# Patient Record
Sex: FEMALE | Race: WHITE | Employment: OTHER | URBAN - METROPOLITAN AREA
[De-identification: names, ages, dates, MRNs, and addresses within clinical notes are randomized per-mention and may not be internally consistent; named-entity substitution may affect disease eponyms.]

---

## 2017-05-19 ENCOUNTER — OFFICE VISIT (OUTPATIENT)
Dept: INTERNAL MEDICINE CLINIC | Age: 72
End: 2017-05-19

## 2017-05-19 VITALS
HEIGHT: 60 IN | OXYGEN SATURATION: 97 % | HEART RATE: 85 BPM | SYSTOLIC BLOOD PRESSURE: 100 MMHG | WEIGHT: 148.4 LBS | DIASTOLIC BLOOD PRESSURE: 60 MMHG | BODY MASS INDEX: 29.13 KG/M2

## 2017-05-19 DIAGNOSIS — M54.16 LEFT LUMBAR RADICULOPATHY: ICD-10-CM

## 2017-05-19 DIAGNOSIS — M81.0 OSTEOPOROSIS: ICD-10-CM

## 2017-05-19 DIAGNOSIS — M25.559 ARTHRALGIA OF HIP, UNSPECIFIED LATERALITY: ICD-10-CM

## 2017-05-19 DIAGNOSIS — F41.9 ANXIETY: ICD-10-CM
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
## 2017-05-19 DIAGNOSIS — E78.2 MIXED HYPERLIPIDEMIA: ICD-10-CM

## 2017-05-19 DIAGNOSIS — Z00.00 ROUTINE GENERAL MEDICAL EXAMINATION AT A HEALTH CARE FACILITY: Primary | ICD-10-CM

## 2017-05-19 DIAGNOSIS — I10 ESSENTIAL HYPERTENSION: ICD-10-CM

## 2017-05-19 DIAGNOSIS — M48.061 SPINAL STENOSIS OF LUMBAR REGION: ICD-10-CM

## 2017-05-19 DIAGNOSIS — Z12.31 VISIT FOR SCREENING MAMMOGRAM: ICD-10-CM

## 2017-05-19 PROCEDURE — G0439 PPPS, SUBSEQ VISIT: HCPCS | Performed by: INTERNAL MEDICINE

## 2017-05-19 RX ORDER — HYDROCODONE BITARTRATE AND ACETAMINOPHEN 5; 325 MG/1; MG/1
1 TABLET ORAL EVERY 8 HOURS PRN
Qty: 30 TABLET | Refills: 0 | Status: SHIPPED | OUTPATIENT
Start: 2017-05-19 | End: 2017-11-07 | Stop reason: SDUPTHER

## 2017-05-19 RX ORDER — ESCITALOPRAM OXALATE 10 MG/1
10 TABLET ORAL DAILY
Qty: 90 TABLET | Refills: 2 | Status: SHIPPED | OUTPATIENT
Start: 2017-05-19 | End: 2017-07-13 | Stop reason: SDUPTHER

## 2017-05-19 RX ORDER — HYDROCHLOROTHIAZIDE 12.5 MG/1
12.5 CAPSULE, GELATIN COATED ORAL DAILY PRN
Qty: 90 CAPSULE | Refills: 2 | Status: SHIPPED | OUTPATIENT
Start: 2017-05-19 | End: 2017-11-07 | Stop reason: SDUPTHER

## 2017-05-19 ASSESSMENT — ANXIETY QUESTIONNAIRES: GAD7 TOTAL SCORE: 0

## 2017-05-19 ASSESSMENT — LIFESTYLE VARIABLES: HOW OFTEN DO YOU HAVE A DRINK CONTAINING ALCOHOL: 0

## 2017-05-19 ASSESSMENT — PATIENT HEALTH QUESTIONNAIRE - PHQ9: SUM OF ALL RESPONSES TO PHQ QUESTIONS 1-9: 0

## 2017-05-30 DIAGNOSIS — I10 ESSENTIAL HYPERTENSION: ICD-10-CM

## 2017-05-30 DIAGNOSIS — E78.2 MIXED HYPERLIPIDEMIA: ICD-10-CM

## 2017-05-30 LAB
A/G RATIO: 1.5 (ref 1.1–2.2)
ALBUMIN SERPL-MCNC: 4 G/DL (ref 3.4–5)
ALP BLD-CCNC: 95 U/L (ref 40–129)
ALT SERPL-CCNC: 11 U/L (ref 10–40)
ANION GAP SERPL CALCULATED.3IONS-SCNC: 13 MMOL/L (ref 3–16)
AST SERPL-CCNC: 15 U/L (ref 15–37)
BASOPHILS ABSOLUTE: 0 K/UL (ref 0–0.2)
BASOPHILS RELATIVE PERCENT: 0.6 %
BILIRUB SERPL-MCNC: 0.4 MG/DL (ref 0–1)
BUN BLDV-MCNC: 28 MG/DL (ref 7–20)
CALCIUM SERPL-MCNC: 8.8 MG/DL (ref 8.3–10.6)
CHLORIDE BLD-SCNC: 106 MMOL/L (ref 99–110)
CHOLESTEROL, TOTAL: 192 MG/DL (ref 0–199)
CO2: 25 MMOL/L (ref 21–32)
CREAT SERPL-MCNC: 1.1 MG/DL (ref 0.6–1.2)
EOSINOPHILS ABSOLUTE: 0.2 K/UL (ref 0–0.6)
EOSINOPHILS RELATIVE PERCENT: 3.1 %
GFR AFRICAN AMERICAN: 59
GFR NON-AFRICAN AMERICAN: 49
GLOBULIN: 2.6 G/DL
GLUCOSE BLD-MCNC: 87 MG/DL (ref 70–99)
HCT VFR BLD CALC: 36.5 % (ref 36–48)
HDLC SERPL-MCNC: 60 MG/DL (ref 40–60)
HEMOGLOBIN: 12.2 G/DL (ref 12–16)
LDL CHOLESTEROL CALCULATED: 108 MG/DL
LYMPHOCYTES ABSOLUTE: 1 K/UL (ref 1–5.1)
LYMPHOCYTES RELATIVE PERCENT: 18.7 %
MCH RBC QN AUTO: 31.1 PG (ref 26–34)
MCHC RBC AUTO-ENTMCNC: 33.4 G/DL (ref 31–36)
MCV RBC AUTO: 93.2 FL (ref 80–100)
MONOCYTES ABSOLUTE: 0.6 K/UL (ref 0–1.3)
MONOCYTES RELATIVE PERCENT: 10.6 %
NEUTROPHILS ABSOLUTE: 3.5 K/UL (ref 1.7–7.7)
NEUTROPHILS RELATIVE PERCENT: 67 %
PDW BLD-RTO: 13.2 % (ref 12.4–15.4)
PLATELET # BLD: 186 K/UL (ref 135–450)
PMV BLD AUTO: 9.6 FL (ref 5–10.5)
POTASSIUM SERPL-SCNC: 4.5 MMOL/L (ref 3.5–5.1)
RBC # BLD: 3.92 M/UL (ref 4–5.2)
SODIUM BLD-SCNC: 144 MMOL/L (ref 136–145)
TOTAL PROTEIN: 6.6 G/DL (ref 6.4–8.2)
TRIGL SERPL-MCNC: 118 MG/DL (ref 0–150)
VLDLC SERPL CALC-MCNC: 24 MG/DL
WBC # BLD: 5.3 K/UL (ref 4–11)

## 2017-05-31 LAB — TSH SERPL DL<=0.05 MIU/L-ACNC: 1.77 UIU/ML (ref 0.27–4.2)

## 2017-06-02 DIAGNOSIS — M81.0 OSTEOPOROSIS: Primary | ICD-10-CM

## 2017-06-02 RX ORDER — ALENDRONATE SODIUM 70 MG/1
70 TABLET ORAL
Qty: 4 TABLET | Refills: 3 | Status: SHIPPED | OUTPATIENT
Start: 2017-06-02 | End: 2017-11-07 | Stop reason: ALTCHOICE

## 2017-07-13 DIAGNOSIS — F41.9 ANXIETY: ICD-10-CM

## 2017-07-13 RX ORDER — ESCITALOPRAM OXALATE 10 MG/1
10 TABLET ORAL DAILY
Qty: 90 TABLET | Refills: 2 | Status: SHIPPED | OUTPATIENT
Start: 2017-07-13 | End: 2017-11-07 | Stop reason: SDUPTHER

## 2017-11-07 ENCOUNTER — OFFICE VISIT (OUTPATIENT)
Dept: INTERNAL MEDICINE CLINIC | Age: 72
End: 2017-11-07

## 2017-11-07 VITALS
DIASTOLIC BLOOD PRESSURE: 66 MMHG | WEIGHT: 146 LBS | RESPIRATION RATE: 16 BRPM | HEART RATE: 88 BPM | BODY MASS INDEX: 28.51 KG/M2 | OXYGEN SATURATION: 98 % | SYSTOLIC BLOOD PRESSURE: 120 MMHG

## 2017-11-07 DIAGNOSIS — M54.16 LEFT LUMBAR RADICULOPATHY: ICD-10-CM

## 2017-11-07 DIAGNOSIS — M54.31 RIGHT SIDED SCIATICA: Primary | ICD-10-CM

## 2017-11-07 DIAGNOSIS — Z23 NEED FOR IMMUNIZATION AGAINST INFLUENZA: ICD-10-CM

## 2017-11-07 DIAGNOSIS — K21.9 GASTROESOPHAGEAL REFLUX DISEASE WITHOUT ESOPHAGITIS: ICD-10-CM

## 2017-11-07 DIAGNOSIS — I10 ESSENTIAL HYPERTENSION: ICD-10-CM

## 2017-11-07 DIAGNOSIS — F41.9 ANXIETY: ICD-10-CM

## 2017-11-07 PROCEDURE — G8417 CALC BMI ABV UP PARAM F/U: HCPCS | Performed by: INTERNAL MEDICINE

## 2017-11-07 PROCEDURE — 4040F PNEUMOC VAC/ADMIN/RCVD: CPT | Performed by: INTERNAL MEDICINE

## 2017-11-07 PROCEDURE — G8484 FLU IMMUNIZE NO ADMIN: HCPCS | Performed by: INTERNAL MEDICINE

## 2017-11-07 PROCEDURE — G0008 ADMIN INFLUENZA VIRUS VAC: HCPCS | Performed by: INTERNAL MEDICINE

## 2017-11-07 PROCEDURE — G8399 PT W/DXA RESULTS DOCUMENT: HCPCS | Performed by: INTERNAL MEDICINE

## 2017-11-07 PROCEDURE — G8427 DOCREV CUR MEDS BY ELIG CLIN: HCPCS | Performed by: INTERNAL MEDICINE

## 2017-11-07 PROCEDURE — 1090F PRES/ABSN URINE INCON ASSESS: CPT | Performed by: INTERNAL MEDICINE

## 2017-11-07 PROCEDURE — 99214 OFFICE O/P EST MOD 30 MIN: CPT | Performed by: INTERNAL MEDICINE

## 2017-11-07 PROCEDURE — 1036F TOBACCO NON-USER: CPT | Performed by: INTERNAL MEDICINE

## 2017-11-07 PROCEDURE — 1123F ACP DISCUSS/DSCN MKR DOCD: CPT | Performed by: INTERNAL MEDICINE

## 2017-11-07 PROCEDURE — 3017F COLORECTAL CA SCREEN DOC REV: CPT | Performed by: INTERNAL MEDICINE

## 2017-11-07 PROCEDURE — 90662 IIV NO PRSV INCREASED AG IM: CPT | Performed by: INTERNAL MEDICINE

## 2017-11-07 PROCEDURE — 3014F SCREEN MAMMO DOC REV: CPT | Performed by: INTERNAL MEDICINE

## 2017-11-07 RX ORDER — ESCITALOPRAM OXALATE 10 MG/1
10 TABLET ORAL DAILY
Qty: 90 TABLET | Refills: 2 | Status: SHIPPED | OUTPATIENT
Start: 2017-11-07 | End: 2018-04-18 | Stop reason: SDUPTHER

## 2017-11-07 RX ORDER — HYDROCODONE BITARTRATE AND ACETAMINOPHEN 5; 325 MG/1; MG/1
1 TABLET ORAL EVERY 8 HOURS PRN
Qty: 30 TABLET | Refills: 0 | Status: SHIPPED | OUTPATIENT
Start: 2017-11-07 | End: 2018-04-18 | Stop reason: ALTCHOICE

## 2017-11-07 RX ORDER — CELECOXIB 200 MG/1
200 CAPSULE ORAL DAILY
Qty: 90 CAPSULE | Refills: 1 | Status: SHIPPED | OUTPATIENT
Start: 2017-11-07 | End: 2018-04-18 | Stop reason: SDUPTHER

## 2017-11-07 RX ORDER — PREDNISONE 1 MG/1
TABLET ORAL
Qty: 36 TABLET | Refills: 0 | Status: SHIPPED | OUTPATIENT
Start: 2017-11-07 | End: 2018-04-18 | Stop reason: ALTCHOICE

## 2017-11-07 RX ORDER — HYDROCHLOROTHIAZIDE 12.5 MG/1
12.5 CAPSULE, GELATIN COATED ORAL DAILY PRN
Qty: 90 CAPSULE | Refills: 2 | Status: SHIPPED | OUTPATIENT
Start: 2017-11-07 | End: 2018-04-18 | Stop reason: SDUPTHER

## 2017-11-07 NOTE — PATIENT INSTRUCTIONS
Up Now\" link. Current as of: March 21, 2017  Content Version: 11.3  © 4229-9754 [employer]. Care instructions adapted under license by [facility] ([facility]). If you have questions about a medical condition or this instruction, always ask your healthcare professional. [address] any warranty or liability for your use of this information.

## 2017-11-07 NOTE — PROGRESS NOTES
and all orders for this visit:    Right sided sciatica- SHE WILL TRY HOME EXERCISES, PRED TAPER. IF NO BETTER IN 1-2 WEEKS WILL CONSIDER RETRY PT  -     predniSONE (DELTASONE) 5 MG tablet; Take 8 pills, then 7,6,5,4,3,2,1    Left lumbar radiculopathy- OTHERWISE STABLE AND CONTROLLED W LIMITED SCRIPT PRN NORCO. RF TODAY, ALSO RETRY CELEBREX PER PT REQUEST  -     HYDROcodone-acetaminophen (NORCO) 5-325 MG per tablet; Take 1 tablet by mouth every 8 hours as needed for Pain . Earliest Fill Date: 11/7/17  -     celecoxib (CELEBREX) 200 MG capsule; Take 1 capsule by mouth daily    Essential hypertension - Blood pressure stable and will continue current regimen. Plan FOR FASTING LAB AT NEXT APPT    -     hydrochlorothiazide (MICROZIDE) 12.5 MG capsule; Take 1 capsule by mouth daily as needed (for swelling in legs or high BP)    Anxiety - Mood stable on current regimen w/o any significant manifestations of severe depression or anxiety noted at this time. Cont current meds. -     escitalopram (LEXAPRO) 10 MG tablet; Take 1 tablet by mouth daily    Need for immunization against influenza  -     INFLUENZA, HIGH DOSE, 65 YRS +, IM, PF, PREFILL SYR, 0.5ML (FLUZONE HD)    Gastroesophageal reflux disease without esophagitis- MILD FLARE BUT ONLY ON 23MG DOSE OTC, ADVISED TO INCR TO 2 TABS/DAY FOR A WEEK THEN MAY GO BACK TO ONE/DAY, BID PRN  -     esomeprazole (NEXIUM) 20 MG delayed release capsule;  Take 2 capsules by mouth every morning (before breakfast)

## 2018-04-18 ENCOUNTER — OFFICE VISIT (OUTPATIENT)
Dept: INTERNAL MEDICINE CLINIC | Age: 73
End: 2018-04-18

## 2018-04-18 VITALS
DIASTOLIC BLOOD PRESSURE: 72 MMHG | OXYGEN SATURATION: 97 % | SYSTOLIC BLOOD PRESSURE: 112 MMHG | BODY MASS INDEX: 29.06 KG/M2 | HEART RATE: 83 BPM | WEIGHT: 148 LBS | RESPIRATION RATE: 16 BRPM | HEIGHT: 60 IN

## 2018-04-18 DIAGNOSIS — M54.16 LEFT LUMBAR RADICULOPATHY: ICD-10-CM

## 2018-04-18 DIAGNOSIS — F41.9 ANXIETY: ICD-10-CM

## 2018-04-18 DIAGNOSIS — E78.2 MIXED HYPERLIPIDEMIA: ICD-10-CM

## 2018-04-18 DIAGNOSIS — Z12.12 SCREENING FOR COLORECTAL CANCER: ICD-10-CM

## 2018-04-18 DIAGNOSIS — I10 ESSENTIAL HYPERTENSION: Primary | ICD-10-CM

## 2018-04-18 DIAGNOSIS — K21.9 GASTROESOPHAGEAL REFLUX DISEASE WITHOUT ESOPHAGITIS: ICD-10-CM

## 2018-04-18 DIAGNOSIS — Z12.31 VISIT FOR SCREENING MAMMOGRAM: ICD-10-CM

## 2018-04-18 DIAGNOSIS — I10 ESSENTIAL HYPERTENSION: ICD-10-CM

## 2018-04-18 DIAGNOSIS — Z23 NEED FOR SHINGLES VACCINE: ICD-10-CM

## 2018-04-18 DIAGNOSIS — Z12.11 SCREENING FOR COLORECTAL CANCER: ICD-10-CM

## 2018-04-18 LAB
A/G RATIO: 1.9 (ref 1.1–2.2)
ALBUMIN SERPL-MCNC: 4.4 G/DL (ref 3.4–5)
ALP BLD-CCNC: 83 U/L (ref 40–129)
ALT SERPL-CCNC: 16 U/L (ref 10–40)
ANION GAP SERPL CALCULATED.3IONS-SCNC: 14 MMOL/L (ref 3–16)
AST SERPL-CCNC: 19 U/L (ref 15–37)
BASOPHILS ABSOLUTE: 0.1 K/UL (ref 0–0.2)
BASOPHILS RELATIVE PERCENT: 1 %
BILIRUB SERPL-MCNC: 0.6 MG/DL (ref 0–1)
BUN BLDV-MCNC: 20 MG/DL (ref 7–20)
CALCIUM SERPL-MCNC: 9.4 MG/DL (ref 8.3–10.6)
CHLORIDE BLD-SCNC: 103 MMOL/L (ref 99–110)
CHOLESTEROL, TOTAL: 207 MG/DL (ref 0–199)
CO2: 25 MMOL/L (ref 21–32)
CREAT SERPL-MCNC: 1 MG/DL (ref 0.6–1.2)
EOSINOPHILS ABSOLUTE: 0.2 K/UL (ref 0–0.6)
EOSINOPHILS RELATIVE PERCENT: 4.1 %
GFR AFRICAN AMERICAN: >60
GFR NON-AFRICAN AMERICAN: 54
GLOBULIN: 2.3 G/DL
GLUCOSE BLD-MCNC: 92 MG/DL (ref 70–99)
HCT VFR BLD CALC: 36.9 % (ref 36–48)
HDLC SERPL-MCNC: 64 MG/DL (ref 40–60)
HEMOGLOBIN: 13.1 G/DL (ref 12–16)
LDL CHOLESTEROL CALCULATED: 119 MG/DL
LYMPHOCYTES ABSOLUTE: 0.9 K/UL (ref 1–5.1)
LYMPHOCYTES RELATIVE PERCENT: 17 %
MCH RBC QN AUTO: 32.1 PG (ref 26–34)
MCHC RBC AUTO-ENTMCNC: 35.5 G/DL (ref 31–36)
MCV RBC AUTO: 90.6 FL (ref 80–100)
MONOCYTES ABSOLUTE: 0.6 K/UL (ref 0–1.3)
MONOCYTES RELATIVE PERCENT: 11 %
NEUTROPHILS ABSOLUTE: 3.6 K/UL (ref 1.7–7.7)
NEUTROPHILS RELATIVE PERCENT: 66.9 %
PDW BLD-RTO: 13.4 % (ref 12.4–15.4)
PLATELET # BLD: 209 K/UL (ref 135–450)
PMV BLD AUTO: 9.5 FL (ref 5–10.5)
POTASSIUM SERPL-SCNC: 4.7 MMOL/L (ref 3.5–5.1)
RBC # BLD: 4.07 M/UL (ref 4–5.2)
SODIUM BLD-SCNC: 142 MMOL/L (ref 136–145)
TOTAL PROTEIN: 6.7 G/DL (ref 6.4–8.2)
TRIGL SERPL-MCNC: 118 MG/DL (ref 0–150)
TSH REFLEX: 1.91 UIU/ML (ref 0.27–4.2)
VLDLC SERPL CALC-MCNC: 24 MG/DL
WBC # BLD: 5.4 K/UL (ref 4–11)

## 2018-04-18 PROCEDURE — 1090F PRES/ABSN URINE INCON ASSESS: CPT | Performed by: INTERNAL MEDICINE

## 2018-04-18 PROCEDURE — 3014F SCREEN MAMMO DOC REV: CPT | Performed by: INTERNAL MEDICINE

## 2018-04-18 PROCEDURE — 4040F PNEUMOC VAC/ADMIN/RCVD: CPT | Performed by: INTERNAL MEDICINE

## 2018-04-18 PROCEDURE — G8510 SCR DEP NEG, NO PLAN REQD: HCPCS | Performed by: INTERNAL MEDICINE

## 2018-04-18 PROCEDURE — G8417 CALC BMI ABV UP PARAM F/U: HCPCS | Performed by: INTERNAL MEDICINE

## 2018-04-18 PROCEDURE — 1036F TOBACCO NON-USER: CPT | Performed by: INTERNAL MEDICINE

## 2018-04-18 PROCEDURE — 1123F ACP DISCUSS/DSCN MKR DOCD: CPT | Performed by: INTERNAL MEDICINE

## 2018-04-18 PROCEDURE — G8399 PT W/DXA RESULTS DOCUMENT: HCPCS | Performed by: INTERNAL MEDICINE

## 2018-04-18 PROCEDURE — 3288F FALL RISK ASSESSMENT DOCD: CPT | Performed by: INTERNAL MEDICINE

## 2018-04-18 PROCEDURE — 3017F COLORECTAL CA SCREEN DOC REV: CPT | Performed by: INTERNAL MEDICINE

## 2018-04-18 PROCEDURE — 99214 OFFICE O/P EST MOD 30 MIN: CPT | Performed by: INTERNAL MEDICINE

## 2018-04-18 PROCEDURE — G8427 DOCREV CUR MEDS BY ELIG CLIN: HCPCS | Performed by: INTERNAL MEDICINE

## 2018-04-18 RX ORDER — ESCITALOPRAM OXALATE 10 MG/1
10 TABLET ORAL DAILY
Qty: 90 TABLET | Refills: 1 | Status: SHIPPED | OUTPATIENT
Start: 2018-04-18 | End: 2018-09-11 | Stop reason: SDUPTHER

## 2018-04-18 RX ORDER — CELECOXIB 200 MG/1
200 CAPSULE ORAL DAILY
Qty: 90 CAPSULE | Refills: 1 | Status: SHIPPED | OUTPATIENT
Start: 2018-04-18 | End: 2018-11-30 | Stop reason: SDUPTHER

## 2018-04-18 RX ORDER — HYDROCHLOROTHIAZIDE 12.5 MG/1
12.5 CAPSULE, GELATIN COATED ORAL DAILY PRN
Qty: 90 CAPSULE | Refills: 1 | Status: SHIPPED | OUTPATIENT
Start: 2018-04-18 | End: 2018-11-30 | Stop reason: SDUPTHER

## 2018-04-18 ASSESSMENT — LIFESTYLE VARIABLES
HOW OFTEN DO YOU HAVE A DRINK CONTAINING ALCOHOL: 2
AUDIT-C TOTAL SCORE: 2
HAS A RELATIVE, FRIEND, DOCTOR, OR ANOTHER HEALTH PROFESSIONAL EXPRESSED CONCERN ABOUT YOUR DRINKING OR SUGGESTED YOU CUT DOWN: 0
HOW OFTEN DURING THE LAST YEAR HAVE YOU HAD A FEELING OF GUILT OR REMORSE AFTER DRINKING: 0
HOW OFTEN DURING THE LAST YEAR HAVE YOU FOUND THAT YOU WERE NOT ABLE TO STOP DRINKING ONCE YOU HAD STARTED: 0
HOW OFTEN DURING THE LAST YEAR HAVE YOU FAILED TO DO WHAT WAS NORMALLY EXPECTED FROM YOU BECAUSE OF DRINKING: 0
HAVE YOU OR SOMEONE ELSE BEEN INJURED AS A RESULT OF YOUR DRINKING: 0
HOW OFTEN DO YOU HAVE SIX OR MORE DRINKS ON ONE OCCASION: 0
AUDIT TOTAL SCORE: 2
HOW OFTEN DURING THE LAST YEAR HAVE YOU BEEN UNABLE TO REMEMBER WHAT HAPPENED THE NIGHT BEFORE BECAUSE YOU HAD BEEN DRINKING: 0
HOW MANY STANDARD DRINKS CONTAINING ALCOHOL DO YOU HAVE ON A TYPICAL DAY: 0
HOW OFTEN DURING THE LAST YEAR HAVE YOU NEEDED AN ALCOHOLIC DRINK FIRST THING IN THE MORNING TO GET YOURSELF GOING AFTER A NIGHT OF HEAVY DRINKING: 0

## 2018-04-18 ASSESSMENT — PATIENT HEALTH QUESTIONNAIRE - PHQ9: SUM OF ALL RESPONSES TO PHQ QUESTIONS 1-9: 0

## 2018-04-18 ASSESSMENT — ANXIETY QUESTIONNAIRES: GAD7 TOTAL SCORE: 1

## 2018-05-07 ENCOUNTER — OFFICE VISIT (OUTPATIENT)
Dept: INTERNAL MEDICINE CLINIC | Age: 73
End: 2018-05-07

## 2018-05-07 VITALS
RESPIRATION RATE: 16 BRPM | DIASTOLIC BLOOD PRESSURE: 76 MMHG | TEMPERATURE: 99.1 F | OXYGEN SATURATION: 98 % | HEART RATE: 100 BPM | SYSTOLIC BLOOD PRESSURE: 138 MMHG

## 2018-05-07 DIAGNOSIS — G47.33 OSA (OBSTRUCTIVE SLEEP APNEA): ICD-10-CM

## 2018-05-07 DIAGNOSIS — J01.00 ACUTE NON-RECURRENT MAXILLARY SINUSITIS: Primary | ICD-10-CM

## 2018-05-07 PROCEDURE — 3017F COLORECTAL CA SCREEN DOC REV: CPT | Performed by: INTERNAL MEDICINE

## 2018-05-07 PROCEDURE — 1090F PRES/ABSN URINE INCON ASSESS: CPT | Performed by: INTERNAL MEDICINE

## 2018-05-07 PROCEDURE — 1123F ACP DISCUSS/DSCN MKR DOCD: CPT | Performed by: INTERNAL MEDICINE

## 2018-05-07 PROCEDURE — G8427 DOCREV CUR MEDS BY ELIG CLIN: HCPCS | Performed by: INTERNAL MEDICINE

## 2018-05-07 PROCEDURE — 1036F TOBACCO NON-USER: CPT | Performed by: INTERNAL MEDICINE

## 2018-05-07 PROCEDURE — G8399 PT W/DXA RESULTS DOCUMENT: HCPCS | Performed by: INTERNAL MEDICINE

## 2018-05-07 PROCEDURE — 99213 OFFICE O/P EST LOW 20 MIN: CPT | Performed by: INTERNAL MEDICINE

## 2018-05-07 PROCEDURE — 3014F SCREEN MAMMO DOC REV: CPT | Performed by: INTERNAL MEDICINE

## 2018-05-07 PROCEDURE — 4040F PNEUMOC VAC/ADMIN/RCVD: CPT | Performed by: INTERNAL MEDICINE

## 2018-05-07 PROCEDURE — G8417 CALC BMI ABV UP PARAM F/U: HCPCS | Performed by: INTERNAL MEDICINE

## 2018-05-07 RX ORDER — AZITHROMYCIN 250 MG/1
TABLET, FILM COATED ORAL
Qty: 6 TABLET | Refills: 0 | Status: SHIPPED | OUTPATIENT
Start: 2018-05-07 | End: 2018-05-14 | Stop reason: ALTCHOICE

## 2018-05-07 RX ORDER — PREDNISONE 1 MG/1
TABLET ORAL
Qty: 21 TABLET | Refills: 0 | Status: SHIPPED | OUTPATIENT
Start: 2018-05-07 | End: 2018-05-14 | Stop reason: ALTCHOICE

## 2018-05-07 RX ORDER — CODEINE PHOSPHATE AND GUAIFENESIN 10; 100 MG/5ML; MG/5ML
5 SOLUTION ORAL 3 TIMES DAILY PRN
Qty: 150 ML | Refills: 0 | Status: SHIPPED | OUTPATIENT
Start: 2018-05-07 | End: 2018-05-14

## 2018-05-14 ENCOUNTER — OFFICE VISIT (OUTPATIENT)
Dept: INTERNAL MEDICINE CLINIC | Age: 73
End: 2018-05-14

## 2018-05-14 VITALS
HEART RATE: 88 BPM | DIASTOLIC BLOOD PRESSURE: 68 MMHG | SYSTOLIC BLOOD PRESSURE: 132 MMHG | OXYGEN SATURATION: 97 % | RESPIRATION RATE: 18 BRPM

## 2018-05-14 DIAGNOSIS — J18.9 PNEUMONIA DUE TO INFECTIOUS ORGANISM, UNSPECIFIED LATERALITY, UNSPECIFIED PART OF LUNG: Primary | ICD-10-CM

## 2018-05-14 DIAGNOSIS — J18.9 PNEUMONIA DUE TO INFECTIOUS ORGANISM, UNSPECIFIED LATERALITY, UNSPECIFIED PART OF LUNG: ICD-10-CM

## 2018-05-14 PROCEDURE — 99213 OFFICE O/P EST LOW 20 MIN: CPT | Performed by: INTERNAL MEDICINE

## 2018-05-14 PROCEDURE — 1090F PRES/ABSN URINE INCON ASSESS: CPT | Performed by: INTERNAL MEDICINE

## 2018-05-14 PROCEDURE — 3014F SCREEN MAMMO DOC REV: CPT | Performed by: INTERNAL MEDICINE

## 2018-05-14 PROCEDURE — 1123F ACP DISCUSS/DSCN MKR DOCD: CPT | Performed by: INTERNAL MEDICINE

## 2018-05-14 PROCEDURE — 1036F TOBACCO NON-USER: CPT | Performed by: INTERNAL MEDICINE

## 2018-05-14 PROCEDURE — G8427 DOCREV CUR MEDS BY ELIG CLIN: HCPCS | Performed by: INTERNAL MEDICINE

## 2018-05-14 PROCEDURE — 3017F COLORECTAL CA SCREEN DOC REV: CPT | Performed by: INTERNAL MEDICINE

## 2018-05-14 PROCEDURE — G8417 CALC BMI ABV UP PARAM F/U: HCPCS | Performed by: INTERNAL MEDICINE

## 2018-05-14 PROCEDURE — 4040F PNEUMOC VAC/ADMIN/RCVD: CPT | Performed by: INTERNAL MEDICINE

## 2018-05-14 PROCEDURE — G8399 PT W/DXA RESULTS DOCUMENT: HCPCS | Performed by: INTERNAL MEDICINE

## 2018-05-14 RX ORDER — LEVOFLOXACIN 500 MG/1
500 TABLET, FILM COATED ORAL DAILY
Qty: 14 TABLET | Refills: 0 | Status: SHIPPED | OUTPATIENT
Start: 2018-05-14 | End: 2018-05-28

## 2018-05-14 RX ORDER — PREDNISONE 1 MG/1
TABLET ORAL
Qty: 36 TABLET | Refills: 0 | Status: SHIPPED | OUTPATIENT
Start: 2018-05-14 | End: 2018-06-07

## 2018-06-11 ENCOUNTER — HOSPITAL ENCOUNTER (OUTPATIENT)
Dept: SURGERY | Age: 73
Discharge: HOME | End: 2018-06-11
Attending: INTERNAL MEDICINE | Admitting: INTERNAL MEDICINE

## 2018-06-11 VITALS
WEIGHT: 146 LBS | RESPIRATION RATE: 16 BRPM | HEIGHT: 60 IN | HEART RATE: 72 BPM | SYSTOLIC BLOOD PRESSURE: 117 MMHG | TEMPERATURE: 97.4 F | BODY MASS INDEX: 28.66 KG/M2 | OXYGEN SATURATION: 97 % | DIASTOLIC BLOOD PRESSURE: 75 MMHG

## 2018-06-11 RX ORDER — SODIUM CHLORIDE, SODIUM LACTATE, POTASSIUM CHLORIDE, CALCIUM CHLORIDE 600; 310; 30; 20 MG/100ML; MG/100ML; MG/100ML; MG/100ML
INJECTION, SOLUTION INTRAVENOUS CONTINUOUS
Status: DISCONTINUED | OUTPATIENT
Start: 2018-06-11 | End: 2018-06-12 | Stop reason: HOSPADM

## 2018-06-11 RX ADMIN — SODIUM CHLORIDE, SODIUM LACTATE, POTASSIUM CHLORIDE, CALCIUM CHLORIDE: 600; 310; 30; 20 INJECTION, SOLUTION INTRAVENOUS at 07:35

## 2018-06-11 ASSESSMENT — PAIN SCALES - GENERAL
PAINLEVEL_OUTOF10: 0
PAINLEVEL_OUTOF10: 0

## 2018-06-11 ASSESSMENT — PAIN - FUNCTIONAL ASSESSMENT: PAIN_FUNCTIONAL_ASSESSMENT: 0-10

## 2018-09-11 DIAGNOSIS — F41.9 ANXIETY: ICD-10-CM

## 2018-09-11 RX ORDER — ESCITALOPRAM OXALATE 10 MG/1
10 TABLET ORAL EVERY MORNING
Qty: 30 TABLET | Refills: 0 | Status: SHIPPED | OUTPATIENT
Start: 2018-09-11 | End: 2018-11-30 | Stop reason: SDUPTHER

## 2018-10-22 ENCOUNTER — NURSE ONLY (OUTPATIENT)
Dept: INTERNAL MEDICINE CLINIC | Age: 73
End: 2018-10-22
Payer: MEDICARE

## 2018-10-22 DIAGNOSIS — Z23 NEED FOR INFLUENZA VACCINATION: Primary | ICD-10-CM

## 2018-10-22 PROCEDURE — G0008 ADMIN INFLUENZA VIRUS VAC: HCPCS | Performed by: INTERNAL MEDICINE

## 2018-10-22 PROCEDURE — 90662 IIV NO PRSV INCREASED AG IM: CPT | Performed by: INTERNAL MEDICINE

## 2018-11-30 ENCOUNTER — OFFICE VISIT (OUTPATIENT)
Dept: INTERNAL MEDICINE CLINIC | Age: 73
End: 2018-11-30
Payer: MEDICARE

## 2018-11-30 VITALS
HEART RATE: 72 BPM | RESPIRATION RATE: 16 BRPM | WEIGHT: 145 LBS | DIASTOLIC BLOOD PRESSURE: 70 MMHG | SYSTOLIC BLOOD PRESSURE: 124 MMHG | OXYGEN SATURATION: 98 % | BODY MASS INDEX: 28.32 KG/M2

## 2018-11-30 DIAGNOSIS — I10 ESSENTIAL HYPERTENSION: ICD-10-CM

## 2018-11-30 DIAGNOSIS — F41.9 ANXIETY: ICD-10-CM

## 2018-11-30 DIAGNOSIS — K21.9 GASTROESOPHAGEAL REFLUX DISEASE WITHOUT ESOPHAGITIS: ICD-10-CM

## 2018-11-30 DIAGNOSIS — R61 NIGHT SWEATS: ICD-10-CM

## 2018-11-30 DIAGNOSIS — E78.2 MIXED HYPERLIPIDEMIA: ICD-10-CM

## 2018-11-30 DIAGNOSIS — R13.10 DYSPHAGIA, UNSPECIFIED TYPE: Primary | ICD-10-CM

## 2018-11-30 DIAGNOSIS — M54.16 LEFT LUMBAR RADICULOPATHY: ICD-10-CM

## 2018-11-30 LAB
A/G RATIO: 1.9 (ref 1.1–2.2)
ALBUMIN SERPL-MCNC: 4.1 G/DL (ref 3.4–5)
ALP BLD-CCNC: 92 U/L (ref 40–129)
ALT SERPL-CCNC: 36 U/L (ref 10–40)
ANION GAP SERPL CALCULATED.3IONS-SCNC: 12 MMOL/L (ref 3–16)
AST SERPL-CCNC: 41 U/L (ref 15–37)
BASOPHILS ABSOLUTE: 0 K/UL (ref 0–0.2)
BASOPHILS RELATIVE PERCENT: 1 %
BILIRUB SERPL-MCNC: 0.5 MG/DL (ref 0–1)
BUN BLDV-MCNC: 27 MG/DL (ref 7–20)
CALCIUM SERPL-MCNC: 8.9 MG/DL (ref 8.3–10.6)
CHLORIDE BLD-SCNC: 107 MMOL/L (ref 99–110)
CHOLESTEROL, TOTAL: 206 MG/DL (ref 0–199)
CO2: 24 MMOL/L (ref 21–32)
CREAT SERPL-MCNC: 1.1 MG/DL (ref 0.6–1.2)
EOSINOPHILS ABSOLUTE: 0.2 K/UL (ref 0–0.6)
EOSINOPHILS RELATIVE PERCENT: 3.6 %
GFR AFRICAN AMERICAN: 59
GFR NON-AFRICAN AMERICAN: 48
GLOBULIN: 2.2 G/DL
GLUCOSE BLD-MCNC: 97 MG/DL (ref 70–99)
HCT VFR BLD CALC: 36.4 % (ref 36–48)
HDLC SERPL-MCNC: 67 MG/DL (ref 40–60)
HEMOGLOBIN: 12.3 G/DL (ref 12–16)
LDL CHOLESTEROL CALCULATED: 124 MG/DL
LYMPHOCYTES ABSOLUTE: 0.8 K/UL (ref 1–5.1)
LYMPHOCYTES RELATIVE PERCENT: 16.5 %
MCH RBC QN AUTO: 31.5 PG (ref 26–34)
MCHC RBC AUTO-ENTMCNC: 33.8 G/DL (ref 31–36)
MCV RBC AUTO: 92.9 FL (ref 80–100)
MONOCYTES ABSOLUTE: 0.6 K/UL (ref 0–1.3)
MONOCYTES RELATIVE PERCENT: 13.3 %
NEUTROPHILS ABSOLUTE: 3.1 K/UL (ref 1.7–7.7)
NEUTROPHILS RELATIVE PERCENT: 65.6 %
PDW BLD-RTO: 13.8 % (ref 12.4–15.4)
PLATELET # BLD: 195 K/UL (ref 135–450)
PMV BLD AUTO: 9.3 FL (ref 5–10.5)
POTASSIUM SERPL-SCNC: 5 MMOL/L (ref 3.5–5.1)
RBC # BLD: 3.92 M/UL (ref 4–5.2)
SEDIMENTATION RATE, ERYTHROCYTE: 22 MM/HR (ref 0–30)
SODIUM BLD-SCNC: 143 MMOL/L (ref 136–145)
TOTAL PROTEIN: 6.3 G/DL (ref 6.4–8.2)
TRIGL SERPL-MCNC: 75 MG/DL (ref 0–150)
TSH REFLEX: 1.82 UIU/ML (ref 0.27–4.2)
VLDLC SERPL CALC-MCNC: 15 MG/DL
WBC # BLD: 4.8 K/UL (ref 4–11)

## 2018-11-30 PROCEDURE — 1101F PT FALLS ASSESS-DOCD LE1/YR: CPT | Performed by: INTERNAL MEDICINE

## 2018-11-30 PROCEDURE — G8417 CALC BMI ABV UP PARAM F/U: HCPCS | Performed by: INTERNAL MEDICINE

## 2018-11-30 PROCEDURE — 1036F TOBACCO NON-USER: CPT | Performed by: INTERNAL MEDICINE

## 2018-11-30 PROCEDURE — G8427 DOCREV CUR MEDS BY ELIG CLIN: HCPCS | Performed by: INTERNAL MEDICINE

## 2018-11-30 PROCEDURE — 3014F SCREEN MAMMO DOC REV: CPT | Performed by: INTERNAL MEDICINE

## 2018-11-30 PROCEDURE — G8399 PT W/DXA RESULTS DOCUMENT: HCPCS | Performed by: INTERNAL MEDICINE

## 2018-11-30 PROCEDURE — 99214 OFFICE O/P EST MOD 30 MIN: CPT | Performed by: INTERNAL MEDICINE

## 2018-11-30 PROCEDURE — 1090F PRES/ABSN URINE INCON ASSESS: CPT | Performed by: INTERNAL MEDICINE

## 2018-11-30 PROCEDURE — 1123F ACP DISCUSS/DSCN MKR DOCD: CPT | Performed by: INTERNAL MEDICINE

## 2018-11-30 PROCEDURE — 3017F COLORECTAL CA SCREEN DOC REV: CPT | Performed by: INTERNAL MEDICINE

## 2018-11-30 PROCEDURE — 4040F PNEUMOC VAC/ADMIN/RCVD: CPT | Performed by: INTERNAL MEDICINE

## 2018-11-30 PROCEDURE — G8482 FLU IMMUNIZE ORDER/ADMIN: HCPCS | Performed by: INTERNAL MEDICINE

## 2018-11-30 RX ORDER — ESCITALOPRAM OXALATE 10 MG/1
10 TABLET ORAL EVERY MORNING
Qty: 90 TABLET | Refills: 1 | Status: SHIPPED | OUTPATIENT
Start: 2018-11-30 | End: 2019-06-10 | Stop reason: SDUPTHER

## 2018-11-30 RX ORDER — CELECOXIB 200 MG/1
200 CAPSULE ORAL EVERY MORNING
Qty: 90 CAPSULE | Refills: 1 | Status: SHIPPED | OUTPATIENT
Start: 2018-11-30 | End: 2019-04-09 | Stop reason: SDUPTHER

## 2018-11-30 RX ORDER — HYDROCHLOROTHIAZIDE 12.5 MG/1
12.5 CAPSULE, GELATIN COATED ORAL EVERY MORNING
Qty: 90 CAPSULE | Refills: 1 | Status: SHIPPED | OUTPATIENT
Start: 2018-11-30 | End: 2019-12-27 | Stop reason: SDUPTHER

## 2018-12-04 ENCOUNTER — ANESTHESIA EVENT (OUTPATIENT)
Dept: OPERATING ROOM | Age: 73
End: 2018-12-04
Payer: MEDICARE

## 2018-12-05 ENCOUNTER — HOSPITAL ENCOUNTER (OUTPATIENT)
Age: 73
Setting detail: OUTPATIENT SURGERY
Discharge: HOME | End: 2018-12-05
Attending: INTERNAL MEDICINE | Admitting: INTERNAL MEDICINE
Payer: MEDICARE

## 2018-12-05 ENCOUNTER — ANESTHESIA (OUTPATIENT)
Dept: OPERATING ROOM | Age: 73
End: 2018-12-05
Payer: MEDICARE

## 2018-12-05 VITALS
BODY MASS INDEX: 28.66 KG/M2 | HEIGHT: 60 IN | SYSTOLIC BLOOD PRESSURE: 129 MMHG | DIASTOLIC BLOOD PRESSURE: 72 MMHG | OXYGEN SATURATION: 95 % | HEART RATE: 73 BPM | TEMPERATURE: 97.8 F | RESPIRATION RATE: 16 BRPM | WEIGHT: 146 LBS

## 2018-12-05 VITALS — OXYGEN SATURATION: 97 % | SYSTOLIC BLOOD PRESSURE: 122 MMHG | DIASTOLIC BLOOD PRESSURE: 64 MMHG

## 2018-12-05 PROCEDURE — 7100000010 HC PHASE II RECOVERY - FIRST 15 MIN: Performed by: INTERNAL MEDICINE

## 2018-12-05 PROCEDURE — 2580000003 HC RX 258: Performed by: INTERNAL MEDICINE

## 2018-12-05 PROCEDURE — 3700000000 HC ANESTHESIA ATTENDED CARE: Performed by: INTERNAL MEDICINE

## 2018-12-05 PROCEDURE — 3700000001 HC ADD 15 MINUTES (ANESTHESIA): Performed by: INTERNAL MEDICINE

## 2018-12-05 PROCEDURE — 2580000003 HC RX 258: Performed by: NURSE ANESTHETIST, CERTIFIED REGISTERED

## 2018-12-05 PROCEDURE — 2500000003 HC RX 250 WO HCPCS: Performed by: NURSE ANESTHETIST, CERTIFIED REGISTERED

## 2018-12-05 PROCEDURE — 6360000002 HC RX W HCPCS: Performed by: NURSE ANESTHETIST, CERTIFIED REGISTERED

## 2018-12-05 PROCEDURE — 2709999900 HC NON-CHARGEABLE SUPPLY: Performed by: INTERNAL MEDICINE

## 2018-12-05 PROCEDURE — 7100000011 HC PHASE II RECOVERY - ADDTL 15 MIN: Performed by: INTERNAL MEDICINE

## 2018-12-05 PROCEDURE — 3609012800 HC EGD DIAGNOSTIC ONLY: Performed by: INTERNAL MEDICINE

## 2018-12-05 RX ORDER — SODIUM CHLORIDE 9 MG/ML
INJECTION, SOLUTION INTRAVENOUS CONTINUOUS PRN
Status: DISCONTINUED | OUTPATIENT
Start: 2018-12-05 | End: 2018-12-05 | Stop reason: SDUPTHER

## 2018-12-05 RX ORDER — LIDOCAINE HYDROCHLORIDE 20 MG/ML
INJECTION, SOLUTION INFILTRATION; PERINEURAL PRN
Status: DISCONTINUED | OUTPATIENT
Start: 2018-12-05 | End: 2018-12-05 | Stop reason: SDUPTHER

## 2018-12-05 RX ORDER — ONDANSETRON 2 MG/ML
INJECTION INTRAMUSCULAR; INTRAVENOUS PRN
Status: DISCONTINUED | OUTPATIENT
Start: 2018-12-05 | End: 2018-12-05 | Stop reason: SDUPTHER

## 2018-12-05 RX ORDER — DEXAMETHASONE SODIUM PHOSPHATE 4 MG/ML
INJECTION, SOLUTION INTRA-ARTICULAR; INTRALESIONAL; INTRAMUSCULAR; INTRAVENOUS; SOFT TISSUE PRN
Status: DISCONTINUED | OUTPATIENT
Start: 2018-12-05 | End: 2018-12-05 | Stop reason: SDUPTHER

## 2018-12-05 RX ORDER — PROPOFOL 10 MG/ML
INJECTION, EMULSION INTRAVENOUS PRN
Status: DISCONTINUED | OUTPATIENT
Start: 2018-12-05 | End: 2018-12-05 | Stop reason: SDUPTHER

## 2018-12-05 RX ORDER — SODIUM CHLORIDE, SODIUM LACTATE, POTASSIUM CHLORIDE, CALCIUM CHLORIDE 600; 310; 30; 20 MG/100ML; MG/100ML; MG/100ML; MG/100ML
INJECTION, SOLUTION INTRAVENOUS CONTINUOUS
Status: DISCONTINUED | OUTPATIENT
Start: 2018-12-05 | End: 2018-12-05 | Stop reason: HOSPADM

## 2018-12-05 RX ADMIN — SODIUM CHLORIDE, POTASSIUM CHLORIDE, SODIUM LACTATE AND CALCIUM CHLORIDE: 600; 310; 30; 20 INJECTION, SOLUTION INTRAVENOUS at 08:16

## 2018-12-05 RX ADMIN — PROPOFOL 20 MG: 10 INJECTION, EMULSION INTRAVENOUS at 09:14

## 2018-12-05 RX ADMIN — LIDOCAINE HYDROCHLORIDE 100 MG: 20 INJECTION, SOLUTION INFILTRATION; PERINEURAL at 09:09

## 2018-12-05 RX ADMIN — ONDANSETRON HYDROCHLORIDE 4 MG: 2 SOLUTION INTRAMUSCULAR; INTRAVENOUS at 09:05

## 2018-12-05 RX ADMIN — SODIUM CHLORIDE: 9 INJECTION, SOLUTION INTRAVENOUS at 09:00

## 2018-12-05 RX ADMIN — PROPOFOL 50 MG: 10 INJECTION, EMULSION INTRAVENOUS at 09:09

## 2018-12-05 RX ADMIN — PROPOFOL 30 MG: 10 INJECTION, EMULSION INTRAVENOUS at 09:10

## 2018-12-05 RX ADMIN — DEXAMETHASONE SODIUM PHOSPHATE 8 MG: 4 INJECTION, SOLUTION INTRAMUSCULAR; INTRAVENOUS at 09:05

## 2018-12-05 RX ADMIN — PROPOFOL 20 MG: 10 INJECTION, EMULSION INTRAVENOUS at 09:11

## 2018-12-05 ASSESSMENT — PAIN SCALES - GENERAL
PAINLEVEL_OUTOF10: 0
PAINLEVEL_OUTOF10: 0

## 2018-12-05 ASSESSMENT — PAIN - FUNCTIONAL ASSESSMENT: PAIN_FUNCTIONAL_ASSESSMENT: 0-10

## 2018-12-05 NOTE — PROGRESS NOTES
1346 Received from OR, family at bedside. Placed on monitor. Vital signs stable. Call light in reach. 0920 Warm blankets given for comfort. Denies other needs. 4719 Discharge instructions reviewed with patient/family. 2323 Dressing with call light in reach. 1002 Discharge to car.   Rose Arrington

## 2018-12-05 NOTE — ANESTHESIA POSTPROCEDURE EVALUATION
Department of Anesthesiology  Postprocedure Note    Patient: Shanda Kirkpatrick  MRN: [id number]  YOB: 1945  Date of evaluation: 12/5/2018  Time:  9:23 AM     Procedure Summary     Date:  12/05/18 Room / Location:  [address] [facility] OR    Anesthesia Start:  2710 Anesthesia Stop:  1277    Procedure:  EGD DIAGNOSTIC ONLY (N/A ) Diagnosis:  (GERDS)    Surgeon:  Edil Morgan MD Responsible Provider:  MIGUEL Jensen CRNA    Anesthesia Type:  MAC ASA Status:  3          Anesthesia Type: MAC    Yashira Phase I:  10    Yashira Phase II:  10    Last vitals: Reviewed and per EMR flowsheets.        Anesthesia Post Evaluation    Patient location during evaluation: bedside  Patient participation: complete - patient participated  Level of consciousness: awake and alert  Pain score: 0  Airway patency: patent  Nausea & Vomiting: no nausea and no vomiting  Complications: no  Cardiovascular status: blood pressure returned to baseline  Respiratory status: acceptable, room air, spontaneous ventilation and nonlabored ventilation  Hydration status: euvolemic

## 2018-12-05 NOTE — BRIEF OP NOTE
Brief Postoperative Note  ______________________________________________________________    Patient: Kaitlynn Leon  YOB: 1945  MRN: [id number]  Date of Procedure: 12/5/2018    Pre-Op Diagnosis: GERDS    Post-Op Diagnosis: Same hiatus hernia       Procedure(s):  EGD DIAGNOSTIC ONLY    Anesthesia: Monitor Anesthesia Care    Surgeon(s):  Caroline Cabrera MD    Estimated Blood Loss (mL): less than 50     Complications: None                   Caroline Cabrera MD  Date: 12/5/2018  Time: 9:21 AM

## 2019-02-26 ENCOUNTER — HOSPITAL ENCOUNTER (OUTPATIENT)
Dept: PHYSICAL THERAPY | Age: 74
Setting detail: THERAPIES SERIES
Discharge: HOME | End: 2019-02-26
Payer: MEDICARE

## 2019-02-26 PROCEDURE — 97535 SELF CARE MNGMENT TRAINING: CPT

## 2019-02-26 PROCEDURE — 97140 MANUAL THERAPY 1/> REGIONS: CPT

## 2019-02-26 PROCEDURE — 97035 APP MDLTY 1+ULTRASOUND EA 15: CPT

## 2019-02-26 PROCEDURE — 97162 PT EVAL MOD COMPLEX 30 MIN: CPT

## 2019-02-26 ASSESSMENT — PAIN DESCRIPTION - PAIN TYPE: TYPE: CHRONIC PAIN

## 2019-02-26 ASSESSMENT — PAIN DESCRIPTION - ONSET: ONSET: AWAKENED FROM SLEEP

## 2019-02-26 ASSESSMENT — PAIN DESCRIPTION - ORIENTATION: ORIENTATION: LEFT;OUTER

## 2019-02-26 ASSESSMENT — PAIN DESCRIPTION - LOCATION: LOCATION: LEG

## 2019-02-26 ASSESSMENT — PAIN DESCRIPTION - FREQUENCY: FREQUENCY: INTERMITTENT

## 2019-03-04 ENCOUNTER — HOSPITAL ENCOUNTER (OUTPATIENT)
Dept: PHYSICAL THERAPY | Age: 74
Setting detail: THERAPIES SERIES
Discharge: HOME | End: 2019-03-04
Payer: MEDICARE

## 2019-03-04 ENCOUNTER — OFFICE VISIT (OUTPATIENT)
Dept: INTERNAL MEDICINE CLINIC | Age: 74
End: 2019-03-04
Payer: MEDICARE

## 2019-03-04 VITALS
OXYGEN SATURATION: 98 % | WEIGHT: 146 LBS | DIASTOLIC BLOOD PRESSURE: 62 MMHG | HEART RATE: 93 BPM | SYSTOLIC BLOOD PRESSURE: 119 MMHG | RESPIRATION RATE: 15 BRPM | BODY MASS INDEX: 28.51 KG/M2

## 2019-03-04 DIAGNOSIS — K21.9 GASTROESOPHAGEAL REFLUX DISEASE, ESOPHAGITIS PRESENCE NOT SPECIFIED: ICD-10-CM

## 2019-03-04 DIAGNOSIS — J01.00 ACUTE NON-RECURRENT MAXILLARY SINUSITIS: ICD-10-CM

## 2019-03-04 DIAGNOSIS — R42 VERTIGO: ICD-10-CM

## 2019-03-04 DIAGNOSIS — G44.311 INTRACTABLE ACUTE POST-TRAUMATIC HEADACHE: ICD-10-CM

## 2019-03-04 DIAGNOSIS — R30.0 DYSURIA: Primary | ICD-10-CM

## 2019-03-04 PROBLEM — G44.309 POST-TRAUMATIC HEADACHE: Status: ACTIVE | Noted: 2019-03-04

## 2019-03-04 LAB
BILIRUBIN, POC: ABNORMAL
BLOOD URINE, POC: ABNORMAL
CLARITY, POC: ABNORMAL
COLOR, POC: ABNORMAL
GLUCOSE URINE, POC: ABNORMAL
KETONES, POC: ABNORMAL
LEUKOCYTE EST, POC: ABNORMAL
NITRITE, POC: POSITIVE
PH, POC: 6
PROTEIN, POC: ABNORMAL
SPECIFIC GRAVITY, POC: 1.02
UROBILINOGEN, POC: ABNORMAL

## 2019-03-04 PROCEDURE — G8417 CALC BMI ABV UP PARAM F/U: HCPCS | Performed by: INTERNAL MEDICINE

## 2019-03-04 PROCEDURE — 1123F ACP DISCUSS/DSCN MKR DOCD: CPT | Performed by: INTERNAL MEDICINE

## 2019-03-04 PROCEDURE — 1101F PT FALLS ASSESS-DOCD LE1/YR: CPT | Performed by: INTERNAL MEDICINE

## 2019-03-04 PROCEDURE — 1090F PRES/ABSN URINE INCON ASSESS: CPT | Performed by: INTERNAL MEDICINE

## 2019-03-04 PROCEDURE — G8399 PT W/DXA RESULTS DOCUMENT: HCPCS | Performed by: INTERNAL MEDICINE

## 2019-03-04 PROCEDURE — 81002 URINALYSIS NONAUTO W/O SCOPE: CPT | Performed by: INTERNAL MEDICINE

## 2019-03-04 PROCEDURE — 97110 THERAPEUTIC EXERCISES: CPT

## 2019-03-04 PROCEDURE — 97140 MANUAL THERAPY 1/> REGIONS: CPT

## 2019-03-04 PROCEDURE — 99214 OFFICE O/P EST MOD 30 MIN: CPT | Performed by: INTERNAL MEDICINE

## 2019-03-04 PROCEDURE — 97035 APP MDLTY 1+ULTRASOUND EA 15: CPT

## 2019-03-04 PROCEDURE — 4040F PNEUMOC VAC/ADMIN/RCVD: CPT | Performed by: INTERNAL MEDICINE

## 2019-03-04 PROCEDURE — G8482 FLU IMMUNIZE ORDER/ADMIN: HCPCS | Performed by: INTERNAL MEDICINE

## 2019-03-04 PROCEDURE — G8427 DOCREV CUR MEDS BY ELIG CLIN: HCPCS | Performed by: INTERNAL MEDICINE

## 2019-03-04 PROCEDURE — 1036F TOBACCO NON-USER: CPT | Performed by: INTERNAL MEDICINE

## 2019-03-04 PROCEDURE — 3017F COLORECTAL CA SCREEN DOC REV: CPT | Performed by: INTERNAL MEDICINE

## 2019-03-04 RX ORDER — PREDNISONE 1 MG/1
TABLET ORAL
Qty: 21 TABLET | Refills: 0 | Status: SHIPPED | OUTPATIENT
Start: 2019-03-04 | End: 2019-05-28 | Stop reason: CLARIF

## 2019-03-04 RX ORDER — SULFAMETHOXAZOLE AND TRIMETHOPRIM 400; 80 MG/1; MG/1
1 TABLET ORAL DAILY
Qty: 10 TABLET | Refills: 0 | Status: SHIPPED | OUTPATIENT
Start: 2019-03-04 | End: 2019-03-14

## 2019-03-04 RX ORDER — MECLIZINE HCL 12.5 MG/1
12.5 TABLET ORAL 3 TIMES DAILY PRN
Qty: 30 TABLET | Refills: 0 | Status: SHIPPED | OUTPATIENT
Start: 2019-03-04 | End: 2019-03-14

## 2019-03-04 RX ORDER — OMEPRAZOLE 40 MG/1
40 CAPSULE, DELAYED RELEASE ORAL
Qty: 90 CAPSULE | Refills: 1 | Status: SHIPPED | OUTPATIENT
Start: 2019-03-04 | End: 2019-05-28 | Stop reason: CLARIF

## 2019-03-04 ASSESSMENT — PATIENT HEALTH QUESTIONNAIRE - PHQ9
1. LITTLE INTEREST OR PLEASURE IN DOING THINGS: 0
SUM OF ALL RESPONSES TO PHQ QUESTIONS 1-9: 0
2. FEELING DOWN, DEPRESSED OR HOPELESS: 0
SUM OF ALL RESPONSES TO PHQ QUESTIONS 1-9: 0
SUM OF ALL RESPONSES TO PHQ9 QUESTIONS 1 & 2: 0
DEPRESSION UNABLE TO ASSESS: FUNCTIONAL CAPACITY MOTIVATION LIMITS ACCURACY

## 2019-03-06 ENCOUNTER — HOSPITAL ENCOUNTER (OUTPATIENT)
Dept: PHYSICAL THERAPY | Age: 74
Setting detail: THERAPIES SERIES
Discharge: HOME | End: 2019-03-06
Payer: MEDICARE

## 2019-03-06 PROCEDURE — 97110 THERAPEUTIC EXERCISES: CPT

## 2019-03-06 PROCEDURE — 97140 MANUAL THERAPY 1/> REGIONS: CPT

## 2019-03-06 PROCEDURE — 97035 APP MDLTY 1+ULTRASOUND EA 15: CPT

## 2019-03-08 ENCOUNTER — HOSPITAL ENCOUNTER (OUTPATIENT)
Dept: PHYSICAL THERAPY | Age: 74
Discharge: HOME | End: 2019-03-08

## 2019-03-08 NOTE — FLOWSHEET NOTE
Physical Therapy  Cancellation/No-show Note  Patient Name:  Rc Belle  :  1945   Date:  3/8/2019  Cancelled visits to date: 0  No-shows to date: 0    For today's appointment patient:  [x]  Cancelled  []  Rescheduled appointment  []  No-show     Reason given by patient:  []  Patient ill  []  Conflicting appointment  []  No transportation    []  Conflict with work  []  No reason given  [x]  Other:   Thought apt was at diff time    Comments:      Electronically signed by:  Mauro Brock, 3/8/2019, 8:40 AM

## 2019-03-08 NOTE — PLAN OF CARE
Physical Therapy  Cancellation/No-show Note  Patient Name:  Eleazar Reilly  :  1945   Date:  3/8/2019  Cancelled visits to date: 1  No-shows to date: 0    For today's appointment patient:  [x]  Cancelled  []  Rescheduled appointment  []  No-show     Reason given by patient:  []  Patient ill  []  Conflicting appointment  []  No transportation    []  Conflict with work  []  No reason given  [x]  Other:   Patient called approx 8:40 stating she forgot about her appointment today.    Comments:      Electronically signed by:  Dayanara Ness, PT 3/8/2019, 8:45 AM

## 2019-03-11 ENCOUNTER — HOSPITAL ENCOUNTER (OUTPATIENT)
Dept: PHYSICAL THERAPY | Age: 74
Setting detail: THERAPIES SERIES
Discharge: HOME | End: 2019-03-11
Payer: MEDICARE

## 2019-03-11 PROCEDURE — 97140 MANUAL THERAPY 1/> REGIONS: CPT

## 2019-03-11 PROCEDURE — 97035 APP MDLTY 1+ULTRASOUND EA 15: CPT

## 2019-03-13 ENCOUNTER — HOSPITAL ENCOUNTER (OUTPATIENT)
Dept: PHYSICAL THERAPY | Age: 74
Setting detail: THERAPIES SERIES
Discharge: HOME | End: 2019-03-13
Payer: MEDICARE

## 2019-03-13 PROCEDURE — 97140 MANUAL THERAPY 1/> REGIONS: CPT

## 2019-03-13 PROCEDURE — 97035 APP MDLTY 1+ULTRASOUND EA 15: CPT

## 2019-03-15 ENCOUNTER — HOSPITAL ENCOUNTER (OUTPATIENT)
Dept: PHYSICAL THERAPY | Age: 74
Setting detail: THERAPIES SERIES
Discharge: HOME | End: 2019-03-15
Payer: MEDICARE

## 2019-03-15 PROCEDURE — 97110 THERAPEUTIC EXERCISES: CPT

## 2019-03-15 PROCEDURE — 97035 APP MDLTY 1+ULTRASOUND EA 15: CPT

## 2019-03-15 PROCEDURE — 97140 MANUAL THERAPY 1/> REGIONS: CPT

## 2019-03-15 PROCEDURE — 97033 APP MDLTY 1+IONTPHRSIS EA 15: CPT

## 2019-03-22 ENCOUNTER — HOSPITAL ENCOUNTER (OUTPATIENT)
Dept: PHYSICAL THERAPY | Age: 74
Setting detail: THERAPIES SERIES
Discharge: HOME | End: 2019-03-22
Payer: MEDICARE

## 2019-03-22 PROCEDURE — 97012 MECHANICAL TRACTION THERAPY: CPT

## 2019-03-22 PROCEDURE — 97035 APP MDLTY 1+ULTRASOUND EA 15: CPT

## 2019-03-22 PROCEDURE — 97535 SELF CARE MNGMENT TRAINING: CPT

## 2019-03-25 ENCOUNTER — HOSPITAL ENCOUNTER (OUTPATIENT)
Dept: PHYSICAL THERAPY | Age: 74
Discharge: HOME | End: 2019-03-25

## 2019-03-27 ENCOUNTER — HOSPITAL ENCOUNTER (OUTPATIENT)
Dept: PHYSICAL THERAPY | Age: 74
Setting detail: THERAPIES SERIES
Discharge: HOME | End: 2019-03-27
Payer: MEDICARE

## 2019-03-27 PROCEDURE — 97035 APP MDLTY 1+ULTRASOUND EA 15: CPT

## 2019-03-27 PROCEDURE — 97110 THERAPEUTIC EXERCISES: CPT

## 2019-03-27 PROCEDURE — 97012 MECHANICAL TRACTION THERAPY: CPT

## 2019-03-29 ENCOUNTER — HOSPITAL ENCOUNTER (OUTPATIENT)
Dept: PHYSICAL THERAPY | Age: 74
Setting detail: THERAPIES SERIES
Discharge: HOME | End: 2019-03-29
Payer: MEDICARE

## 2019-03-29 PROCEDURE — 97012 MECHANICAL TRACTION THERAPY: CPT

## 2019-03-29 PROCEDURE — 97035 APP MDLTY 1+ULTRASOUND EA 15: CPT

## 2019-03-29 PROCEDURE — 97110 THERAPEUTIC EXERCISES: CPT

## 2019-04-02 ENCOUNTER — HOSPITAL ENCOUNTER (OUTPATIENT)
Dept: PHYSICAL THERAPY | Age: 74
Setting detail: THERAPIES SERIES
Discharge: HOME | End: 2019-04-02
Payer: MEDICARE

## 2019-04-02 PROCEDURE — 97035 APP MDLTY 1+ULTRASOUND EA 15: CPT

## 2019-04-02 PROCEDURE — 97012 MECHANICAL TRACTION THERAPY: CPT

## 2019-04-02 PROCEDURE — 97110 THERAPEUTIC EXERCISES: CPT

## 2019-04-02 NOTE — FLOWSHEET NOTE
Outpatient Physical Therapy  [place]           [x] Phone: [phone]   Fax: [phone]  [facility]           [] Phone: [phone]   Fax: [phone]        Physical Therapy Daily Treatment Note  Date:  2019    Patient Name:  Constanza Sheth    :  1945  MRN: [id number]  Restrictions/Precautions: Other position/activity restrictions: No formal restrictions  Diagnosis:   Diagnosis: ITB syndrome, s/p total knee arthroplasty left knee (9 yrs ago.)  Date of Injury/Surgery:   Treatment Diagnosis: Treatment Diagnosis: antalgic gait and transfers, dec LLE strength, pain left hip and ITB    Insurance/Certification information: PT Insurance Information: Medicare/[employer]thing   Referring Physician:  Referring Practitioner: eufemia Ware assistant  Next Doctor Visit:  May 8, 2019 w/Dr. Gifty Tafoya (orthopeodic surgeon)  Plan of care signed (Y/N):  N  Outcome Measure: LEFS  Visit# / total visits:  10/12  Pain level: 4/10 (took Aleve prior to appointment)  POC DATE RANGE:  19 - 19 (end date extended to allow for completion of POC.)       Goals:          Long term goals  Time Frame for Long term goals : In 4 weeks, patient will  Long term goal 1: demonstate compliance and independence w/HEP.3-13-19 MET  Long term goal 2: score at least 79/80 on LEFS as indication of significant functional improvement. Long term goal 3: ascend/descend (1) flight of stairs, using (1) HR for light support, reciprocal manner w/o left leg pain. Long term goal 4: sleep through the night (at least 6 hours) w/o waking d/t left leg pain. Long term goal 5: perform transfers and gait in facility and community w/o pain/antalgia. Summary of Evaluation: Assessment: Pt is a 74 y.o. female w/ DX left ITB syndrome. Pt reports left hip/leg soreness x approx 2 mo. Pt does have history of sciatica and has had injections as recently as recently as 2 mo. ago. Per pt, left knee was examined and x-ray's and is stable. traction set-up)  30# of pull  Static  15 min LLE LAD using traction unit  LEs on bolster  Table in lowest position  Towel wrap around ankle  Figure 8 strap around lower leg/foot/ankle w/a loop positioned plantar surface of tennis shoe (for connecting traction unit to; similar to a bucks traction set-up)  34# of pull  Static  15 min LLE LAD using traction unit  LEs on bolster  Table in lowest position  Towel wrap around ankle  Figure 8 strap around lower leg/foot/ankle w/a loop positioned plantar surface of tennis shoe (for connecting traction unit to; similar to a bucks traction set-up)  34# of pull  Static  15 min LLE LAD using traction unit  LEs on bolster  Table in lowest position  Towel wrap around ankle  Figure 8 strap around lower leg/foot/ankle w/a loop positioned plantar surface of tennis shoe (for connecting traction unit to; similar to a bucks traction set-up)  34# of pull  Static  15 min                PROPRIOCEPTION                                                MODALITIES See below See below See below See below See below                               Other Therapeutic Activities/Education:    N/A    Home Exercise Program:  Left ITB self-massage. 3-4-19 Low bridges, Hip flexor stretch as able in standing, hip adduction isometrics      Manual Treatments:    N/A    Modalities:  Left greater trochanter bursa w/patient right side-lying and pillow b/t knees  US/phonophoresis  100% DC  1.5 w/cm2  1.0 MgHz  10 min      Communication with other providers:  N/A      Assessment:  (Response towards treatment session) (Pain Rating)  1-2/10 pain post treatment  Gait non-antalgic    Plan for Next Session:    Phonophoresis left greater trochanteric bursa  LAD as above  Hip strengthening/glutes    Time In / Time Out:   2425/1128      Timed Code/Total Treatment Minutes: 20' traction, TE 28', 15' phonophoresis /Total 63'      Next Progress Note due:  Every 10 visits      Plan of Care Interventions:  [x] Therapeutic Exercise  [x] Modalities:  [x] Therapeutic Activity     [x] Ultrasound  [] Estim  [x] Gait Training      [] Cervical Traction [x] Lumbar Traction  [x] Neuromuscular Re-education    [x] Cold/hotpack [x] Phonophoresis   [x] Instruction in HEP      [] Vasopneumatic   [x] Dry Needling    [x] Manual Therapy               [] Aquatic Therapy               Electronically signed by:  Artur Zavala PTA 4/2/2019, 10:27 AM

## 2019-04-04 ENCOUNTER — HOSPITAL ENCOUNTER (OUTPATIENT)
Dept: PHYSICAL THERAPY | Age: 74
Setting detail: THERAPIES SERIES
Discharge: HOME | End: 2019-04-04
Payer: MEDICARE

## 2019-04-04 PROCEDURE — 97110 THERAPEUTIC EXERCISES: CPT

## 2019-04-04 PROCEDURE — 97012 MECHANICAL TRACTION THERAPY: CPT

## 2019-04-04 PROCEDURE — 97035 APP MDLTY 1+ULTRASOUND EA 15: CPT

## 2019-04-04 NOTE — FLOWSHEET NOTE
Outpatient Physical Therapy  [place]           [x] Phone: [phone]   Fax: [phone]  [facility]           [] Phone: [phone]   Fax: [phone]        Physical Therapy Daily Treatment Note  Date:  2019    Patient Name:  Masoud Rose    :  1945  MRN: [id number]  Restrictions/Precautions: Other position/activity restrictions: No formal restrictions  Diagnosis:   Diagnosis: ITB syndrome, s/p total knee arthroplasty left knee (9 yrs ago.)  Date of Injury/Surgery:   Treatment Diagnosis: Treatment Diagnosis: antalgic gait and transfers, dec LLE strength, pain left hip and ITB    Insurance/Certification information: PT Insurance Information: Medicare/[place]   Referring Physician:  Referring Practitioner: eufemia Crane assistant  Next Doctor Visit:  May 8, 2019 w/Dr. Ada Charles (orthopeodic surgeon)  Plan of care signed (Y/N):  N  Outcome Measure: LEFS  Visit# / total visits:    Pain level: 6/10 (took Aleve prior to appointment)  POC DATE RANGE:  19 - 19 (end date extended to allow for completion of POC.)       Goals:          Long term goals  Time Frame for Long term goals : In 4 weeks, patient will  Long term goal 1: demonstate compliance and independence w/HEP.3-13-19 MET  Long term goal 2: score at least 79/80 on LEFS as indication of significant functional improvement. Long term goal 3: ascend/descend (1) flight of stairs, using (1) HR for light support, reciprocal manner w/o left leg pain. Long term goal 4: sleep through the night (at least 6 hours) w/o waking d/t left leg pain. Long term goal 5: perform transfers and gait in facility and community w/o pain/antalgia. Summary of Evaluation: Assessment: Pt is a 74 y.o. female w/ DX left ITB syndrome. Pt reports left hip/leg soreness x approx 2 mo. Pt does have history of sciatica and has had injections as recently as recently as 2 mo. ago. Per pt, left knee was examined and x-ray's and is stable. 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 Worse:  getting up from chairs, going up steps, sleeping juan. on left side (soreness in leg and knee/hip.)  Better:  walking, taking Tylenol or Aleve. Today, patient presents w/symptoms consistent w/ITBS w/resulting greater trochanteric bursitis. Subjective:  Pt states that yesterday her pain was about a 10/10 last night she had to get up and take pain meds. She was able to go golfing which she hasn't been able to do for quite some time. The traction was beneficial and she had no pain after. \"It hurts from from hip to my knee. \"  Reports occasional groin pain Left side. Any changes in Ambulatory Summary Sheet?   None        Objective:   Pain 4/10  Gait and transfers mildly antalgic  TTP left ITB and lateral knee    Exercises: (No more than 4 columns)   Exercise/Equipment 3/27/19  #8 3/29/19 #9 4/2/19 #10 4/4/19 #11            WARM UP                        TABLE       Low bridges       Hip adductor isometrics                               STANDING       Hip flexor stretch as able in standing       EQUIPMENT       Rotary Hip Extension 2 x 10  25#    Adduction  1 x 10  25# Extension 2 x 10  25#    Adduction  1 x 10  25# Extension 3 x 10  25#    Abduction  1 x 10  25# Extension 3 x 10  25#    Abduction 2 x 10  25#    Adduction 2 x 10 #25   Shuttle 2 cords  1 x 30   3 cords  1 x 10 3 cords 3 x 10 reps 3 cords 3 x 10 reps 3 cords 3 x 10 reps   Cybex knee flexion 30#  1 x 15  25#  1 x 15 30-# 3 x 10 reps 30-# 3 x 10 reps 30-# 4 x 10 reps   MANUAL       LAD LLE LAD using traction unit  LEs on bolster  Table in lowest position  Towel wrap around ankle  Figure 8 strap around lower leg/foot/ankle w/a loop positioned plantar surface of tennis shoe (for connecting traction unit to; similar to a bucks traction set-up)  34# of pull  Static  15 min LLE LAD using traction unit  LEs on bolster  Table in lowest position  Towel wrap around ankle  Figure 8 strap around lower leg/foot/ankle w/a loop positioned plantar surface of tennis shoe (for connecting traction unit to; similar to a bucks traction set-up)  34# of pull  Static  15 min LLE LAD using traction unit  LEs on bolster  Table in lowest position  Towel wrap around ankle  Figure 8 strap around lower leg/foot/ankle w/a loop positioned plantar surface of tennis shoe (for connecting traction unit to; similar to a bucks traction set-up)  34# of pull  Static  15 min LLE LAD using traction unit  LEs on bolster  Table in lowest position  Towel wrap around ankle  Figure 8 strap around lower leg/foot/ankle w/a loop positioned plantar surface of tennis shoe (for connecting traction unit to; similar to a bucks traction set-up)  36# of pull  Static  15 min               PROPRIOCEPTION                                          MODALITIES See below See below See below See below                            Other Therapeutic Activities/Education:    N/A    Home Exercise Program:  Left ITB self-massage. 3-4-19 Low bridges, Hip flexor stretch as able in standing, hip adduction isometrics      Manual Treatments:    N/A    Modalities:  Left greater trochanter bursa and lat knee ITB insertion w/patient right side-lying and pillow b/t knees  US/phonophoresis  100% DC  1.5 w/cm2  1.0 MgHz  10 min      Communication with other providers:  N/A      Assessment:  (Response towards treatment session) (Pain Rating)  1-2/10 pain post treatment  Gait non-antalgic    Plan for Next Session:    Phonophoresis left greater trochanteric bursa  LAD as above  Hip strengthening/glutes    Time In / Time Out:   1110/1212      Timed Code/Total Treatment Minutes: 20' traction, TE 28', 15' phonophoresis /Total 62'      Next Progress Note due:  Every 10 visits      Plan of Care Interventions:  [x] Therapeutic Exercise  [x] Modalities:  [x] Therapeutic Activity     [x] Ultrasound  [] Estim  [x] Gait Training      [] Cervical Traction [x] Lumbar Traction  [x] Neuromuscular Re-education    [x] Cold/hotpack [x] Phonophoresis   [x] Instruction in HEP      [] Vasopneumatic   [x] Dry Needling    [x] Manual Therapy               [] Aquatic Therapy               Electronically signed by:  Gladis Rivera, BERNARDO 4/4/2019, 11:14 AM

## 2019-04-08 ENCOUNTER — HOSPITAL ENCOUNTER (OUTPATIENT)
Dept: PHYSICAL THERAPY | Age: 74
Setting detail: THERAPIES SERIES
Discharge: HOME | End: 2019-04-08
Payer: MEDICARE

## 2019-04-08 PROCEDURE — 97110 THERAPEUTIC EXERCISES: CPT

## 2019-04-08 PROCEDURE — 97035 APP MDLTY 1+ULTRASOUND EA 15: CPT

## 2019-04-08 PROCEDURE — 97012 MECHANICAL TRACTION THERAPY: CPT

## 2019-04-08 NOTE — FLOWSHEET NOTE
Outpatient Physical Therapy  [place]           [x] Phone: [phone]   Fax: [phone]  [place]           [] Phone: [phone]   Fax: [phone]        Physical Therapy Daily Treatment Note  Date:  2019    Patient Name:  Charly Parekh    :  1945  MRN: [id number]  Restrictions/Precautions: Other position/activity restrictions: No formal restrictions  Diagnosis:   Diagnosis: ITB syndrome, s/p total knee arthroplasty left knee (9 yrs ago.)  Date of Injury/Surgery:   Treatment Diagnosis: Treatment Diagnosis: antalgic gait and transfers, dec LLE strength, pain left hip and ITB    Insurance/Certification information: PT Insurance Information: Medicare/[employer]   Referring Physician:  Referring Practitioner: eufemia Fernandez assistant  Next Doctor Visit:  May 8, 2019 w/Dr. Fidelina Obrien (orthopeodic surgeon)  Plan of care signed (Y/N):  N  Outcome Measure: LEFS  Visit# / total visits:    Pain level: 5-6/10 (took Aleve prior to appointment)  POC DATE RANGE:  19 - 19 (end date extended to allow for completion of POC.)       Goals:          Long term goals  Time Frame for Long term goals : In 4 weeks, patient will  Long term goal 1: demonstate compliance and independence w/HEP.3-13-19 MET  Long term goal 2: score at least 79/80 on LEFS as indication of significant functional improvement. Long term goal 3: ascend/descend (1) flight of stairs, using (1) HR for light support, reciprocal manner w/o left leg pain. Long term goal 4: sleep through the night (at least 6 hours) w/o waking d/t left leg pain. - UNMET 19  Long term goal 5: perform transfers and gait in facility and community w/o pain/antalgia. - UNMET 19    Summary of Evaluation: Assessment: Pt is a 74 y.o. female w/ DX left ITB syndrome. Pt reports left hip/leg soreness x approx 2 mo. Pt does have history of sciatica and has had injections as recently as recently as 2 mo. ago.   Per pt, left knee was examined and x-ray's and is stable. 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 Worse:  getting up from chairs, going up steps, sleeping juan. on left side (soreness in leg and knee/hip.)  Better:  walking, taking Tylenol or Aleve. Today, patient presents w/symptoms consistent w/ITBS w/resulting greater trochanteric bursitis. Subjective:  Patient feels there has been minimal benefit from physical therapy thus far. Reports waking last  Night with pain. Taking Aleve helps. Pt is planning on calling Dr. Mari Cabello to see if an injection would help. Any changes in Ambulatory Summary Sheet? Objective:   Pain 4-5/10 left lateral/posterolateral hip and lateral knee  Gait and transfers mildly antalgic prior to tx  Gait and transfers non-antalgic after tx.   TTP left ITB and lateral knee    Exercises: (No more than 4 columns)   Exercise/Equipment 3/27/19  #8 3/29/19 #9 4/2/19 #10 4/4/19 #11 4/08/19  #12             WARM UP                           TABLE        Low bridges        Hip adductor isometrics                                   STANDING        Hip flexor stretch as able in standing        EQUIPMENT        Rotary Hip Extension 2 x 10  25#    Adduction  1 x 10  25# Extension 2 x 10  25#    Adduction  1 x 10  25# Extension 3 x 10  25#    Abduction  1 x 10  25# Extension 3 x 10  25#    Abduction 2 x 10  25#    Adduction 2 x 10 #25 Extension 3 x 10  25#    Abduction 2 x 10  25#    Adduction 2 x 10 #25   Shuttle 2 cords  1 x 30   3 cords  1 x 10 3 cords 3 x 10 reps 3 cords 3 x 10 reps 3 cords 3 x 10 reps 3 cords 2 x 20 reps   Cybex knee flexion 30#  1 x 15  25#  1 x 15 30-# 3 x 10 reps 30-# 3 x 10 reps 30-# 4 x 10 reps 30-# 4 x 10 reps   MANUAL        LAD LLE LAD using traction unit  LEs on bolster  Table in lowest position  Towel wrap around ankle  Figure 8 strap around lower leg/foot/ankle w/a loop positioned plantar surface of tennis shoe (for connecting traction unit to; similar to a bucks traction set-up)  34# of pull  Static  15 min LLE LAD using traction unit  LEs on bolster  Table in lowest position  Towel wrap around ankle  Figure 8 strap around lower leg/foot/ankle w/a loop positioned plantar surface of tennis shoe (for connecting traction unit to; similar to a bucks traction set-up)  34# of pull  Static  15 min LLE LAD using traction unit  LEs on bolster  Table in lowest position  Towel wrap around ankle  Figure 8 strap around lower leg/foot/ankle w/a loop positioned plantar surface of tennis shoe (for connecting traction unit to; similar to a bucks traction set-up)  34# of pull  Static  15 min LLE LAD using traction unit  LEs on bolster  Table in lowest position  Towel wrap around ankle  Figure 8 strap around lower leg/foot/ankle w/a loop positioned plantar surface of tennis shoe (for connecting traction unit to; similar to a bucks traction set-up)  36# of pull  Static  15 min LLE LAD using traction unit  LEs on bolster  Table in lowest position  Towel wrap around ankle  Figure 8 strap around lower leg/foot/ankle w/a loop positioned plantar surface of tennis shoe (for connecting traction unit to; similar to a bucks traction set-up)  38# of pull  Static  15 min                PROPRIOCEPTION                                                MODALITIES See below See below See below See below                                Other Therapeutic Activities/Education:    N/A    Home Exercise Program:  Left ITB self-massage. 3-4-19 Low bridges, Hip flexor stretch as able in standing, hip adduction isometrics      Manual Treatments:    N/A    Modalities:  Left greater trochanter bursa and lat knee ITB insertion w/patient right side-lying and pillow b/t knees  US/phonophoresis  100% DC  1.5 w/cm2  1.0 MgHz  10 min left greater trochanter bursa; 6 min left lateral knee ITB insertion. Communication with other providers:  N/A      Assessment:  (Response towards treatment session) (Pain Rating)  Progress Note:  Goal #1 met  All others unmet. Plateau/conservative treatment only temporarily beneficial.  1-2/10 pain post treatment  Gait non-antalgic    Plan for Next Session:    Hold pending result of consultation w/Dr. Erin Street.       Time In / Time Out:   1050/1150      Timed Code/Total Treatment Minutes: 25' traction, TE 24', 18' phonophoresis /Total 60'      Next Progress Note due:  Every 10 visits      Plan of Care Interventions:  [x] Therapeutic Exercise  [x] Modalities:  [x] Therapeutic Activity     [x] Ultrasound  [] Estim  [x] Gait Training      [] Cervical Traction [x] Lumbar Traction  [x] Neuromuscular Re-education    [x] Cold/hotpack [x] Phonophoresis   [x] Instruction in HEP      [] Vasopneumatic   [x] Dry Needling    [x] Manual Therapy               [] Aquatic Therapy               Electronically signed by:  Alan Vargas PT 4/8/2019, 10:52 AM

## 2019-04-09 DIAGNOSIS — M54.16 LEFT LUMBAR RADICULOPATHY: ICD-10-CM

## 2019-04-09 RX ORDER — CELECOXIB 200 MG/1
200 CAPSULE ORAL EVERY MORNING
Qty: 90 CAPSULE | Refills: 0 | Status: SHIPPED | OUTPATIENT
Start: 2019-04-09 | End: 2019-10-04 | Stop reason: SDUPTHER

## 2019-05-28 ENCOUNTER — HOSPITAL ENCOUNTER (OUTPATIENT)
Dept: CT IMAGING | Age: 74
Discharge: HOME | End: 2019-05-28
Payer: MEDICARE

## 2019-05-28 ENCOUNTER — OFFICE VISIT (OUTPATIENT)
Dept: INTERNAL MEDICINE CLINIC | Age: 74
End: 2019-05-28
Payer: MEDICARE

## 2019-05-28 ENCOUNTER — HOSPITAL ENCOUNTER (OUTPATIENT)
Age: 74
Discharge: HOME | End: 2019-05-28
Payer: MEDICARE

## 2019-05-28 ENCOUNTER — HOSPITAL ENCOUNTER (OUTPATIENT)
Age: 74
Setting detail: SPECIMEN
Discharge: HOME | End: 2019-05-28
Payer: MEDICARE

## 2019-05-28 VITALS
OXYGEN SATURATION: 97 % | HEIGHT: 60 IN | SYSTOLIC BLOOD PRESSURE: 132 MMHG | BODY MASS INDEX: 28.39 KG/M2 | HEART RATE: 88 BPM | RESPIRATION RATE: 20 BRPM | WEIGHT: 144.6 LBS | DIASTOLIC BLOOD PRESSURE: 71 MMHG

## 2019-05-28 DIAGNOSIS — R10.84 GENERALIZED ABDOMINAL PAIN: Primary | ICD-10-CM

## 2019-05-28 DIAGNOSIS — R10.84 GENERALIZED ABDOMINAL PAIN: ICD-10-CM

## 2019-05-28 DIAGNOSIS — N30.00 ACUTE CYSTITIS WITHOUT HEMATURIA: ICD-10-CM

## 2019-05-28 LAB
ALBUMIN SERPL-MCNC: 4 GM/DL (ref 3.4–5)
ALP BLD-CCNC: 96 IU/L (ref 40–128)
ALT SERPL-CCNC: 33 U/L (ref 10–40)
ANION GAP SERPL CALCULATED.3IONS-SCNC: 13 MMOL/L (ref 4–16)
AST SERPL-CCNC: 32 IU/L (ref 15–37)
BASOPHILS ABSOLUTE: 0 K/CU MM
BASOPHILS RELATIVE PERCENT: 0.6 % (ref 0–1)
BILIRUB SERPL-MCNC: 0.5 MG/DL (ref 0–1)
BILIRUBIN, POC: NEGATIVE
BLOOD URINE, POC: NEGATIVE
BUN BLDV-MCNC: 25 MG/DL (ref 6–23)
CALCIUM SERPL-MCNC: 8.9 MG/DL (ref 8.3–10.6)
CHLORIDE BLD-SCNC: 103 MMOL/L (ref 99–110)
CLARITY, POC: CLEAR
CO2: 24 MMOL/L (ref 21–32)
COLOR, POC: YELLOW
CREAT SERPL-MCNC: 1.3 MG/DL (ref 0.6–1.1)
DIFFERENTIAL TYPE: ABNORMAL
EOSINOPHILS ABSOLUTE: 0.1 K/CU MM
EOSINOPHILS RELATIVE PERCENT: 1.9 % (ref 0–3)
GFR AFRICAN AMERICAN: 48 ML/MIN/1.73M2
GFR NON-AFRICAN AMERICAN: 40 ML/MIN/1.73M2
GLUCOSE FASTING: 79 MG/DL (ref 70–99)
GLUCOSE URINE, POC: NEGATIVE
HCT VFR BLD CALC: 39 % (ref 37–47)
HEMOGLOBIN: 12.5 GM/DL (ref 12.5–16)
IMMATURE NEUTROPHIL %: 0.4 % (ref 0–0.43)
KETONES, POC: NEGATIVE
LEUKOCYTE EST, POC: ABNORMAL
LYMPHOCYTES ABSOLUTE: 0.8 K/CU MM
LYMPHOCYTES RELATIVE PERCENT: 16.1 % (ref 24–44)
MCH RBC QN AUTO: 31.1 PG (ref 27–31)
MCHC RBC AUTO-ENTMCNC: 32.1 % (ref 32–36)
MCV RBC AUTO: 97 FL (ref 78–100)
MONOCYTES ABSOLUTE: 0.6 K/CU MM
MONOCYTES RELATIVE PERCENT: 11 % (ref 0–4)
NITRITE, POC: POSITIVE
NUCLEATED RBC %: 0 %
PDW BLD-RTO: 12.3 % (ref 11.7–14.9)
PH, POC: 5.5
PLATELET # BLD: 210 K/CU MM (ref 140–440)
PMV BLD AUTO: 11.1 FL (ref 7.5–11.1)
POTASSIUM SERPL-SCNC: 4.6 MMOL/L (ref 3.5–5.1)
PROTEIN, POC: NEGATIVE
RBC # BLD: 4.02 M/CU MM (ref 4.2–5.4)
SEGMENTED NEUTROPHILS ABSOLUTE COUNT: 3.6 K/CU MM
SEGMENTED NEUTROPHILS RELATIVE PERCENT: 70 % (ref 36–66)
SODIUM BLD-SCNC: 140 MMOL/L (ref 135–145)
SPECIFIC GRAVITY, POC: 1.02
TOTAL IMMATURE NEUTOROPHIL: 0.02 K/CU MM
TOTAL NUCLEATED RBC: 0 K/CU MM
TOTAL PROTEIN: 6 GM/DL (ref 6.4–8.2)
UROBILINOGEN, POC: ABNORMAL
WBC # BLD: 5.2 K/CU MM (ref 4–10.5)

## 2019-05-28 PROCEDURE — 1036F TOBACCO NON-USER: CPT | Performed by: NURSE PRACTITIONER

## 2019-05-28 PROCEDURE — G8427 DOCREV CUR MEDS BY ELIG CLIN: HCPCS | Performed by: NURSE PRACTITIONER

## 2019-05-28 PROCEDURE — G8417 CALC BMI ABV UP PARAM F/U: HCPCS | Performed by: NURSE PRACTITIONER

## 2019-05-28 PROCEDURE — 36415 COLL VENOUS BLD VENIPUNCTURE: CPT

## 2019-05-28 PROCEDURE — 85025 COMPLETE CBC W/AUTO DIFF WBC: CPT

## 2019-05-28 PROCEDURE — 1123F ACP DISCUSS/DSCN MKR DOCD: CPT | Performed by: NURSE PRACTITIONER

## 2019-05-28 PROCEDURE — 74176 CT ABD & PELVIS W/O CONTRAST: CPT

## 2019-05-28 PROCEDURE — 80053 COMPREHEN METABOLIC PANEL: CPT

## 2019-05-28 PROCEDURE — 87086 URINE CULTURE/COLONY COUNT: CPT

## 2019-05-28 PROCEDURE — G8399 PT W/DXA RESULTS DOCUMENT: HCPCS | Performed by: NURSE PRACTITIONER

## 2019-05-28 PROCEDURE — 87077 CULTURE AEROBIC IDENTIFY: CPT

## 2019-05-28 PROCEDURE — 81002 URINALYSIS NONAUTO W/O SCOPE: CPT | Performed by: NURSE PRACTITIONER

## 2019-05-28 PROCEDURE — 1090F PRES/ABSN URINE INCON ASSESS: CPT | Performed by: NURSE PRACTITIONER

## 2019-05-28 PROCEDURE — 99213 OFFICE O/P EST LOW 20 MIN: CPT | Performed by: NURSE PRACTITIONER

## 2019-05-28 PROCEDURE — 4040F PNEUMOC VAC/ADMIN/RCVD: CPT | Performed by: NURSE PRACTITIONER

## 2019-05-28 PROCEDURE — 87186 SC STD MICRODIL/AGAR DIL: CPT

## 2019-05-28 PROCEDURE — 3017F COLORECTAL CA SCREEN DOC REV: CPT | Performed by: NURSE PRACTITIONER

## 2019-05-28 RX ORDER — CIPROFLOXACIN 500 MG/1
500 TABLET, FILM COATED ORAL 2 TIMES DAILY
Qty: 14 TABLET | Refills: 0 | Status: SHIPPED | OUTPATIENT
Start: 2019-05-28 | End: 2019-06-04

## 2019-05-28 RX ORDER — METRONIDAZOLE 500 MG/1
500 TABLET ORAL 3 TIMES DAILY
Qty: 21 TABLET | Refills: 0 | Status: SHIPPED | OUTPATIENT
Start: 2019-05-28 | End: 2019-05-30 | Stop reason: ALTCHOICE

## 2019-05-28 RX ORDER — ESOMEPRAZOLE MAGNESIUM 20 MG/1
20 FOR SUSPENSION ORAL DAILY
COMMUNITY
End: 2021-02-26

## 2019-05-28 ASSESSMENT — ENCOUNTER SYMPTOMS
APNEA: 0
SINUS PRESSURE: 0
BLOOD IN STOOL: 0
CONSTIPATION: 0
SHORTNESS OF BREATH: 0
DIARRHEA: 0
ANAL BLEEDING: 0
EYES NEGATIVE: 1
COUGH: 0
COLOR CHANGE: 0
SINUS PAIN: 0
ALLERGIC/IMMUNOLOGIC NEGATIVE: 1
CHEST TIGHTNESS: 0
VOMITING: 0
NAUSEA: 0
ABDOMINAL PAIN: 1

## 2019-05-28 NOTE — PROGRESS NOTES
Masoud Rose  1945 05/28/19    Chief Complaint   Patient presents with    Abdominal Cramping     pt denies any diarrhea sx started 2 days ago        SUBJECTIVE:      Patient presents to the office this morning for generalized abdominal cramping, worse in the LLQ. States she did eat some peanuts over the weekend which she normally does not do. Denies any nausea, emesis, or diarrhea. States she woke up Sunday covered in sweat and felt feverish and clammy. Has not measured any objective fevers, however. Bowel movements have been normal without any blood or other discoloration. Denies any burning with urination or other urinary issues. Patient is with history of diverticulitis in the past and states her symptoms do feel mildly similar. Review of Systems   Constitutional: Positive for fatigue and fever. Negative for activity change and appetite change. HENT: Negative for congestion, nosebleeds, sinus pressure and sinus pain. Eyes: Negative. Respiratory: Negative for apnea, cough, chest tightness and shortness of breath. Cardiovascular: Negative for chest pain and palpitations. Gastrointestinal: Positive for abdominal pain. Negative for anal bleeding, blood in stool, constipation, diarrhea, nausea and vomiting. Endocrine: Negative. Genitourinary: Negative for difficulty urinating, flank pain and hematuria. Musculoskeletal: Negative for arthralgias, joint swelling and myalgias. Skin: Negative for color change and rash. Allergic/Immunologic: Negative. Neurological: Negative for dizziness, light-headedness and headaches. Psychiatric/Behavioral: Negative. Negative for behavioral problems. OBJECTIVE:    /71   Pulse 88   Resp 20   Ht 5' (1.524 m)   Wt 144 lb 9.6 oz (65.6 kg)   LMP  (LMP Unknown)   SpO2 97%   BMI 28.24 kg/m²     Physical Exam   Constitutional: She is oriented to person, place, and time. She appears well-developed and well-nourished. No distress. HENT:   Head: Normocephalic and atraumatic. Nose: Nose normal.   Mouth/Throat: No oropharyngeal exudate. Eyes: Pupils are equal, round, and reactive to light. Conjunctivae and EOM are normal.   Neck: Normal range of motion. Neck supple. No muscular tenderness present. No edema and no erythema present. Cardiovascular: Normal rate, regular rhythm and normal heart sounds. Exam reveals no friction rub. No murmur heard. Pulmonary/Chest: Effort normal and breath sounds normal. No respiratory distress. Abdominal: Soft. Bowel sounds are normal. There is tenderness (generalized, greatest in LLQ). Musculoskeletal: Normal range of motion. She exhibits no edema. Neurological: She is alert and oriented to person, place, and time. She displays normal reflexes. No cranial nerve deficit. Coordination normal.   Skin: Skin is warm and dry. Capillary refill takes less than 2 seconds. No rash noted. She is not diaphoretic. No erythema. Psychiatric: She has a normal mood and affect. Her behavior is normal. Judgment and thought content normal.       ASSESSMENT:    1. Generalized abdominal pain        PLAN:    Mariah Coates was seen today for abdominal cramping. Diagnoses and all orders for this visit:    Generalized abdominal pain- exact etiology unclear. UA in office nitrite positive today. However, given pain greatest in LLQ and symptoms similar to previous diverticulitis flare, will Rx as below. CT ordered today to confirm dx and rule out complicated diverticulitis/abscess. F/u with Dr Brit Germain in 1 week. If CT negative, will continue Cipro and hold Flagyl.   -     ciprofloxacin (CIPRO) 500 MG tablet; Take 1 tablet by mouth 2 times daily for 7 days  -     metroNIDAZOLE (FLAGYL) 500 MG tablet; Take 1 tablet by mouth 3 times daily for 7 days  -     CBC Auto Differential; Future  -     Comprehensive Metabolic Panel;  Future  -     POCT Urinalysis no Micro  -     CT ABDOMEN PELVIS WO CONTRAST Additional Contrast? None; Future    RX Monitoring 5/19/2017   Attestation The Prescription Monitoring Report for this patient was reviewed today. Chronic Pain Routine Monitoring Possible medication side effects, risk of tolerance and/or dependence, and alternative treatments discussed; No signs of potential drug abuse or diversion identified. Course of treatment, including any medications, possible imaging, referrals, and follow ups discussed with patient. All risks and benefits and possible side effects discussed with patient who agrees to plan of care and verbalizes understanding. All labs and imaging reviewed. Return if symptoms worsen or fail to improve.

## 2019-05-29 ENCOUNTER — TELEPHONE (OUTPATIENT)
Dept: INTERNAL MEDICINE CLINIC | Age: 74
End: 2019-05-29

## 2019-05-30 ENCOUNTER — TELEPHONE (OUTPATIENT)
Dept: INTERNAL MEDICINE CLINIC | Age: 74
End: 2019-05-30

## 2019-05-30 DIAGNOSIS — K57.92 DIVERTICULITIS: Primary | ICD-10-CM

## 2019-05-30 DIAGNOSIS — R11.0 NAUSEA: ICD-10-CM

## 2019-05-30 RX ORDER — ONDANSETRON 4 MG/1
4 TABLET, FILM COATED ORAL 3 TIMES DAILY PRN
Qty: 30 TABLET | Refills: 0 | Status: SHIPPED | OUTPATIENT
Start: 2019-05-30 | End: 2019-06-06

## 2019-05-30 RX ORDER — AMOXICILLIN AND CLAVULANATE POTASSIUM 875; 125 MG/1; MG/1
1 TABLET, FILM COATED ORAL 2 TIMES DAILY
Qty: 14 TABLET | Refills: 0 | Status: SHIPPED | OUTPATIENT
Start: 2019-05-30 | End: 2019-06-06

## 2019-05-30 NOTE — TELEPHONE ENCOUNTER
Nausea is likely d/t Flagyl. Would recommend switching her ATB over to Augmentin and I have sent this in, along with some Zofran in the mean time for nausea. Would recommend to take 1-2 more days of the Cipro is able to to assure UTI is treated.

## 2019-05-31 LAB
CULTURE: ABNORMAL
Lab: ABNORMAL
SPECIMEN: ABNORMAL
TOTAL COLONY COUNT: ABNORMAL

## 2019-06-06 ENCOUNTER — OFFICE VISIT (OUTPATIENT)
Dept: INTERNAL MEDICINE CLINIC | Age: 74
End: 2019-06-06
Payer: MEDICARE

## 2019-06-06 VITALS
OXYGEN SATURATION: 94 % | HEART RATE: 92 BPM | WEIGHT: 143 LBS | DIASTOLIC BLOOD PRESSURE: 68 MMHG | BODY MASS INDEX: 27.93 KG/M2 | SYSTOLIC BLOOD PRESSURE: 118 MMHG

## 2019-06-06 DIAGNOSIS — I10 ESSENTIAL HYPERTENSION: ICD-10-CM

## 2019-06-06 DIAGNOSIS — K57.92 ACUTE DIVERTICULITIS: Primary | ICD-10-CM

## 2019-06-06 DIAGNOSIS — N18.30 STAGE 3 CHRONIC KIDNEY DISEASE (HCC): ICD-10-CM

## 2019-06-06 DIAGNOSIS — N30.90 CYSTITIS: ICD-10-CM

## 2019-06-06 PROCEDURE — G8417 CALC BMI ABV UP PARAM F/U: HCPCS | Performed by: INTERNAL MEDICINE

## 2019-06-06 PROCEDURE — 4040F PNEUMOC VAC/ADMIN/RCVD: CPT | Performed by: INTERNAL MEDICINE

## 2019-06-06 PROCEDURE — 1036F TOBACCO NON-USER: CPT | Performed by: INTERNAL MEDICINE

## 2019-06-06 PROCEDURE — G8399 PT W/DXA RESULTS DOCUMENT: HCPCS | Performed by: INTERNAL MEDICINE

## 2019-06-06 PROCEDURE — 1090F PRES/ABSN URINE INCON ASSESS: CPT | Performed by: INTERNAL MEDICINE

## 2019-06-06 PROCEDURE — G8427 DOCREV CUR MEDS BY ELIG CLIN: HCPCS | Performed by: INTERNAL MEDICINE

## 2019-06-06 PROCEDURE — 1123F ACP DISCUSS/DSCN MKR DOCD: CPT | Performed by: INTERNAL MEDICINE

## 2019-06-06 PROCEDURE — 99214 OFFICE O/P EST MOD 30 MIN: CPT | Performed by: INTERNAL MEDICINE

## 2019-06-06 PROCEDURE — 3017F COLORECTAL CA SCREEN DOC REV: CPT | Performed by: INTERNAL MEDICINE

## 2019-06-06 NOTE — PROGRESS NOTES
Constanza Sheth  1945 06/06/19    SUBJECTIVE:    Seen by [facility] Nurse CNP last week and dx'd w UTI as well as diverticulitis. Initially started cipro and flagyl w nausea, switched to augmentin and now better- had total of abx for 4d. Had diarrhea now resolved. Appetite better now and no fever, chills, abd pain nearly resolved fr 10/10 now to 1-2/10. For cystitis denies any dysuria or pelvic pain. HTN- bp stable, has hx of CKD also and creat 1.3 last mo    Denies sx cp or sob. OBJECTIVE:    /68 (Site: Left Upper Arm, Position: Sitting, Cuff Size: Medium Adult)   Pulse 92   Wt 143 lb (64.9 kg)   LMP  (LMP Unknown)   SpO2 94%   BMI 27.93 kg/m²     Physical Exam   Constitutional: She appears well-developed and well-nourished. No distress. HENT:   Head: Normocephalic and atraumatic. Nose: Nose normal.   Mouth/Throat: Oropharynx is clear and moist. No oropharyngeal exudate. Eyes: Pupils are equal, round, and reactive to light. Conjunctivae and EOM are normal. Right eye exhibits no discharge. Left eye exhibits no discharge. No scleral icterus. Neck: Neck supple. No tracheal deviation present. Cardiovascular: Normal rate, regular rhythm, normal heart sounds and intact distal pulses. Exam reveals no gallop and no friction rub. No murmur heard. Pulmonary/Chest: Effort normal and breath sounds normal. No respiratory distress. She has no wheezes. She has no rales. Abdominal: Soft. Bowel sounds are normal. She exhibits no distension and no mass. There is tenderness (very mild/minimal LLQ). There is no rebound and no guarding. Musculoskeletal: She exhibits no edema. Lymphadenopathy:     She has no cervical adenopathy. Neurological: She is alert. She has normal reflexes. Skin: Skin is warm and dry. Psychiatric: She has a normal mood and affect. Vitals reviewed. ASSESSMENT:    1. Acute diverticulitis    2. Cystitis    3. Stage 3 chronic kidney disease ([facility] [address]    4. Essential hypertension        PLAN:    Glo iRdley was seen today for follow-up. Diagnoses and all orders for this visit:    Acute diverticulitis - improving w therapy and now abx completed, flagyl now on allergy list.  F/u lab. Adv diet as liliana.  -     BASIC METABOLIC PANEL; Future    Cystitis - also recheck ua to see if cleared  -     Urinalysis; Future    Stage 3 chronic kidney disease ([facility] [address]- to push fluids and f/u renal fxn  -     BASIC METABOLIC PANEL; Future    Essential hypertension- The current medical regimen is effective;  continue present plan and medications. Is on diuretic therapy. -     BASIC METABOLIC PANEL;  Future

## 2019-06-07 LAB
BILIRUBIN URINE: NEGATIVE
BLOOD, URINE: NEGATIVE
CLARITY: CLEAR
COLOR: YELLOW
GLUCOSE URINE: NEGATIVE MG/DL
KETONES, URINE: NEGATIVE MG/DL
LEUKOCYTE ESTERASE, URINE: NEGATIVE
MICROSCOPIC EXAMINATION: NORMAL
NITRITE, URINE: NEGATIVE
PH UA: 7 (ref 5–8)
PROTEIN UA: NEGATIVE MG/DL
SPECIFIC GRAVITY UA: 1.02 (ref 1–1.03)
URINE TYPE: NORMAL
UROBILINOGEN, URINE: 0.2 E.U./DL

## 2019-06-10 DIAGNOSIS — F41.9 ANXIETY: ICD-10-CM

## 2019-06-10 RX ORDER — ESCITALOPRAM OXALATE 10 MG/1
10 TABLET ORAL EVERY MORNING
Qty: 90 TABLET | Refills: 1 | Status: SHIPPED | OUTPATIENT
Start: 2019-06-10 | End: 2020-03-31 | Stop reason: SDUPTHER

## 2019-06-17 ENCOUNTER — HOSPITAL ENCOUNTER (OUTPATIENT)
Age: 74
Discharge: HOME | End: 2019-06-17
Payer: MEDICARE

## 2019-06-17 DIAGNOSIS — N18.30 STAGE 3 CHRONIC KIDNEY DISEASE (HCC): ICD-10-CM

## 2019-06-17 DIAGNOSIS — K57.92 ACUTE DIVERTICULITIS: ICD-10-CM

## 2019-06-17 DIAGNOSIS — I10 ESSENTIAL HYPERTENSION: ICD-10-CM

## 2019-06-17 LAB
ANION GAP SERPL CALCULATED.3IONS-SCNC: 11 MMOL/L (ref 4–16)
BUN BLDV-MCNC: 21 MG/DL (ref 6–23)
CALCIUM SERPL-MCNC: 9.3 MG/DL (ref 8.3–10.6)
CHLORIDE BLD-SCNC: 101 MMOL/L (ref 99–110)
CO2: 27 MMOL/L (ref 21–32)
CREAT SERPL-MCNC: 1.2 MG/DL (ref 0.6–1.1)
GFR AFRICAN AMERICAN: 53 ML/MIN/1.73M2
GFR NON-AFRICAN AMERICAN: 44 ML/MIN/1.73M2
GLUCOSE BLD-MCNC: 81 MG/DL (ref 70–99)
POTASSIUM SERPL-SCNC: 4.1 MMOL/L (ref 3.5–5.1)
SODIUM BLD-SCNC: 139 MMOL/L (ref 135–145)

## 2019-06-17 PROCEDURE — 36415 COLL VENOUS BLD VENIPUNCTURE: CPT

## 2019-06-17 PROCEDURE — 80048 BASIC METABOLIC PNL TOTAL CA: CPT

## 2019-10-04 ENCOUNTER — OFFICE VISIT (OUTPATIENT)
Dept: INTERNAL MEDICINE CLINIC | Age: 74
End: 2019-10-04
Payer: MEDICARE

## 2019-10-04 VITALS
SYSTOLIC BLOOD PRESSURE: 126 MMHG | HEART RATE: 83 BPM | DIASTOLIC BLOOD PRESSURE: 68 MMHG | OXYGEN SATURATION: 95 % | WEIGHT: 139 LBS | RESPIRATION RATE: 16 BRPM | BODY MASS INDEX: 27.15 KG/M2

## 2019-10-04 DIAGNOSIS — R05.9 COUGH: ICD-10-CM

## 2019-10-04 DIAGNOSIS — Z23 NEED FOR INFLUENZA VACCINATION: ICD-10-CM

## 2019-10-04 DIAGNOSIS — M54.16 LEFT LUMBAR RADICULOPATHY: ICD-10-CM

## 2019-10-04 DIAGNOSIS — J01.00 ACUTE NON-RECURRENT MAXILLARY SINUSITIS: Primary | ICD-10-CM

## 2019-10-04 PROCEDURE — 90662 IIV NO PRSV INCREASED AG IM: CPT | Performed by: INTERNAL MEDICINE

## 2019-10-04 PROCEDURE — G0008 ADMIN INFLUENZA VIRUS VAC: HCPCS | Performed by: INTERNAL MEDICINE

## 2019-10-04 PROCEDURE — G8482 FLU IMMUNIZE ORDER/ADMIN: HCPCS | Performed by: INTERNAL MEDICINE

## 2019-10-04 PROCEDURE — 1090F PRES/ABSN URINE INCON ASSESS: CPT | Performed by: INTERNAL MEDICINE

## 2019-10-04 PROCEDURE — G8427 DOCREV CUR MEDS BY ELIG CLIN: HCPCS | Performed by: INTERNAL MEDICINE

## 2019-10-04 PROCEDURE — G8399 PT W/DXA RESULTS DOCUMENT: HCPCS | Performed by: INTERNAL MEDICINE

## 2019-10-04 PROCEDURE — 99213 OFFICE O/P EST LOW 20 MIN: CPT | Performed by: INTERNAL MEDICINE

## 2019-10-04 PROCEDURE — 3017F COLORECTAL CA SCREEN DOC REV: CPT | Performed by: INTERNAL MEDICINE

## 2019-10-04 PROCEDURE — 4040F PNEUMOC VAC/ADMIN/RCVD: CPT | Performed by: INTERNAL MEDICINE

## 2019-10-04 PROCEDURE — 1036F TOBACCO NON-USER: CPT | Performed by: INTERNAL MEDICINE

## 2019-10-04 PROCEDURE — 1123F ACP DISCUSS/DSCN MKR DOCD: CPT | Performed by: INTERNAL MEDICINE

## 2019-10-04 PROCEDURE — G8417 CALC BMI ABV UP PARAM F/U: HCPCS | Performed by: INTERNAL MEDICINE

## 2019-10-04 RX ORDER — PREDNISONE 1 MG/1
TABLET ORAL
Qty: 21 TABLET | Refills: 0 | Status: SHIPPED | OUTPATIENT
Start: 2019-10-04 | End: 2019-11-01 | Stop reason: ALTCHOICE

## 2019-10-04 RX ORDER — CELECOXIB 200 MG/1
200 CAPSULE ORAL EVERY MORNING
Qty: 90 CAPSULE | Refills: 1 | Status: SHIPPED | OUTPATIENT
Start: 2019-10-04 | End: 2020-05-12 | Stop reason: SDUPTHER

## 2019-10-04 RX ORDER — LEVOFLOXACIN 250 MG/1
250 TABLET ORAL DAILY
Qty: 10 TABLET | Refills: 0 | Status: SHIPPED | OUTPATIENT
Start: 2019-10-04 | End: 2019-10-14

## 2019-10-18 ENCOUNTER — HOSPITAL ENCOUNTER (OUTPATIENT)
Age: 74
Discharge: HOME | End: 2019-10-18
Payer: MEDICARE

## 2019-10-18 ENCOUNTER — HOSPITAL ENCOUNTER (OUTPATIENT)
Dept: GENERAL RADIOLOGY | Age: 74
Discharge: HOME | End: 2019-10-18
Payer: MEDICARE

## 2019-10-18 DIAGNOSIS — R05.9 COUGH: ICD-10-CM

## 2019-10-18 PROCEDURE — 71046 X-RAY EXAM CHEST 2 VIEWS: CPT

## 2019-11-01 ENCOUNTER — OFFICE VISIT (OUTPATIENT)
Dept: INTERNAL MEDICINE CLINIC | Age: 74
End: 2019-11-01
Payer: MEDICARE

## 2019-11-01 VITALS
DIASTOLIC BLOOD PRESSURE: 80 MMHG | SYSTOLIC BLOOD PRESSURE: 140 MMHG | OXYGEN SATURATION: 98 % | RESPIRATION RATE: 14 BRPM | HEART RATE: 68 BPM

## 2019-11-01 DIAGNOSIS — M54.50 ACUTE RIGHT-SIDED LOW BACK PAIN WITHOUT SCIATICA: ICD-10-CM

## 2019-11-01 DIAGNOSIS — R10.9 FLANK PAIN: ICD-10-CM

## 2019-11-01 DIAGNOSIS — K57.92 ACUTE DIVERTICULITIS: Primary | ICD-10-CM

## 2019-11-01 LAB
BILIRUBIN, POC: NORMAL
BLOOD URINE, POC: NORMAL
CLARITY, POC: CLEAR
COLOR, POC: NORMAL
GLUCOSE URINE, POC: NORMAL
KETONES, POC: NORMAL
LEUKOCYTE EST, POC: NORMAL
NITRITE, POC: NORMAL
PH, POC: 6
PROTEIN, POC: NORMAL
SPECIFIC GRAVITY, POC: 1.02
UROBILINOGEN, POC: 0.2

## 2019-11-01 PROCEDURE — 81002 URINALYSIS NONAUTO W/O SCOPE: CPT | Performed by: INTERNAL MEDICINE

## 2019-11-01 PROCEDURE — G8427 DOCREV CUR MEDS BY ELIG CLIN: HCPCS | Performed by: INTERNAL MEDICINE

## 2019-11-01 PROCEDURE — 1123F ACP DISCUSS/DSCN MKR DOCD: CPT | Performed by: INTERNAL MEDICINE

## 2019-11-01 PROCEDURE — 1036F TOBACCO NON-USER: CPT | Performed by: INTERNAL MEDICINE

## 2019-11-01 PROCEDURE — 1090F PRES/ABSN URINE INCON ASSESS: CPT | Performed by: INTERNAL MEDICINE

## 2019-11-01 PROCEDURE — G8482 FLU IMMUNIZE ORDER/ADMIN: HCPCS | Performed by: INTERNAL MEDICINE

## 2019-11-01 PROCEDURE — 99213 OFFICE O/P EST LOW 20 MIN: CPT | Performed by: INTERNAL MEDICINE

## 2019-11-01 PROCEDURE — 4040F PNEUMOC VAC/ADMIN/RCVD: CPT | Performed by: INTERNAL MEDICINE

## 2019-11-01 PROCEDURE — G8417 CALC BMI ABV UP PARAM F/U: HCPCS | Performed by: INTERNAL MEDICINE

## 2019-11-01 PROCEDURE — G8399 PT W/DXA RESULTS DOCUMENT: HCPCS | Performed by: INTERNAL MEDICINE

## 2019-11-01 PROCEDURE — 3017F COLORECTAL CA SCREEN DOC REV: CPT | Performed by: INTERNAL MEDICINE

## 2019-11-01 RX ORDER — AMOXICILLIN AND CLAVULANATE POTASSIUM 875; 125 MG/1; MG/1
1 TABLET, FILM COATED ORAL 2 TIMES DAILY
Qty: 20 TABLET | Refills: 0 | Status: SHIPPED | OUTPATIENT
Start: 2019-11-01 | End: 2019-11-07 | Stop reason: SDUPTHER

## 2019-11-01 RX ORDER — HYDROCODONE BITARTRATE AND ACETAMINOPHEN 5; 325 MG/1; MG/1
1 TABLET ORAL EVERY 8 HOURS PRN
Qty: 10 TABLET | Refills: 0 | Status: SHIPPED | OUTPATIENT
Start: 2019-11-01 | End: 2019-11-08

## 2019-11-04 ENCOUNTER — TELEPHONE (OUTPATIENT)
Dept: INTERNAL MEDICINE CLINIC | Age: 74
End: 2019-11-04

## 2019-11-04 DIAGNOSIS — K57.92 ACUTE DIVERTICULITIS: Primary | ICD-10-CM

## 2019-11-05 ENCOUNTER — HOSPITAL ENCOUNTER (OUTPATIENT)
Dept: CT IMAGING | Age: 74
Discharge: HOME | End: 2019-11-05
Payer: MEDICARE

## 2019-11-05 DIAGNOSIS — K57.92 ACUTE DIVERTICULITIS: ICD-10-CM

## 2019-11-05 LAB
GFR AFRICAN AMERICAN: 60 ML/MIN/1.73M2
GFR NON-AFRICAN AMERICAN: 49 ML/MIN/1.73M2
POC CREATININE: 1.1 MG/DL (ref 0.6–1.1)

## 2019-11-05 PROCEDURE — 74177 CT ABD & PELVIS W/CONTRAST: CPT

## 2019-11-05 PROCEDURE — 6360000004 HC RX CONTRAST MEDICATION: Performed by: INTERNAL MEDICINE

## 2019-11-05 RX ADMIN — IOPAMIDOL 75 ML: 755 INJECTION, SOLUTION INTRAVENOUS at 09:36

## 2019-11-05 RX ADMIN — IOHEXOL 50 ML: 240 INJECTION, SOLUTION INTRATHECAL; INTRAVASCULAR; INTRAVENOUS; ORAL at 09:36

## 2019-11-07 ENCOUNTER — OFFICE VISIT (OUTPATIENT)
Dept: INTERNAL MEDICINE CLINIC | Age: 74
End: 2019-11-07
Payer: MEDICARE

## 2019-11-07 VITALS
SYSTOLIC BLOOD PRESSURE: 108 MMHG | OXYGEN SATURATION: 96 % | DIASTOLIC BLOOD PRESSURE: 64 MMHG | RESPIRATION RATE: 14 BRPM | HEART RATE: 87 BPM

## 2019-11-07 DIAGNOSIS — K57.92 ACUTE DIVERTICULITIS: Primary | ICD-10-CM

## 2019-11-07 PROCEDURE — G8417 CALC BMI ABV UP PARAM F/U: HCPCS | Performed by: INTERNAL MEDICINE

## 2019-11-07 PROCEDURE — 1090F PRES/ABSN URINE INCON ASSESS: CPT | Performed by: INTERNAL MEDICINE

## 2019-11-07 PROCEDURE — 3017F COLORECTAL CA SCREEN DOC REV: CPT | Performed by: INTERNAL MEDICINE

## 2019-11-07 PROCEDURE — 99213 OFFICE O/P EST LOW 20 MIN: CPT | Performed by: INTERNAL MEDICINE

## 2019-11-07 PROCEDURE — 1036F TOBACCO NON-USER: CPT | Performed by: INTERNAL MEDICINE

## 2019-11-07 PROCEDURE — G8482 FLU IMMUNIZE ORDER/ADMIN: HCPCS | Performed by: INTERNAL MEDICINE

## 2019-11-07 PROCEDURE — G8399 PT W/DXA RESULTS DOCUMENT: HCPCS | Performed by: INTERNAL MEDICINE

## 2019-11-07 PROCEDURE — G8427 DOCREV CUR MEDS BY ELIG CLIN: HCPCS | Performed by: INTERNAL MEDICINE

## 2019-11-07 PROCEDURE — 4040F PNEUMOC VAC/ADMIN/RCVD: CPT | Performed by: INTERNAL MEDICINE

## 2019-11-07 PROCEDURE — 1123F ACP DISCUSS/DSCN MKR DOCD: CPT | Performed by: INTERNAL MEDICINE

## 2019-11-07 RX ORDER — AMOXICILLIN AND CLAVULANATE POTASSIUM 875; 125 MG/1; MG/1
1 TABLET, FILM COATED ORAL 2 TIMES DAILY
Qty: 20 TABLET | Refills: 0 | Status: SHIPPED | OUTPATIENT
Start: 2019-11-07 | End: 2019-11-17

## 2019-12-27 RX ORDER — HYDROCHLOROTHIAZIDE 12.5 MG/1
12.5 CAPSULE, GELATIN COATED ORAL EVERY MORNING
Qty: 90 CAPSULE | Refills: 1 | Status: SHIPPED | OUTPATIENT
Start: 2019-12-27 | End: 2020-05-12 | Stop reason: SDUPTHER

## 2020-03-31 RX ORDER — ESCITALOPRAM OXALATE 10 MG/1
10 TABLET ORAL EVERY MORNING
Qty: 90 TABLET | Refills: 0 | Status: SHIPPED | OUTPATIENT
Start: 2020-03-31 | End: 2020-05-12 | Stop reason: SDUPTHER

## 2020-05-12 ENCOUNTER — HOSPITAL ENCOUNTER (OUTPATIENT)
Age: 75
Discharge: HOME | End: 2020-05-12
Payer: MEDICARE

## 2020-05-12 ENCOUNTER — HOSPITAL ENCOUNTER (OUTPATIENT)
Dept: GENERAL RADIOLOGY | Age: 75
Discharge: HOME | End: 2020-05-12
Payer: MEDICARE

## 2020-05-12 ENCOUNTER — OFFICE VISIT (OUTPATIENT)
Dept: INTERNAL MEDICINE CLINIC | Age: 75
End: 2020-05-12
Payer: MEDICARE

## 2020-05-12 VITALS
SYSTOLIC BLOOD PRESSURE: 118 MMHG | WEIGHT: 132 LBS | RESPIRATION RATE: 15 BRPM | DIASTOLIC BLOOD PRESSURE: 68 MMHG | OXYGEN SATURATION: 95 % | HEART RATE: 94 BPM | BODY MASS INDEX: 25.78 KG/M2

## 2020-05-12 PROBLEM — M65.331 TRIGGER MIDDLE FINGER OF RIGHT HAND: Status: ACTIVE | Noted: 2020-05-12

## 2020-05-12 PROBLEM — L29.9 PRURITIC CONDITION: Status: ACTIVE | Noted: 2020-05-12

## 2020-05-12 LAB
ALBUMIN SERPL-MCNC: 4.4 GM/DL (ref 3.4–5)
ALP BLD-CCNC: 104 IU/L (ref 40–128)
ALT SERPL-CCNC: 14 U/L (ref 10–40)
ANION GAP SERPL CALCULATED.3IONS-SCNC: 12 MMOL/L (ref 4–16)
AST SERPL-CCNC: 17 IU/L (ref 15–37)
BASOPHILS ABSOLUTE: 0.1 K/CU MM
BASOPHILS RELATIVE PERCENT: 0.9 % (ref 0–1)
BILIRUB SERPL-MCNC: 0.5 MG/DL (ref 0–1)
BUN BLDV-MCNC: 21 MG/DL (ref 6–23)
C-REACTIVE PROTEIN, HIGH SENSITIVITY: 1.9 MG/L
CALCIUM SERPL-MCNC: 9.6 MG/DL (ref 8.3–10.6)
CHLORIDE BLD-SCNC: 103 MMOL/L (ref 99–110)
CO2: 26 MMOL/L (ref 21–32)
CREAT SERPL-MCNC: 1.3 MG/DL (ref 0.6–1.1)
DIFFERENTIAL TYPE: ABNORMAL
EOSINOPHILS ABSOLUTE: 0.1 K/CU MM
EOSINOPHILS RELATIVE PERCENT: 2.1 % (ref 0–3)
ERYTHROCYTE SEDIMENTATION RATE: 29 MM/HR (ref 0–30)
GFR AFRICAN AMERICAN: 48 ML/MIN/1.73M2
GFR NON-AFRICAN AMERICAN: 40 ML/MIN/1.73M2
GLUCOSE BLD-MCNC: 90 MG/DL (ref 70–99)
HCT VFR BLD CALC: 38.7 % (ref 37–47)
HEMOGLOBIN: 12.6 GM/DL (ref 12.5–16)
IMMATURE NEUTROPHIL %: 0.3 % (ref 0–0.43)
LYMPHOCYTES ABSOLUTE: 0.9 K/CU MM
LYMPHOCYTES RELATIVE PERCENT: 15.3 % (ref 24–44)
MCH RBC QN AUTO: 30.6 PG (ref 27–31)
MCHC RBC AUTO-ENTMCNC: 32.6 % (ref 32–36)
MCV RBC AUTO: 93.9 FL (ref 78–100)
MONOCYTES ABSOLUTE: 0.6 K/CU MM
MONOCYTES RELATIVE PERCENT: 10.5 % (ref 0–4)
NUCLEATED RBC %: 0 %
PDW BLD-RTO: 12.8 % (ref 11.7–14.9)
PLATELET # BLD: 201 K/CU MM (ref 140–440)
PMV BLD AUTO: 11.1 FL (ref 7.5–11.1)
POTASSIUM SERPL-SCNC: 4.3 MMOL/L (ref 3.5–5.1)
RBC # BLD: 4.12 M/CU MM (ref 4.2–5.4)
SEGMENTED NEUTROPHILS ABSOLUTE COUNT: 4.1 K/CU MM
SEGMENTED NEUTROPHILS RELATIVE PERCENT: 70.9 % (ref 36–66)
SODIUM BLD-SCNC: 141 MMOL/L (ref 135–145)
TOTAL IMMATURE NEUTOROPHIL: 0.02 K/CU MM
TOTAL NUCLEATED RBC: 0 K/CU MM
TOTAL PROTEIN: 6.5 GM/DL (ref 6.4–8.2)
TSH HIGH SENSITIVITY: 1.17 UIU/ML (ref 0.27–4.2)
WBC # BLD: 5.8 K/CU MM (ref 4–10.5)

## 2020-05-12 PROCEDURE — 85652 RBC SED RATE AUTOMATED: CPT

## 2020-05-12 PROCEDURE — 1123F ACP DISCUSS/DSCN MKR DOCD: CPT | Performed by: INTERNAL MEDICINE

## 2020-05-12 PROCEDURE — G8427 DOCREV CUR MEDS BY ELIG CLIN: HCPCS | Performed by: INTERNAL MEDICINE

## 2020-05-12 PROCEDURE — 1090F PRES/ABSN URINE INCON ASSESS: CPT | Performed by: INTERNAL MEDICINE

## 2020-05-12 PROCEDURE — 84443 ASSAY THYROID STIM HORMONE: CPT

## 2020-05-12 PROCEDURE — G8399 PT W/DXA RESULTS DOCUMENT: HCPCS | Performed by: INTERNAL MEDICINE

## 2020-05-12 PROCEDURE — 1036F TOBACCO NON-USER: CPT | Performed by: INTERNAL MEDICINE

## 2020-05-12 PROCEDURE — 36415 COLL VENOUS BLD VENIPUNCTURE: CPT

## 2020-05-12 PROCEDURE — 86140 C-REACTIVE PROTEIN: CPT

## 2020-05-12 PROCEDURE — 85025 COMPLETE CBC W/AUTO DIFF WBC: CPT

## 2020-05-12 PROCEDURE — 80053 COMPREHEN METABOLIC PANEL: CPT

## 2020-05-12 PROCEDURE — 4040F PNEUMOC VAC/ADMIN/RCVD: CPT | Performed by: INTERNAL MEDICINE

## 2020-05-12 PROCEDURE — 71046 X-RAY EXAM CHEST 2 VIEWS: CPT

## 2020-05-12 PROCEDURE — 99214 OFFICE O/P EST MOD 30 MIN: CPT | Performed by: INTERNAL MEDICINE

## 2020-05-12 PROCEDURE — G8417 CALC BMI ABV UP PARAM F/U: HCPCS | Performed by: INTERNAL MEDICINE

## 2020-05-12 RX ORDER — MONTELUKAST SODIUM 10 MG/1
10 TABLET ORAL DAILY
Qty: 30 TABLET | Refills: 3 | Status: SHIPPED
Start: 2020-05-12 | End: 2020-08-25

## 2020-05-12 RX ORDER — CELECOXIB 200 MG/1
200 CAPSULE ORAL EVERY MORNING
Qty: 90 CAPSULE | Refills: 1 | Status: SHIPPED | OUTPATIENT
Start: 2020-05-12 | End: 2020-06-23 | Stop reason: SDUPTHER

## 2020-05-12 RX ORDER — ESCITALOPRAM OXALATE 10 MG/1
10 TABLET ORAL EVERY MORNING
Qty: 90 TABLET | Refills: 1 | Status: SHIPPED | OUTPATIENT
Start: 2020-05-12 | End: 2020-07-08 | Stop reason: SDUPTHER

## 2020-05-12 RX ORDER — PREDNISONE 1 MG/1
TABLET ORAL
Qty: 36 TABLET | Refills: 0 | Status: SHIPPED | OUTPATIENT
Start: 2020-05-12 | End: 2020-08-25 | Stop reason: ALTCHOICE

## 2020-05-12 RX ORDER — HYDROCHLOROTHIAZIDE 12.5 MG/1
12.5 CAPSULE, GELATIN COATED ORAL EVERY MORNING
Qty: 90 CAPSULE | Refills: 1 | Status: SHIPPED | OUTPATIENT
Start: 2020-05-12 | End: 2020-07-27 | Stop reason: SDUPTHER

## 2020-05-12 ASSESSMENT — PATIENT HEALTH QUESTIONNAIRE - PHQ9
SUM OF ALL RESPONSES TO PHQ QUESTIONS 1-9: 0
SUM OF ALL RESPONSES TO PHQ QUESTIONS 1-9: 0
1. LITTLE INTEREST OR PLEASURE IN DOING THINGS: 0
SUM OF ALL RESPONSES TO PHQ9 QUESTIONS 1 & 2: 0
2. FEELING DOWN, DEPRESSED OR HOPELESS: 0
DEPRESSION UNABLE TO ASSESS: FUNCTIONAL CAPACITY MOTIVATION LIMITS ACCURACY

## 2020-05-12 NOTE — PROGRESS NOTES
rebound. Musculoskeletal:         General: Tenderness (l lateral hip on palpation, stiff w some pain on int/ext rotation) present. Lymphadenopathy:      Cervical: No cervical adenopathy. Skin:     General: Skin is warm and dry. Neurological:      General: No focal deficit present. Mental Status: She is alert. Psychiatric:         Mood and Affect: Mood normal.         ASSESSMENT:    1. Essential hypertension    2. Night sweats    3. Pruritic condition    4. Primary osteoarthritis of both hands    5. Left lumbar radiculopathy    6. Anxiety    7. Trigger middle finger of right hand    8. Non-seasonal allergic rhinitis, unspecified trigger    9. Left hip pain    10. Mixed hyperlipidemia    11. Visit for screening mammogram    12. Age-related osteoporosis without current pathological fracture        PLAN:    Zeus Ruiz was seen today for joint pain, hip pain, other and other. Diagnoses and all orders for this visit:    Essential hypertension - Blood pressure stable and will continue current regimen. Will plan periodic monitoring of renal function, electrolytes, lipid profile. -     hydroCHLOROthiazide (MICROZIDE) 12.5 MG capsule; Take 1 capsule by mouth every morning  -     XR CHEST STANDARD (2 VW); Future    Night sweats- am concerned that in addition to some wt loss, hot flashes, could have B sx. Will screen ESR and CBC, CRP, also check CXR, thyroid fxn. If wt stabilizes, cont monitor but if sx progress may consider additional testing and perhaps referral to Heme/Onc.  -     SEDIMENTATION RATE; Future  -     C-REACTIVE PROTEIN; Future  -     Comprehensive Metabolic Panel; Future  -     CBC Auto Differential; Future  -     TSH without Reflex; Future  -     XR CHEST STANDARD (2 VW); Future  -     TSH without Reflex; Future    Pruritic condition- also could be related to allergy such as her diuretic, could be allergic, scr lab for underlying infection, liver or kidney dz.   Empiric trial for possible without current pathological fracture  -     DEXA Bone Density Axial Skeleton;  Future

## 2020-05-12 NOTE — PATIENT INSTRUCTIONS
together and your knees bent. 2. Raise your top knee, but keep your feet together. Do not let your hips roll back. Your legs should open up like a clamshell. 3. Hold for 6 seconds. 4. Slowly lower your knee back down. Rest for 10 seconds. 5. Repeat 8 to 12 times. Follow-up care is a key part of your treatment and safety. Be sure to make and go to all appointments, and call your doctor if you are having problems. It's also a good idea to know your test results and keep a list of the medicines you take. Where can you learn more? Go to [email or web address] and sign in to your [employer] account. Enter D129 in the [employer] box to learn more about \"Hip Bursitis: Exercises. \"     If you do not have an account, please click on the \"Sign Up Now\" link. Current as of: June 26, 2019Content Version: 12.4  © 5440-9610 Healthwise, Incorporated. Care instructions adapted under license by [facility] ([facility]). If you have questions about a medical condition or this instruction, always ask your healthcare professional. [place] [address] any warranty or liability for your use of this information.

## 2020-05-20 ENCOUNTER — HOSPITAL ENCOUNTER (OUTPATIENT)
Dept: MAMMOGRAPHY | Age: 75
Discharge: HOME | End: 2020-05-20
Payer: MEDICARE

## 2020-05-20 ENCOUNTER — HOSPITAL ENCOUNTER (OUTPATIENT)
Dept: WOMENS IMAGING | Age: 75
Discharge: HOME | End: 2020-05-20
Payer: MEDICARE

## 2020-05-20 PROCEDURE — 77063 BREAST TOMOSYNTHESIS BI: CPT

## 2020-05-20 PROCEDURE — 77080 DXA BONE DENSITY AXIAL: CPT

## 2020-05-20 NOTE — RESULT ENCOUNTER NOTE
Call pt, bone density indicates osteoporosis has worsened at L hip, w drop in T score from negative 2.5 to negative 3.0. Suggest we restart fosamax 70mg weekly pls.   Place med changes/corrections on EPIC medlist please

## 2020-05-21 RX ORDER — ALENDRONATE SODIUM 70 MG/1
70 TABLET ORAL
Qty: 4 TABLET | Refills: 3 | Status: SHIPPED
Start: 2020-05-21 | End: 2020-08-25

## 2020-06-03 ENCOUNTER — HOSPITAL ENCOUNTER (OUTPATIENT)
Dept: ULTRASOUND IMAGING | Age: 75
Discharge: HOME | End: 2020-06-03
Payer: MEDICARE

## 2020-06-03 ENCOUNTER — HOSPITAL ENCOUNTER (OUTPATIENT)
Dept: WOMENS IMAGING | Age: 75
Discharge: HOME | End: 2020-06-03
Payer: MEDICARE

## 2020-06-03 PROCEDURE — 76642 ULTRASOUND BREAST LIMITED: CPT

## 2020-06-03 PROCEDURE — 77065 DX MAMMO INCL CAD UNI: CPT

## 2020-06-23 RX ORDER — CELECOXIB 200 MG/1
200 CAPSULE ORAL EVERY MORNING
Qty: 90 CAPSULE | Refills: 1 | Status: SHIPPED | OUTPATIENT
Start: 2020-06-23 | End: 2020-08-25 | Stop reason: SDUPTHER

## 2020-07-08 RX ORDER — ESCITALOPRAM OXALATE 10 MG/1
10 TABLET ORAL EVERY MORNING
Qty: 90 TABLET | Refills: 1 | Status: SHIPPED | OUTPATIENT
Start: 2020-07-08 | End: 2020-08-25 | Stop reason: SDUPTHER

## 2020-07-27 RX ORDER — HYDROCHLOROTHIAZIDE 12.5 MG/1
12.5 CAPSULE, GELATIN COATED ORAL EVERY MORNING
Qty: 90 CAPSULE | Refills: 1 | Status: SHIPPED | OUTPATIENT
Start: 2020-07-27 | End: 2020-08-25 | Stop reason: SDUPTHER

## 2020-08-07 NOTE — FLOWSHEET NOTE
Outpatient Physical Therapy  [place]           [x] Phone: [phone]   Fax: [phone]  [place]           [] Phone: [phone]   Fax: [phone]        Physical Therapy Daily Discharge Note  Date:  2020    Patient Name:  Cassi Olivares    :  1945  MRN: [id number]    Diagnosis:   Diagnosis: ITB syndrome, s/p total knee arthroplasty left knee (9 yrs ago.)  Date of Injury/Surgery:   Treatment Diagnosis: Treatment Diagnosis: antalgic gait and transfers, dec LLE strength, pain left hip and ITB    Insurance/Certification information: PT Insurance Information: Medicare/[facility]   Referring Physician:  Referring Practitioner: eufemia Wood assistant  Next Doctor Visit:  May 8, 2019 w/Dr. Justice Adorno (orthopeodic surgeon)  Plan of care signed (Y/N):  N  Outcome Measure: LEFS  Visit# / total visits:    Pain level:      5-6/10 (took Aleve prior to appointment)  POC DATE RANGE:  19 - 19 (end date extended to allow for completion of POC.)        Objective:    Unable to complete an assessment of the patient and their progress towards their goals secondary to discontinuation of therapy. Cassi Olivares last appointment was on 19      Communication with other providers:    Faxed Discharge note secondary to discontinuation of therapy sevices      Assessment:    Cassi Olivares has discontinued therapy services and at this time they will be discharged from our facility. If their is any future needs please don't hesitate to call our offices and resubmit a new therapy order. We appreciate your referral and letting us serve your patients.        Interventions PRN:  [x] Therapeutic Exercise  [] Modalities:  [x] Therapeutic Activity     [] Ultrasound  [] Estim  [] Gait Training      [] Cervical Traction [] Lumbar Traction  [x] Neuromuscular Re-education    [] Cold/hotpack [] Iontophoresis   [x] Instruction in HEP      [] Vasopneumatic   [] Dry Needling    [x] Manual Therapy [] Aquatic Therapy              Electronically signed by:    Himanshu Washington PT,DPT    Director of Rehabilitation  8/7/2020, 11:20 AM

## 2020-08-25 ENCOUNTER — OFFICE VISIT (OUTPATIENT)
Dept: INTERNAL MEDICINE CLINIC | Age: 75
End: 2020-08-25
Payer: MEDICARE

## 2020-08-25 VITALS
OXYGEN SATURATION: 97 % | SYSTOLIC BLOOD PRESSURE: 124 MMHG | DIASTOLIC BLOOD PRESSURE: 72 MMHG | HEART RATE: 70 BPM | RESPIRATION RATE: 14 BRPM | BODY MASS INDEX: 26.31 KG/M2 | WEIGHT: 134 LBS | HEIGHT: 60 IN

## 2020-08-25 LAB
BILIRUBIN, POC: NORMAL
BLOOD URINE, POC: NORMAL
CLARITY, POC: CLEAR
COLOR, POC: YELLOW
GLUCOSE URINE, POC: NORMAL
KETONES, POC: NORMAL
LEUKOCYTE EST, POC: NORMAL
NITRITE, POC: NORMAL
PH, POC: 7
PROTEIN, POC: NORMAL
SPECIFIC GRAVITY, POC: 1.02
UROBILINOGEN, POC: 0.2

## 2020-08-25 PROCEDURE — 81002 URINALYSIS NONAUTO W/O SCOPE: CPT | Performed by: INTERNAL MEDICINE

## 2020-08-25 PROCEDURE — G8427 DOCREV CUR MEDS BY ELIG CLIN: HCPCS | Performed by: INTERNAL MEDICINE

## 2020-08-25 PROCEDURE — 1036F TOBACCO NON-USER: CPT | Performed by: INTERNAL MEDICINE

## 2020-08-25 PROCEDURE — 99214 OFFICE O/P EST MOD 30 MIN: CPT | Performed by: INTERNAL MEDICINE

## 2020-08-25 PROCEDURE — G8417 CALC BMI ABV UP PARAM F/U: HCPCS | Performed by: INTERNAL MEDICINE

## 2020-08-25 PROCEDURE — 1123F ACP DISCUSS/DSCN MKR DOCD: CPT | Performed by: INTERNAL MEDICINE

## 2020-08-25 PROCEDURE — 1090F PRES/ABSN URINE INCON ASSESS: CPT | Performed by: INTERNAL MEDICINE

## 2020-08-25 PROCEDURE — G8399 PT W/DXA RESULTS DOCUMENT: HCPCS | Performed by: INTERNAL MEDICINE

## 2020-08-25 PROCEDURE — 4040F PNEUMOC VAC/ADMIN/RCVD: CPT | Performed by: INTERNAL MEDICINE

## 2020-08-25 RX ORDER — CIPROFLOXACIN 500 MG/1
500 TABLET, FILM COATED ORAL 2 TIMES DAILY
Qty: 14 TABLET | Refills: 0 | Status: SHIPPED | OUTPATIENT
Start: 2020-08-25 | End: 2020-09-01

## 2020-08-25 RX ORDER — ESCITALOPRAM OXALATE 10 MG/1
10 TABLET ORAL EVERY MORNING
Qty: 90 TABLET | Refills: 1 | Status: SHIPPED | OUTPATIENT
Start: 2020-08-25 | End: 2021-01-13 | Stop reason: SDUPTHER

## 2020-08-25 RX ORDER — HYDROCHLOROTHIAZIDE 12.5 MG/1
12.5 CAPSULE, GELATIN COATED ORAL EVERY MORNING
Qty: 90 CAPSULE | Refills: 1 | Status: SHIPPED | OUTPATIENT
Start: 2020-08-25 | End: 2021-02-15 | Stop reason: SDUPTHER

## 2020-08-25 RX ORDER — CELECOXIB 200 MG/1
200 CAPSULE ORAL EVERY MORNING
Qty: 90 CAPSULE | Refills: 1 | Status: SHIPPED | OUTPATIENT
Start: 2020-08-25 | End: 2021-01-13 | Stop reason: SDUPTHER

## 2020-08-25 NOTE — PROGRESS NOTES
Satish Brothers  1945 08/25/20    SUBJECTIVE:  She has helped a friend who is 89 to come home to her , also 89. Much happier now. Hypertension: Stable. Denies CP, SOB, cough, visual changes, dizziness, palpitations or HA. Still some periodic sweats mainly on her head, more w activity and golfing. Prior NIGHT SWEATS are spont better. Mood stable, I looked up lexapro and this can cause diaphoresis. We will try lower dose 1/2 tab daily    OA hands, did have injection to L middle finger and sx improved. Dysuria noted to be dark and sl foul the past week. No fever or chills, hematuria. OBJECTIVE:    /72   Pulse 70   Resp 14   Ht 5' (1.524 m)   Wt 134 lb (60.8 kg)   LMP  (LMP Unknown)   SpO2 97%   BMI 26.17 kg/m²     Physical Exam  Vitals signs reviewed. Constitutional:       Appearance: She is well-developed. Neck:      Musculoskeletal: Neck supple. Cardiovascular:      Rate and Rhythm: Normal rate and regular rhythm. Heart sounds: Normal heart sounds. No murmur. No friction rub. No gallop. Pulmonary:      Effort: Pulmonary effort is normal. No respiratory distress. Breath sounds: Normal breath sounds. No wheezing or rales. Abdominal:      General: Bowel sounds are normal. There is no distension. Palpations: Abdomen is soft. Tenderness: There is no abdominal tenderness. Neurological:      Mental Status: She is alert. ASSESSMENT:    1. Essential hypertension    2. Left lumbar radiculopathy    3. Anxiety    4. Dysuria        PLAN:    Elizabeth Diamond was seen today for hypertension. Diagnoses and all orders for this visit:    Essential hypertension- Blood pressure stable and will continue current regimen. -     hydroCHLOROthiazide (MICROZIDE) 12.5 MG capsule;  Take 1 capsule by mouth every morning    Left lumbar radiculopathy - LBP stable on pain regimen, RFs given as noted and will monitor.    -     celecoxib (CELEBREX) 200 MG capsule; Take 1 capsule by mouth every morning    Anxiety- suspect lexapro may be causing some of her profuse sweats. Will decr fr 10-5mg. If sx persist despite dose adjustment, may consider alt SSRI later  -     escitalopram (LEXAPRO) 10 MG tablet; Take 1 tablet by mouth every morning    Dysuria- ua neg but w ongoing sx, is to push fluids and try empiric course of cipro, To call back one week if not improving.      -     POCT Urinalysis no Micro  -     ciprofloxacin (CIPRO) 500 MG tablet;  Take 1 tablet by mouth 2 times daily for 7 days

## 2020-08-25 NOTE — PATIENT INSTRUCTIONS
For weaning down lexapro, for two weeks alternate 1/2 w 1 tab daily, then 1/2 daily    For hand arthritis also consider otc voltaren gel.

## 2020-09-24 ENCOUNTER — NURSE ONLY (OUTPATIENT)
Dept: INTERNAL MEDICINE CLINIC | Age: 75
End: 2020-09-24
Payer: MEDICARE

## 2020-09-24 PROCEDURE — 90653 IIV ADJUVANT VACCINE IM: CPT | Performed by: NURSE PRACTITIONER

## 2020-09-24 PROCEDURE — G0008 ADMIN INFLUENZA VIRUS VAC: HCPCS | Performed by: NURSE PRACTITIONER

## 2021-01-13 DIAGNOSIS — M54.16 LEFT LUMBAR RADICULOPATHY: ICD-10-CM

## 2021-01-13 DIAGNOSIS — F41.9 ANXIETY: ICD-10-CM

## 2021-01-13 RX ORDER — CELECOXIB 200 MG/1
200 CAPSULE ORAL EVERY MORNING
Qty: 90 CAPSULE | Refills: 1 | Status: SHIPPED | OUTPATIENT
Start: 2021-01-13 | End: 2021-07-30 | Stop reason: SDUPTHER

## 2021-01-13 RX ORDER — ESCITALOPRAM OXALATE 10 MG/1
10 TABLET ORAL EVERY MORNING
Qty: 90 TABLET | Refills: 1 | Status: SHIPPED | OUTPATIENT
Start: 2021-01-13 | End: 2021-08-25 | Stop reason: SDUPTHER

## 2021-02-15 DIAGNOSIS — I10 ESSENTIAL HYPERTENSION: ICD-10-CM

## 2021-02-15 RX ORDER — HYDROCHLOROTHIAZIDE 12.5 MG/1
12.5 CAPSULE, GELATIN COATED ORAL EVERY MORNING
Qty: 90 CAPSULE | Refills: 1 | Status: SHIPPED | OUTPATIENT
Start: 2021-02-15 | End: 2021-08-25 | Stop reason: SDUPTHER

## 2021-02-26 ENCOUNTER — OFFICE VISIT (OUTPATIENT)
Dept: INTERNAL MEDICINE CLINIC | Age: 76
End: 2021-02-26
Payer: MEDICARE

## 2021-02-26 VITALS
OXYGEN SATURATION: 95 % | HEART RATE: 77 BPM | WEIGHT: 135 LBS | RESPIRATION RATE: 14 BRPM | SYSTOLIC BLOOD PRESSURE: 122 MMHG | BODY MASS INDEX: 26.5 KG/M2 | HEIGHT: 60 IN | DIASTOLIC BLOOD PRESSURE: 72 MMHG

## 2021-02-26 DIAGNOSIS — R07.89 ATYPICAL CHEST PAIN: ICD-10-CM

## 2021-02-26 DIAGNOSIS — E78.2 MIXED HYPERLIPIDEMIA: ICD-10-CM

## 2021-02-26 DIAGNOSIS — M51.36 DDD (DEGENERATIVE DISC DISEASE), LUMBAR: ICD-10-CM

## 2021-02-26 DIAGNOSIS — I10 ESSENTIAL HYPERTENSION: ICD-10-CM

## 2021-02-26 DIAGNOSIS — Z00.00 ROUTINE GENERAL MEDICAL EXAMINATION AT A HEALTH CARE FACILITY: Primary | ICD-10-CM

## 2021-02-26 PROCEDURE — 4040F PNEUMOC VAC/ADMIN/RCVD: CPT | Performed by: INTERNAL MEDICINE

## 2021-02-26 PROCEDURE — 1123F ACP DISCUSS/DSCN MKR DOCD: CPT | Performed by: INTERNAL MEDICINE

## 2021-02-26 PROCEDURE — G0439 PPPS, SUBSEQ VISIT: HCPCS | Performed by: INTERNAL MEDICINE

## 2021-02-26 PROCEDURE — G8482 FLU IMMUNIZE ORDER/ADMIN: HCPCS | Performed by: INTERNAL MEDICINE

## 2021-02-26 RX ORDER — TRAMADOL HYDROCHLORIDE 50 MG/1
25 TABLET ORAL 2 TIMES DAILY
Qty: 30 TABLET | Refills: 5 | Status: SHIPPED | OUTPATIENT
Start: 2021-02-26 | End: 2021-03-10

## 2021-02-26 ASSESSMENT — PATIENT HEALTH QUESTIONNAIRE - PHQ9
1. LITTLE INTEREST OR PLEASURE IN DOING THINGS: 0
SUM OF ALL RESPONSES TO PHQ QUESTIONS 1-9: 0
SUM OF ALL RESPONSES TO PHQ9 QUESTIONS 1 & 2: 0

## 2021-02-26 ASSESSMENT — LIFESTYLE VARIABLES
HOW OFTEN DO YOU HAVE A DRINK CONTAINING ALCOHOL: 0
AUDIT-C TOTAL SCORE: INCOMPLETE
HOW OFTEN DO YOU HAVE A DRINK CONTAINING ALCOHOL: NEVER
AUDIT TOTAL SCORE: INCOMPLETE

## 2021-02-26 NOTE — PROGRESS NOTES
Medicare Annual Wellness Visit  Name: Kady Cross Date: 2021   MRN: [id number] Sex: Female   Age: 66 y.o. Ethnicity: Non-/Non    : 1945 Race: White      Saskia Alvarado is here for No chief complaint on file. Screenings for behavioral, psychosocial and functional/safety risks, and cognitive dysfunction are all negative except as indicated below. These results, as well as other patient data from the [address] form, are documented in Flowsheets linked to this Encounter. The past week some aching noted L chest, arm. Not related to activity, sl sore on palpation. No SOB, sweats, nausea. Last stress test was . HTN- bp stable at home, some vague cp as noted. She did have covid vac x2,    Mood stable on lexapro w/o current anxiety, depression or panic attacks. Lipids:  Has history of high cholesterol, not on medication but trying to continue regular low fat diet and maintenance of weight, regular exercise. Chr LBP, mod on xray  and grad worse. Also has seen ortho in [place], and back specialist.  We discussed trying low dose tramadol. Controlled substances monitoring: possible medication side effects, risk of tolerance and/or dependence, and alternative treatments discussed, no signs of potential drug abuse or diversion identified and [place]ARRS report reviewed today- activity consistent with treatment plan. Allergies   Allergen Reactions    Flagyl [Metronidazole]      Nausea, stomach upset    Lisinopril      cough    Losartan Potassium      cough    Norvasc [Amlodipine Besylate]      Swelling face and legs    Tramadol        Prior to Visit Medications    Medication Sig Taking?  Authorizing Provider   hydroCHLOROthiazide (MICROZIDE) 12.5 MG capsule Take 1 capsule by mouth every morning  Judson John MD   escitalopram (LEXAPRO) 10 MG tablet Take 1 tablet by mouth every morning  Judson John MD celecoxib (CELEBREX) 200 MG capsule Take 1 capsule by mouth every morning  Berna Velazquez MD   esomeprazole Magnesium (NEXIUM) 20 MG PACK Take 20 mg by mouth daily  Historical Provider, MD       Past Medical History:   Diagnosis Date    Abnormal EKG 2016    Anxiety     Arthritis     Basal cell carcinoma     skin ca    Chest pain, atypical     Diverticulitis     episodes 2019 and 10/2019, **IF CONT TO RECUR CONSIDER SURGICAL CONSULT DR PRIETO**    Elevated sed rate     42 IN 2015- ? ETIOLOGY    Family history of colon cancer     DUE Q5 YRS    Family history of coronary artery disease     Father  from [place].    Female incontinence     GERD (gastroesophageal reflux disease)     H/O cardiovascular stress test 16    cardiolite-normal,EF70%    H/O exercise stress test 2016    Evidence of ischemia per EKG on reg. treadmill stress test.    Hiatal hernia     on UGI     Hiatal hernia     noted on EGD Dr Sung Hairston     Hip pain     Hyperlipidemia     Hypertension     Nasal pruritis     Osteoporosis     PONV (postoperative nausea and vomiting)     S/P colonoscopy 2018    Dr Sung Hairston, recheck AS NEEDED    Vertigo        Past Surgical History:   Procedure Laterality Date    APPENDECTOMY      BACK SURGERY      Dr. Jayna Goff- fusion    CATARACT REMOVAL Bilateral 2017    CHOLECYSTECTOMY, LAPAROSCOPIC  14    Dr Patrick Kahn  2018    HYSTERECTOMY      JOINT REPLACEMENT      bilateral knees    SHOULDER ARTHROSCOPY Left 6/3/14    Dr Marina Lima  2010    Face Dr. Santi Mittal      left Dr Jennifer Workman      right Dr. Mariya Nunez  2018    UPPER GASTROINTESTINAL ENDOSCOPY N/A 2018 Patient's complete Health Risk Assessment and screening values have been reviewed and are found in Flowsheets. The following problems were reviewed today and where indicated follow up appointments were made and/or referrals ordered. Positive Risk Factor Screenings with Interventions:          General Health and ACP:     Advance Directives     Power of  Living Will ACP-Advance Directive ACP-Power of     Not on File Not on File Not on File Not on File      General Health Risk Interventions:  · NO ISSUES NOTED        Personalized Preventive Plan   Current Health Maintenance Status  Immunization History   Administered Date(s) Administered    Influenza 10/30/2012, 10/16/2013    Influenza, High Dose (Fluzone 65 yrs and older) 10/15/2014, 12/11/2015, 10/17/2016, 11/07/2017, 10/22/2018, 10/04/2019    Influenza, Triv, inactivated, subunit, adjuvanted, IM (Fluad 65 yrs and older) 09/24/2020    Pneumococcal Conjugate 13-valent ([id number]) 11/28/2016    Pneumococcal Polysaccharide ([id number]) 09/14/2011        Health Maintenance   Topic Date Due    Hepatitis C screen  1945    COVID-19 Vaccine (1 of 2) 12/24/1959    DTaP/Tdap/Td vaccine (1 - Tdap) 12/24/1962    Shingles Vaccine (1 of 2) 12/24/1993    Annual Wellness Visit (AWV)  05/29/2019    Potassium monitoring  05/12/2021    Creatinine monitoring  05/12/2021    DEXA (modify frequency per FRAX score)  Completed    Flu vaccine  Completed    Pneumococcal 65+ years Vaccine  Completed    Hepatitis A vaccine  Aged Out    Hepatitis B vaccine  Aged Out    Hib vaccine  Aged Out    Meningococcal (ACWY) vaccine  Aged Out     Recommendations for [employer] Due: see orders and patient instructions/AVS.  . Recommended screening schedule for the next 5-10 years is provided to the patient in written form: see Patient Oleg Brandt was seen today for hypertension.     Diagnoses and all orders for this visit: Routine general medical examination at a health care facility  - remains independent, functional and active, no indications/needs for other interventions noted at this time- except as noted below and also findings noted on screening medicare questions.    -     HEPATITIS C ANTIBODY; Future    Atypical chest pain - MULTIPLE CARDIAC RISK FACTORS AND LIKELY NEED ISCHEMIC W/U.  REFER TO DR Iris Vasquez MD, Cardiology, [facility]    Essential hypertension - Blood pressure stable and will continue current regimen. Will plan periodic monitoring of renal function, electrolytes, lipid profile. -     Comprehensive Metabolic Panel; Future  -     CBC Auto Differential; Future  -     Lipid Panel; Future  -     TSH without Reflex; Future    Mixed hyperlipidemia  - Pt will continue to work on a low fat diet and also exercise, wt loss as appropriate. Will continue periodic monitoring of fasting lipid profile, glucose, liver function. -     Comprehensive Metabolic Panel; Future  -     CBC Auto Differential; Future  -     Lipid Panel; Future  -     TSH without Reflex; Future    DDD (degenerative disc disease), lumbar- WILL TRY LOW DOSING OF TRAMADOL PRN, 1/2 TAB BID. -     traMADol (ULTRAM) 50 MG tablet; Take 0.5 tablets by mouth 2 times daily for 30 days.

## 2021-02-26 NOTE — PATIENT INSTRUCTIONS
Personalized Preventive Plan for Meron Aguirre - 2/26/2021  Medicare offers a range of preventive health benefits. Some of the tests and screenings are paid in full while other may be subject to a deductible, co-insurance, and/or copay. Some of these benefits include a comprehensive review of your medical history including lifestyle, illnesses that may run in your family, and various assessments and screenings as appropriate. After reviewing your medical record and screening and assessments performed today your provider may have ordered immunizations, labs, imaging, and/or referrals for you. A list of these orders (if applicable) as well as your Preventive Care list are included within your After Visit Summary for your review. Other Preventive Recommendations:    · A preventive eye exam performed by an eye specialist is recommended every 1-2 years to screen for glaucoma; cataracts, macular degeneration, and other eye disorders. · A preventive dental visit is recommended every 6 months. · Try to get at least 150 minutes of exercise per week or 10,000 steps per day on a pedometer . · Order or download the FREE \"Exercise & Physical Activity: Your Everyday Guide\" from [employer]. Call 7-102.326.7694 or search [employer] online. · You need 2925-2234 mg of calcium and 6944-2921 IU of vitamin D per day. It is possible to meet your calcium requirement with diet alone, but a vitamin D supplement is usually necessary to meet this goal.  · When exposed to the sun, use a sunscreen that protects against both UVA and UVB radiation with an SPF of 30 or greater. Reapply every 2 to 3 hours or after sweating, drying off with a towel, or swimming. · Always wear a seat belt when traveling in a car. Always wear a helmet when riding a bicycle or motorcycle.

## 2021-03-01 DIAGNOSIS — Z00.00 ROUTINE GENERAL MEDICAL EXAMINATION AT A HEALTH CARE FACILITY: Primary | ICD-10-CM

## 2021-03-01 DIAGNOSIS — I10 ESSENTIAL HYPERTENSION: ICD-10-CM

## 2021-03-01 DIAGNOSIS — Z00.00 ROUTINE GENERAL MEDICAL EXAMINATION AT A HEALTH CARE FACILITY: ICD-10-CM

## 2021-03-01 DIAGNOSIS — E78.2 MIXED HYPERLIPIDEMIA: ICD-10-CM

## 2021-03-01 LAB
A/G RATIO: 1.7 (ref 1.1–2.2)
ALBUMIN SERPL-MCNC: 4 G/DL (ref 3.4–5)
ALP BLD-CCNC: 87 U/L (ref 40–129)
ALT SERPL-CCNC: 12 U/L (ref 10–40)
ANION GAP SERPL CALCULATED.3IONS-SCNC: 11 MMOL/L (ref 3–16)
AST SERPL-CCNC: 15 U/L (ref 15–37)
BASOPHILS ABSOLUTE: 0.1 K/UL (ref 0–0.2)
BASOPHILS RELATIVE PERCENT: 1.2 %
BILIRUB SERPL-MCNC: 0.6 MG/DL (ref 0–1)
BUN BLDV-MCNC: 29 MG/DL (ref 7–20)
CALCIUM SERPL-MCNC: 9.4 MG/DL (ref 8.3–10.6)
CHLORIDE BLD-SCNC: 104 MMOL/L (ref 99–110)
CHOLESTEROL, TOTAL: 200 MG/DL (ref 0–199)
CO2: 26 MMOL/L (ref 21–32)
CREAT SERPL-MCNC: 1.1 MG/DL (ref 0.6–1.2)
EOSINOPHILS ABSOLUTE: 0.2 K/UL (ref 0–0.6)
EOSINOPHILS RELATIVE PERCENT: 4 %
GFR AFRICAN AMERICAN: 58
GFR NON-AFRICAN AMERICAN: 48
GLOBULIN: 2.3 G/DL
GLUCOSE BLD-MCNC: 93 MG/DL (ref 70–99)
HCT VFR BLD CALC: 36.7 % (ref 36–48)
HDLC SERPL-MCNC: 56 MG/DL (ref 40–60)
HEMOGLOBIN: 12.6 G/DL (ref 12–16)
LDL CHOLESTEROL CALCULATED: 117 MG/DL
LYMPHOCYTES ABSOLUTE: 0.8 K/UL (ref 1–5.1)
LYMPHOCYTES RELATIVE PERCENT: 16.8 %
MCH RBC QN AUTO: 31.2 PG (ref 26–34)
MCHC RBC AUTO-ENTMCNC: 34.4 G/DL (ref 31–36)
MCV RBC AUTO: 90.6 FL (ref 80–100)
MONOCYTES ABSOLUTE: 0.5 K/UL (ref 0–1.3)
MONOCYTES RELATIVE PERCENT: 11.5 %
NEUTROPHILS ABSOLUTE: 3.1 K/UL (ref 1.7–7.7)
NEUTROPHILS RELATIVE PERCENT: 66.5 %
PDW BLD-RTO: 12.8 % (ref 12.4–15.4)
PLATELET # BLD: 191 K/UL (ref 135–450)
PMV BLD AUTO: 9.7 FL (ref 5–10.5)
POTASSIUM SERPL-SCNC: 4.2 MMOL/L (ref 3.5–5.1)
RBC # BLD: 4.05 M/UL (ref 4–5.2)
SODIUM BLD-SCNC: 141 MMOL/L (ref 136–145)
TOTAL PROTEIN: 6.3 G/DL (ref 6.4–8.2)
TRIGL SERPL-MCNC: 134 MG/DL (ref 0–150)
TSH SERPL DL<=0.05 MIU/L-ACNC: 2.18 UIU/ML (ref 0.27–4.2)
VLDLC SERPL CALC-MCNC: 27 MG/DL
WBC # BLD: 4.7 K/UL (ref 4–11)

## 2021-03-01 PROCEDURE — 36415 COLL VENOUS BLD VENIPUNCTURE: CPT | Performed by: INTERNAL MEDICINE

## 2021-03-02 LAB — HEPATITIS C ANTIBODY INTERPRETATION: NORMAL

## 2021-03-10 ENCOUNTER — INITIAL CONSULT (OUTPATIENT)
Dept: CARDIOLOGY CLINIC | Age: 76
End: 2021-03-10
Payer: MEDICARE

## 2021-03-10 VITALS
DIASTOLIC BLOOD PRESSURE: 72 MMHG | SYSTOLIC BLOOD PRESSURE: 118 MMHG | BODY MASS INDEX: 26.46 KG/M2 | HEART RATE: 85 BPM | HEIGHT: 60 IN | WEIGHT: 134.8 LBS

## 2021-03-10 DIAGNOSIS — R07.9 CHEST PAIN ON EXERTION: Primary | ICD-10-CM

## 2021-03-10 PROCEDURE — G8399 PT W/DXA RESULTS DOCUMENT: HCPCS | Performed by: INTERNAL MEDICINE

## 2021-03-10 PROCEDURE — 1036F TOBACCO NON-USER: CPT | Performed by: INTERNAL MEDICINE

## 2021-03-10 PROCEDURE — 1123F ACP DISCUSS/DSCN MKR DOCD: CPT | Performed by: INTERNAL MEDICINE

## 2021-03-10 PROCEDURE — G8417 CALC BMI ABV UP PARAM F/U: HCPCS | Performed by: INTERNAL MEDICINE

## 2021-03-10 PROCEDURE — 1090F PRES/ABSN URINE INCON ASSESS: CPT | Performed by: INTERNAL MEDICINE

## 2021-03-10 PROCEDURE — G8427 DOCREV CUR MEDS BY ELIG CLIN: HCPCS | Performed by: INTERNAL MEDICINE

## 2021-03-10 PROCEDURE — 4040F PNEUMOC VAC/ADMIN/RCVD: CPT | Performed by: INTERNAL MEDICINE

## 2021-03-10 PROCEDURE — 93000 ELECTROCARDIOGRAM COMPLETE: CPT | Performed by: INTERNAL MEDICINE

## 2021-03-10 PROCEDURE — G8482 FLU IMMUNIZE ORDER/ADMIN: HCPCS | Performed by: INTERNAL MEDICINE

## 2021-03-10 PROCEDURE — 99204 OFFICE O/P NEW MOD 45 MIN: CPT | Performed by: INTERNAL MEDICINE

## 2021-03-10 NOTE — LETTER
Tamia Humphries Cough  1945  [id number]    Have you had any Chest Pain that is not new? - Yes  If Yes DO EKG - How does it feel - Sharp Pain   How long does the pain last - minutes  Comes and goes   How long have you been having the pain -Weeks     DO EKG IF: Patient has a Heart Rate above 100 or below 40     CAD (Coronary Artery Disease) patient should have one on file every 6 months        Have you had any Shortness of Breath - No      Have you had any dizziness - No     Sitting wait 5 minutes do supine (laying down) wait 5 minutes then do standing - log each in \"vitals\" area in Epic   Be sure to ask what symptoms they are having if they get dizzy while completing ortho stats such as: room spinning, nausea, etc.    Have you had any palpitations that are not new?  - No             Do you have any edema - NONE       Do you have a surgery or procedure scheduled in the near future - No

## 2021-03-10 NOTE — PROGRESS NOTES
Hollis Daly  is a  New patient  ,66 y.o.   female here for evaluation of the following chief complaint(s):    HTN        SUBJECTIVE/OBJECTIVE:  HPI : h/o  HTN, hyperlipidimea now here  Has been having lt sided mild CP nonradiating , nonexertional for  afew weeks, with mild SOB    Review of Systems    CP    Vitals:    03/10/21 1545   BP: 118/72   Pulse: 85   Weight: 134 lb 12.8 oz (61.1 kg)   Height: 5' (1.524 m)     /72   Pulse 85   Ht 5' (1.524 m)   Wt 134 lb 12.8 oz (61.1 kg)   LMP  (LMP Unknown)   BMI 26.33 kg/m²   No flowsheet data found. Wt Readings from Last 3 Encounters:   03/10/21 134 lb 12.8 oz (61.1 kg)   02/26/21 135 lb (61.2 kg)   08/25/20 134 lb (60.8 kg)     Body mass index is 26.33 kg/m². Physical Exam     Neck: JVD      Lungs : clear    Cardio : Si and S2 audilble      Ext: edema      All pertinent data reviewed      Meds : reviewed         Tests ordered    Stress cardioite    ASSESSMENT/PLAN:    - CP; stress cardiolite and echo    -  Hypertension: Patients blood pressure is normal. Patient is advised about low sodium diet. Present medical regimen will not be changed. -  LIPID MANAGEMENT:  Importance of lipid levels discussed with patient   and patient was given dietary advice. NCEP- ATP III guidelines reviewed with patient. -   Changes  in medicines made: No                                    An electronic signature was used to authenticate this note.     --Margy Martell MD

## 2021-03-24 ENCOUNTER — TELEPHONE (OUTPATIENT)
Dept: CARDIOLOGY CLINIC | Age: 76
End: 2021-03-24

## 2021-03-24 DIAGNOSIS — R06.02 SOB (SHORTNESS OF BREATH): ICD-10-CM

## 2021-03-24 DIAGNOSIS — R07.9 CHEST PAIN ON EXERTION: Primary | ICD-10-CM

## 2021-03-24 DIAGNOSIS — R94.31 ABNORMAL EKG: ICD-10-CM

## 2021-03-24 NOTE — TELEPHONE ENCOUNTER
There is an opening on the NM stress test for tomorrow, 3/25/2021. Spoke to Nikkie BOURNEto see if okay to add this pt per their insurance company. Okay to do so. Called pt's home & spoke to her , Anthony Coe about that there is an opening for a NM stress test tomorrow @ 8am. He stated she will be there. Educated him on her stress test for tomorrow. He voiced understanding & would let her know.

## 2021-03-25 ENCOUNTER — PROCEDURE VISIT (OUTPATIENT)
Dept: CARDIOLOGY CLINIC | Age: 76
End: 2021-03-25
Payer: MEDICARE

## 2021-03-25 DIAGNOSIS — R94.31 ABNORMAL EKG: ICD-10-CM

## 2021-03-25 DIAGNOSIS — R06.02 SOB (SHORTNESS OF BREATH): ICD-10-CM

## 2021-03-25 DIAGNOSIS — R07.9 CHEST PAIN ON EXERTION: ICD-10-CM

## 2021-03-25 LAB
LV EF: 58 %
LV EF: 60 %
LVEF MODALITY: NORMAL
LVEF MODALITY: NORMAL

## 2021-03-25 PROCEDURE — A9500 TC99M SESTAMIBI: HCPCS | Performed by: INTERNAL MEDICINE

## 2021-03-25 PROCEDURE — 93018 CV STRESS TEST I&R ONLY: CPT | Performed by: INTERNAL MEDICINE

## 2021-03-25 PROCEDURE — 93017 CV STRESS TEST TRACING ONLY: CPT | Performed by: INTERNAL MEDICINE

## 2021-03-25 PROCEDURE — 93016 CV STRESS TEST SUPVJ ONLY: CPT | Performed by: INTERNAL MEDICINE

## 2021-03-25 PROCEDURE — 93306 TTE W/DOPPLER COMPLETE: CPT | Performed by: INTERNAL MEDICINE

## 2021-03-25 PROCEDURE — 78452 HT MUSCLE IMAGE SPECT MULT: CPT | Performed by: INTERNAL MEDICINE

## 2021-03-26 ENCOUNTER — TELEPHONE (OUTPATIENT)
Dept: CARDIOLOGY CLINIC | Age: 76
End: 2021-03-26

## 2021-03-26 NOTE — TELEPHONE ENCOUNTER
Advised patient of test results. Left ventricular function is normal, EF is estimated at 55-60%. Mild left ventricular hypertrophy. E/A reversal; indeterminate diastolic function. Mild mitral and tricuspid regurgitation is present. RVSP is 23 mmHg. No evidence of pericardial effusion. Normal tracer uptake in all segments of myocardium on stress ans rest    images. Normal Lexiscan nuclear scintigraphic study suggestive of normal    myocardial perfusion. Gated images demonstrate normal left ventricular    systolic function with EF of 60 %.

## 2021-06-22 ENCOUNTER — HOSPITAL ENCOUNTER (OUTPATIENT)
Dept: PHYSICAL THERAPY | Age: 76
Setting detail: THERAPIES SERIES
Discharge: HOME | End: 2021-06-22
Payer: MEDICARE

## 2021-06-22 NOTE — PLAN OF CARE
Outpatient Physical Therapy           [facility]           [] Phone: [phone]   Fax: [phone]  [facility]           [] Phone: [phone]   Fax: [phone]     To: Referring Practitioner: Dr. Charley Ellis    From: Lila Camarillo PT, PT     Patient: Maria Antonia Tran       : 1945  Diagnosis: Diagnosis: R hip pain   Treatment Diagnosis:     Date: 2021    Physical Therapy Certification/Re-Certification Form  Dear Dr. Charley Ellis,  The following patient has been evaluated for physical therapy services and for therapy to continue, insurance requires physician review of the treatment plan initially and every 90 days. Please review the attached evaluation and/or summary of the patient's plan of care, and verify that you agree therapy should continue by signing the attached document and sending it back to our office. Assessment:         Plan of Care/Treatment to date:  [x] Therapeutic Exercise  [x] Modalities:  [x] Therapeutic Activity     [] Ultrasound  [] Electrical Stimulation  [] Gait Training      [] Cervical Traction [] Lumbar Traction  [x] Neuromuscular Re-education    [x] Cold/hotpack [] Iontophoresis   [x] Instruction in HEP      [] Vasopneumatic    [] Dry Needling  [x] Manual Therapy               [] Aquatic Therapy       Other:          Frequency/Duration:  # Days per week: [] 1 day # Weeks: [] 1 week [] 5 weeks     [] 2 days   [] 2 weeks [] 6 weeks     [] 3 days   [] 3 weeks [] 7 weeks     [] 4 days   [] 4 weeks [] 8 weeks         [] 9 weeks [] 10 weeks         [] 11 weeks [] 12 weeks    Rehab Potential/Progress: [] Excellent [x] Good [] Fair  [] Poor     Goals:                 Electronically signed by:  Lila Camarillo PT, DPT, CSCS 2021, 6:42 AM        If you have any questions or concerns, please don't hesitate to call.   Thank you for your referral.      Physician Signature:________________________________Date:_________ TIME: _____  By signing above, therapists plan is approved by

## 2021-06-22 NOTE — FLOWSHEET NOTE
Outpatient Physical Therapy  [facility]           [x] Phone: [phone]   Fax: [phone]  [facility] [place]           [] Phone: [phone]   Fax: [phone]        Physical Therapy Daily Treatment Note  Date:  2021    Patient Name:  Reginaldo Muller    :  1945  MRN: [id number]  Restrictions/Precautions:    Diagnosis:   Diagnosis: R hip pain  Date of Injury/Surgery:   Treatment Diagnosis:      Insurance/Certification information: PT Insurance Information: Medicare   Referring Physician:  Referring Practitioner: Dr. Anuj Alberto  Next Doctor Visit:    Plan of care signed (Y/N):  N, sent 21  Outcome Measure:   Visit# / total visits:   1/  Pain level: /10   Goals:                Summary of Evaluation:          Subjective:  See eval         Any changes in Ambulatory Summary Sheet? None        Objective:  See eval   COVID screening questions were asked and patient attested that there had been no contact or symptoms        Exercises: (No more than 4 columns)   Exercise/Equipment 21 #1 Date Date           WARM UP                     TABLE                                       STANDING                                                     PROPRIOCEPTION                                    MODALITIES                      Other Therapeutic Activities/Education:  Patient received education on their current pathology and how their condition effects them with their functional activities. Patient understood discussion and questions were answered. Patient understands their activity limitations and understands rational for treatment progression. Home Exercise Program:  HO issued, reviewed and discussed with patient. Pt agreed to comply.         Manual Treatments:        Modalities:        Communication with other providers:  eval sent 21       Assessment:  (Response towards treatment session) (Pain Rating)           Plan for Next Session:        Time In / Time Out:           If [facility] Please Indicate Time In/Out/Total Time  CPT Code Time in Time out Total Time                                                            Total for session             Timed Code/Total Treatment Minutes:        Next Progress Note due:  10th visit      Plan of Care Interventions:  [x] Therapeutic Exercise  [x] Modalities:  [x] Therapeutic Activity     [] Ultrasound  [] Estim  [] Gait Training      [] Cervical Traction [] Lumbar Traction  [x] Neuromuscular Re-education    [x] Cold/hotpack [] Iontophoresis   [x] Instruction in HEP      [] Vasopneumatic   [] Dry Needling    [x] Manual Therapy               [] Aquatic Therapy              Electronically signed by:  Palomo Darling, PT, DPT, CSCS 6/22/2021, 6:41 AM

## 2021-07-09 ENCOUNTER — HOSPITAL ENCOUNTER (OUTPATIENT)
Dept: PHYSICAL THERAPY | Age: 76
Setting detail: THERAPIES SERIES
Discharge: HOME | End: 2021-07-09
Payer: MEDICARE

## 2021-07-09 PROCEDURE — 97162 PT EVAL MOD COMPLEX 30 MIN: CPT

## 2021-07-09 PROCEDURE — 97035 APP MDLTY 1+ULTRASOUND EA 15: CPT

## 2021-07-09 PROCEDURE — 97535 SELF CARE MNGMENT TRAINING: CPT

## 2021-07-09 ASSESSMENT — PAIN DESCRIPTION - LOCATION: LOCATION: HIP

## 2021-07-09 ASSESSMENT — PAIN SCALES - GENERAL: PAINLEVEL_OUTOF10: 4

## 2021-07-09 ASSESSMENT — PAIN DESCRIPTION - DESCRIPTORS: DESCRIPTORS: SORE

## 2021-07-09 ASSESSMENT — PAIN DESCRIPTION - FREQUENCY: FREQUENCY: INTERMITTENT

## 2021-07-09 ASSESSMENT — PAIN DESCRIPTION - PAIN TYPE: TYPE: CHRONIC PAIN

## 2021-07-09 NOTE — PLAN OF CARE
Outpatient Physical Therapy        [] Phone: [phone]   Fax: [phone]   Pediatric Therapy          [] Phone: [phone]   Fax: [phone]  [facility]          [] Phone: [phone]   Fax: [phone]      To: Referring Practitioner: Dr. Rico Blackwell    From: Rita Oshea PT, PT     Patient: Eloy Lares       : 1945  Diagnosis: B trochanteric bursitis, R hip pain   Treatment Diagnosis: bilat hip pain, tight ITB, weak glutes   Date: 2021    Physical Therapy Certification/Re-Certification Form  Dear Dr. Mikel Santiago following patient has been evaluated for physical therapy services and for therapy to continue, Please review the attached evaluation and/or summary of the patient's plan of care, and verify that you agree therapy should continue by signing the attached document and sending it back to our office. Assessment:      Planned Services:  [] Therapeutic Exercise    [] Aquatics:  [] Therapeutic Activity    [] Ultrasound  [] Elec Stimulation  [] Gait Training     [] Cervical Traction [] Lumbar Traction  [] Neuromuscular Re-education [] Cold/hotpack [] Iontophoresis   [] Instruction in HEP       [] Manual Therapy     [] vasopneumatic            [] Self care home management        []Dry needling trigger point point/pain management      Plan of Care Date Range:      ? Frequency/Duration:  # Days per week: [] 1 day # Weeks: [] 1 week [] 5 weeks     [] 2 days? [] 2 weeks [] 6 weeks     [] 3 days   [] 3 weeks [] 7 weeks     [] 4 days   [] 4 weeks [] 8 weeks    Rehab Potential: [] Excellent [] Good [] Fair  [] Poor     Goals:     Long term goals  Time Frame for Long term goals : In 4 weeks, patient will  Long term goal 1: demonstrate compliance and independence w/HEP. Long term goal 2: improve HOOS score by >= 50%    Electronically signed by:  Rita Oshea PT, PT, 2021, 7:31 PM          If you have any questions or concerns, please don't hesitate to call.   Thank you for your referral.    Physician Signature:_________________Date:____________Time: ________  By signing above, therapists plan is approved by physician

## 2021-07-09 NOTE — PROGRESS NOTES
Physical Therapy  Initial Assessment  Date: 2021  Patient Name: Ishmael Sauceda  MRN: [id number]  : 1945     Treatment Diagnosis: bilat hip pain, tight ITB, weak glutes    Restrictions  Position Activity Restriction  Other position/activity restrictions: No formal restrictions    Subjective   General  Chart Reviewed: Yes  Patient assessed for rehabilitation services?: Yes  Additional Pertinent Hx: PMH:  DDD spine, HTN, trochanteric bursitis, bilat RC surgery, B TKA  Family / Caregiver Present: No  Referring Practitioner: Dr. Veronica Adams  Referral Date : 21  Diagnosis: B trochanteric bursitis, R hip pain  Follows Commands: Within Functional Limits  PT Visit Information  Onset Date:  (chronic approx 6-8 mo)  PT Insurance Information: Medicare  Total # of Visits Approved:  (BOMN)  Subjective  Subjective: Patient reports noticing bilat hip and low back soreness approx 6-8 mo ago and did not get better. Was diagnosed w/bilat trochanteric bursisits and given steroid injections on 21 w/good result. Today, having no pain and is sleeping w/o difficulty or waking d/t back or hip pain. PLOF:  Ind. w/adls, mobility, ambulates w/o AD, homemaking, community activities, plays golf. WORSE:  up/down stairs, doing house work, getting up from chair, sleeping, golf  BETTER:  Aleve and then steroid injections resolved pain.   Pain Screening  Patient Currently in Pain: No  Pain Assessment  Pain Assessment: 0-10  Pain Level: 4 (2-3/10 R hip, 4/10 L hip w/palpation)  Patient's Stated Pain Goal: No pain  Pain Type: Chronic pain  Pain Location: Hip  Pain Descriptors: Sore  Pain Frequency: Intermittent  Vital Signs  Patient Currently in Pain: No    Vision/Hearing  Vision  Vision: Impaired (left eye contact)  Hearing  Hearing: Within functional limits    Orientation  Orientation  Overall Orientation Status: Within Normal Limits    Social/Functional History  Social/Functional History  Lives With: Significant other  Type of Home: House  Home Layout: Multi-level (6 up/down)  Home Access: Stairs to enter with rails  Entrance Stairs - Number of Steps: 1  Entrance Stairs - Rails: Both  Home Equipment: Cane;Rolling walker;4 wheeled walker (Was for her spouse)  ADL Assistance: [address]: Independent  Homemaking Responsibilities: Yes  Ambulation Assistance: Independent  Transfer Assistance: Independent  Active : Yes  Mode of Transportation: Car  Occupation: Retired  Type of occupation:   Leisure & Hobbies: golf    Objective     Observation/Palpation  Posture: Fair  Palpation: TTP bilat greater trochanter (posterolateral) L>R    AROM RLE (degrees)  RLE AROM: WFL  PROM LLE (degrees)  LLE General PROM: Pain and guarding w/FABIR  AROM LLE (degrees)  LLE AROM : WFL  Spine  Lumbar: FF 95 deg, EXT 20 deg    Strength RLE  Comment: hip 4/5, knee 5/5  Strength LLE  Comment: hip 4/5, knee 5/5     Additional Measures  Flexibility: tight ITB bilat  Special Tests: APARNA/FADIR (-) R; (+) L  Sensation  Overall Sensation Status: WFL     Transfers  Sit to Stand: Modified independent  Stand to sit: Modified independent       Ambulation 1  Surface: level tile  Device: No Device  Quality of Gait: Non-antalgic gait  Distance: 100 ft                            Assessment   Conditions Requiring Skilled Therapeutic Intervention  Body structures, Functions, Activity limitations: Decreased functional mobility ; Decreased ROM; Decreased strength; Increased pain  Assessment: Patient reports noticing bilat hip and low back soreness approx 6-8 mo ago and did not get better. Was diagnosed w/bilat trochanteric bursisits and given steroid injections on 6/03/21 w/good result. Today, having no pain and is sleeping w/o difficulty or waking d/t back or hip pain. PLOF:  Ind. w/adls, mobility, ambulates w/o AD, homemaking, community activities, plays golf.   WORSE:  up/down stairs, doing house work, getting up from chair, sleeping, golf BETTER:  Aleve and then steroid injections resolved pain. Today, patient presents w/symptoms consistent w/greater trochanteric bursitis, shortened/tight ITBs bilat, gluteal weakness and will benefit from physical therapy. Treatment Diagnosis: bilat hip pain, tight ITB, weak glutes  Prognosis: Good  Decision Making: Medium Complexity  History: PMH:  DDD spine, HTN, trochanteric bursitis, bilat RC surgery, B TKA  Exam: MMT, ROM, palpation, gait  Clinical Presentation: Medium  Barriers to Learning: None  REQUIRES PT FOLLOW UP: Yes  Treatment Initiated : yes  Activity Tolerance  Activity Tolerance: Patient Tolerated treatment well         Plan   Plan  Times per week: 3  Plan weeks: 4  Specific instructions for Next Treatment: phonophoresis bilat hips (trochanteric bursitis), roll ITB's, initial gluteal strengthening, adductor sets, ed, HEP  Current Treatment Recommendations: Strengthening, Neuromuscular Re-education, Home Exercise Program, Manual Therapy - Soft Tissue Mobilization, Patient/Caregiver Education & Training, Modalities, Pain Management    G-Code       OutComes Score                                                  AM-PAC Score             Goals  Long term goals  Time Frame for Long term goals : In 4 weeks, patient will  Long term goal 1: demonstrate compliance and independence w/HEP.   Long term goal 2: improve HOOS score by >= 50%  Patient Goals   Patient goals : No hip pain w/activity or sleeping       Therapy Time   Individual Concurrent Group Co-treatment   Time In 1025         Time Out 1120         Minutes 55         Timed Code Treatment Minutes: [address], PT

## 2021-07-09 NOTE — FLOWSHEET NOTE
Outpatient Physical Therapy  [place]           [x] Phone: [phone]   Fax: [phone]  [facility]           [] Phone: [phone]   Fax: [phone]        Physical Therapy Daily Treatment Note  Date:  2021    Patient Name:  Gavino Zaidi    :  1945  MRN: [id number]  Restrictions/Precautions: Other position/activity restrictions: No formal restrictions  Diagnosis:   Diagnosis: B trochanteric bursitis, R hip pain  Date of Injury/Surgery: chronic x 8 mo approx  Treatment Diagnosis: Treatment Diagnosis: bilat hip pain, tight ITB, weak glutes    Insurance/Certification information: PT Insurance Information: Medicare   Referring Physician:  Referring Practitioner: Dr. Rom Kahn  Next Doctor Visit:  Kylie Fiore of care signed (Y/N):  Pending  Outcome Measure:   Visit# / total visits:  1 /  Pain level: 3/10 R hip, 4/10 L hip   POC Date Range 21 - 21    Goals:          Long term goals  Time Frame for Long term goals : In 4 weeks, patient will  Long term goal 1: demonstrate compliance and independence w/HEP. Long term goal 2: improve HOOS score by >= 50%    Summary of Evaluation: Assessment: Patient reports noticing bilat hip and low back soreness approx 6-8 mo ago and did not get better. Was diagnosed w/bilat trochanteric bursisits and given steroid injections on 21 w/good result. Today, having no pain and is sleeping w/o difficulty or waking d/t back or hip pain. PLOF:  Ind. w/adls, mobility, ambulates w/o AD, homemaking, community activities, plays golf. WORSE:  up/down stairs, doing house work, getting up from chair, sleeping, golf  BETTER:  Aleve and then steroid injections resolved pain. Today, patient presents w/symptoms consistent w/greater trochanteric bursitis, shortened/tight ITBs bilat, gluteal weakness and will benefit from physical therapy. Subjective:  See eval         Any changes in Ambulatory Summary Sheet?   None        Objective:  See eval     COVID screening questions were asked and patient attested that there had been no contact or symptoms    Exercises: (No more than 4 columns)   Exercise/Equipment Date 7/13/21 #1 Date Date           WARM UP                     TABLE      Hip adduction seng w/ball 1 x 10                                STANDING                                                     PROPRIOCEPTION                                    MODALITIES US                     Other Therapeutic Activities/Education:    POC and treatment progression expected reviewed w/patient. Explained to patient that exercise will be a component of her therapy, in conjunction w/phonophoresis. Home Exercise Program:    Hip add seng    Manual Treatments:    NO    Modalities:    US phonophoresis w/patient's medication supplied. 10' ea bilat posterolateral greater trochanters  1.0 Hz  1.5 w/cm2    No adverse response/tolerated well. Communication with other providers:    POC faxed to ordering provider. Assessment:  (Response towards treatment session) (Pain Rating)  Pain end of session = no change    Assessment: Patient reports noticing bilat hip and low back soreness approx 6-8 mo ago and did not get better. Was diagnosed w/bilat trochanteric bursisits and given steroid injections on 6/03/21 w/good result. Today, having no pain and is sleeping w/o difficulty or waking d/t back or hip pain. PLOF:  Ind. w/adls, mobility, ambulates w/o AD, homemaking, community activities, plays golf. WORSE:  up/down stairs, doing house work, getting up from chair, sleeping, golf  BETTER:  Aleve and then steroid injections resolved pain. Today, patient presents w/symptoms consistent w/greater trochanteric bursitis, shortened/tight ITBs bilat, gluteal weakness and will benefit from physical therapy.       Plan for Next Session: Specific instructions for Next Treatment: phonophoresis bilat hips (trochanteric bursitis), roll ITB's, initial gluteal strengthening, adductor sets, ed, HEP      Time In / Time Out:     1025/1120    If [facility] Please Indicate Time In/Out  CPT Code Time in Time out                                                              Timed Code/Total Treatment Minutes:   40'/55'  US 25' (2), ADL 15' (1)      Next Progress Note due:  8/08/21      Plan of Care Interventions:  [x] Therapeutic Exercise  [] Modalities:  [x] Therapeutic Activity     [x] Ultrasound  [] Estim  [] Gait Training      [] Cervical Traction [] Lumbar Traction  [x] Neuromuscular Re-education    [] Cold/hotpack [] Iontophoresis   [x] Instruction in HEP      [] Vasopneumatic   [] Dry Needling    [] Manual Therapy               [] Aquatic Therapy              Electronically signed by:  Gaudencio Dubose PT, 7/9/2021, 7:32 PM

## 2021-07-13 NOTE — PLAN OF CARE
management        []Dry needling trigger point point/pain management      Plan of Care Date Range:  7/09/21 - 8/08/21    ? Frequency/Duration:  # Days per week: [] 1 day # Weeks: [] 1 week [] 5 weeks     [] 2 days? [] 2 weeks [] 6 weeks     [x] 3 days   [] 3 weeks [] 7 weeks     [] 4 days   [x] 4 weeks [] 8 weeks    Rehab Potential: [] Excellent [x] Good [] Fair  [] Poor     Goals:     Long term goals  Time Frame for Long term goals : In 4 weeks, patient will  Long term goal 1: demonstrate compliance and independence w/HEP. Long term goal 2: improve HOOS score by >= 50%    Electronically signed by:  Shala Morgan, PT 7/13/2021, 7:02 PM          If you have any questions or concerns, please don't hesitate to call.   Thank you for your referral.    Physician Signature:_________________Date:____________Time: ________  By signing above, therapists plan is approved by physician

## 2021-07-16 ENCOUNTER — HOSPITAL ENCOUNTER (OUTPATIENT)
Dept: PHYSICAL THERAPY | Age: 76
Setting detail: THERAPIES SERIES
Discharge: HOME | End: 2021-07-16
Payer: MEDICARE

## 2021-07-16 PROCEDURE — 97110 THERAPEUTIC EXERCISES: CPT

## 2021-07-16 PROCEDURE — 97035 APP MDLTY 1+ULTRASOUND EA 15: CPT

## 2021-07-16 NOTE — FLOWSHEET NOTE
Outpatient Physical Therapy  [place]           [x] Phone: [phone]   Fax: [phone]  [facility]arly[facility] Buer[facility]           [] Phone: [phone]   Fax: [phone]        Physical Therapy Daily Treatment Note  Date:  2021    Patient Name:  Disha Santos    :  1945  MRN: [id number]  Restrictions/Precautions: Other position/activity restrictions: No formal restrictions  Diagnosis:   Diagnosis: B trochanteric bursitis, R hip pain  Date of Injury/Surgery: chronic x 8 mo approx  Treatment Diagnosis: Treatment Diagnosis: bilat hip pain, tight ITB, weak glutes    Insurance/Certification information: PT Insurance Information: Medicare   Referring Physician:  Referring Practitioner: Dr. Bill Rain  Next Doctor Visit:  Brit Person of care signed (Y/N):  Pending  Outcome Measure:   Visit# / total visits:    Pain level: 0/10 Bilateral hips, L knee pain, distal ITB region 3/10   POC Date Range 21 - 21    Goals:          Long term goals  Time Frame for Long term goals : In 4 weeks, patient will  Long term goal 1: demonstrate compliance and independence w/HEP. Reports compliance   Long term goal 2: improve HOOS score by >= 50%    Summary of Evaluation: Assessment: Patient reports noticing bilat hip and low back soreness approx 6-8 mo ago and did not get better. Was diagnosed w/bilat trochanteric bursisits and given steroid injections on 21 w/good result. Today, having no pain and is sleeping w/o difficulty or waking d/t back or hip pain. PLOF:  Ind. w/adls, mobility, ambulates w/o AD, homemaking, community activities, plays golf. WORSE:  up/down stairs, doing house work, getting up from chair, sleeping, golf  BETTER:  Aleve and then steroid injections resolved pain. Today, patient presents w/symptoms consistent w/greater trochanteric bursitis, shortened/tight ITBs bilat, gluteal weakness and will benefit from physical therapy.         Subjective:  Theone Living arrives to therapy stating that the hips feel pretty good today, no pain. Has some lateral knee pain on the L. Has been doing her HEP and has no complaints with this so far. Any changes in Ambulatory Summary Sheet? None        Objective:   COVID screening questions were asked and patient attested that there had been no contact or symptoms        Exercises: (No more than 4 columns)   Exercise/Equipment Date 7/13/21 #1 7/16/2021 #2 Date           WARM UP                     TABLE      Hip adduction seng w/ball 1 x 10 10*3\"    Bridges   10*3\"                         STANDING      Hip abduction   10* ea                                              PROPRIOCEPTION                                    MODALITIES US US                     Other Therapeutic Activities/Education:    POC and treatment progression expected reviewed w/patient. Explained to patient that exercise will be a component of her therapy, in conjunction w/phonophoresis. Home Exercise Program:  Hip adductor squeeze, bridges, hip abduction in standing       Manual Treatments:  ITB rolling next session. Modalities:    US phonophoresis w/patient's medication supplied. 10' ea bilat greater trochanters  1.0 mHz  1.5 w/cm2  50%     No adverse response/tolerated well. Communication with other providers:        Assessment:  Saskia tolerated today's session well and reported a slight decrease in pain in the knee after activities in the clinic. She remains with mild tenderness to palpation at both GT's. She tolerated initial glut strengthening exercises without aggravation of symptoms.  End session pain: 2/10 L lateral knee       Plan for Next Session:  phonophoresis bilat hips (trochanteric bursitis), roll ITB's, initial gluteal strengthening, adductor sets, ed, HEP      Time In / Time Out:     7503/6892       Timed Code/Total Treatment Minutes:   44' 2 US 25' (including set up) 1 TE 14'      Next Progress Note due:  8/08/21      Plan of Care Interventions:  [x] Therapeutic

## 2021-07-21 ENCOUNTER — HOSPITAL ENCOUNTER (OUTPATIENT)
Dept: PHYSICAL THERAPY | Age: 76
Setting detail: THERAPIES SERIES
Discharge: HOME | End: 2021-07-21
Payer: MEDICARE

## 2021-07-21 PROCEDURE — 97035 APP MDLTY 1+ULTRASOUND EA 15: CPT

## 2021-07-21 NOTE — FLOWSHEET NOTE
Outpatient Physical Therapy  [place]           [x] Phone: [phone]   Fax: [phone]  [place]            [] Phone: [phone]   Fax: [phone]        Physical Therapy Daily Treatment Note  Date:  2021    Patient Name:  Lachelle Beaver    :  1945  MRN: [id number]  Restrictions/Precautions: Other position/activity restrictions: No formal restrictions  Diagnosis:   Diagnosis: B trochanteric bursitis, R hip pain  Date of Injury/Surgery: chronic x 8 mo approx  Treatment Diagnosis: Treatment Diagnosis: bilat hip pain, tight ITB, weak glutes    Insurance/Certification information: PT Insurance Information: Medicare   Referring Physician:  Referring Practitioner: Dr. Tessie Mike  Next Doctor Visit:  Wendy Mackay of [facility] signed (Y/N):  Pending - resent   Outcome Measure:   Visit# / total visits:  3/8  Pain level: 0/10 Bilateral hips, L knee pain, distal ITB region 3/10   POC Date Range 21 - 21    Goals:     Long term goals  Time Frame for Long term goals : In 4 weeks, patient will  Long term goal 1: demonstrate compliance and independence w/HEP. Reports compliance   Long term goal 2: improve HOOS score by >= 50%    Summary of Evaluation: Assessment: Patient reports noticing bilat hip and low back soreness approx 6-8 mo ago and did not get better. Was diagnosed w/bilat trochanteric bursisits and given steroid injections on 21 w/good result. Today, having no pain and is sleeping w/o difficulty or waking d/t back or hip pain. PLOF:  Ind. w/adls, mobility, ambulates w/o AD, homemaking, community activities, plays golf. WORSE:  up/down stairs, doing house work, getting up from chair, sleeping, golf  BETTER:  Aleve and then steroid injections resolved pain. Today, patient presents w/symptoms consistent w/greater trochanteric bursitis, shortened/tight ITBs bilat, gluteal weakness and will benefit from physical therapy.         Subjective:   Thaddeus Brake arrives to therapy stating Traction  [x] Neuromuscular Re-education    [] Cold/hotpack [] Iontophoresis   [x] Instruction in HEP      [] Vasopneumatic   [] Dry Needling    [] Manual Therapy               [] Aquatic Therapy              Electronically signed by:  Deshawn Sequeira     7/21/2021, 10:40 AM

## 2021-07-23 ENCOUNTER — HOSPITAL ENCOUNTER (OUTPATIENT)
Dept: PHYSICAL THERAPY | Age: 76
Setting detail: THERAPIES SERIES
Discharge: HOME | End: 2021-07-23
Payer: MEDICARE

## 2021-07-23 PROCEDURE — 97535 SELF CARE MNGMENT TRAINING: CPT

## 2021-07-23 PROCEDURE — 97035 APP MDLTY 1+ULTRASOUND EA 15: CPT

## 2021-07-23 PROCEDURE — 97140 MANUAL THERAPY 1/> REGIONS: CPT

## 2021-07-23 NOTE — FLOWSHEET NOTE
Outpatient Physical Therapy  [place]           [x] Phone: [phone]   Fax: [phone]  [facility] [place]           [] Phone: [phone]   Fax: [phone]        Physical Therapy Daily Treatment Note  Date:  2021    Patient Name:  Mauricio Rowe    :  1945  MRN: [id number]  Restrictions/Precautions: Other position/activity restrictions: No formal restrictions  Diagnosis:   Diagnosis: B trochanteric bursitis, R hip pain  Date of Injury/Surgery: chronic x 8 mo approx  Treatment Diagnosis: Treatment Diagnosis: bilat hip pain, tight ITB, weak glutes    Insurance/Certification information: PT Insurance Information: Medicare   Referring Physician:  Referring Practitioner: Dr. Elsy Aparicio  Next Doctor Visit:  Carlotta Noriega signed (Y/N):  Pending - resent   Outcome Measure:   Visit# / total visits:    Pain level: 2/10 L knee, 0/10 B Hips. POC Date Range 21 - 21    Goals:     Long term goals  Time Frame for Long term goals : In 4 weeks, patient will  Long term goal 1: demonstrate compliance and independence w/HEP. Reports compliance   Long term goal 2: improve HOOS score by >= 50%    Summary of Evaluation: Assessment: Patient reports noticing bilat hip and low back soreness approx 6-8 mo ago and did not get better. Was diagnosed w/bilat trochanteric bursisits and given steroid injections on 21 w/good result. Today, having no pain and is sleeping w/o difficulty or waking d/t back or hip pain. PLOF:  Ind. w/adls, mobility, ambulates w/o AD, homemaking, community activities, plays golf. WORSE:  up/down stairs, doing house work, getting up from chair, sleeping, golf  BETTER:  Aleve and then steroid injections resolved pain. Today, patient presents w/symptoms consistent w/greater trochanteric bursitis, shortened/tight ITBs bilat, gluteal weakness and will benefit from physical therapy.         Subjective:  Antonella Nguyễn arrives to therapy stating that the hips and knee are feeling better. Feels like therapy is improving her symptoms, gauges it by her knee, distal ITB region. Her hips mostly only hurt when she pushes on it or lays on it. Any changes in Ambulatory Summary Sheet? None        Objective:   COVID screening questions were asked and patient attested that there had been no contact or symptoms    Tight/tender entire length of ITB on the L. Exercises: (No more than 4 columns)   Exercise/Equipment 7/16/2021 #2 7/21/2021 #3 7/23/2021 #4           WARM UP                     TABLE      Hip adduction seng w/ball 10*3\" - -   Bridges  10*3\" - -                        STANDING      Hip abduction  10* ea  - -                                            [id number] [facility]                   Other Therapeutic Activities/Education:    POC and treatment progression expected reviewed w/patient. Explained to patient that exercise will be a component of her therapy, in conjunction w/phonophoresis. Home Exercise Program:  Hip adductor squeeze, bridges, hip abduction in standing - reviewed and gave new print out       Manual Treatments:  STM/MFR/TPR to L ITB with patient in R S/L with pillow between the knees       Modalities:    US phonophoresis w/patient's medication supplied. 8' ea bilat greater trochanters  1.0 mHz  1.5 w/cm2  50%     CP to L hip and lateral thigh for 10' post massage for reduction of pain and irritation. Skin was checked and there was no adverse reaction. No adverse response/tolerated well. Communication with other providers:        Assessment: Saskia tolerated today's session well. She appears to be doing well overall; however, does have multiple tight/tender spots throughout the length of her L ITB. She will continue to benefit from skilled PT services in order to reduce tissue tension/knots and reduce pain so she may return to her PLOF. End session pain: 0/10 just a little tender from massage.        Plan for Next Session:  phonophoresis bilat hips (trochanteric bursitis), roll ITB's, initial gluteal strengthening, adductor sets, ed, HEP      Time In / Time Out:    0915/1000      Timed Code/Total Treatment Minutes:   39' 1 US 16' 1 ADL 10' 1 man 23'      Next Progress Note due:  8/08/21      Plan of Care Interventions:  [x] Therapeutic Exercise  [] Modalities:  [x] Therapeutic Activity     [x] Ultrasound  [] Estim  [] Gait Training      [] Cervical Traction [] Lumbar Traction  [x] Neuromuscular Re-education    [] Cold/hotpack [] Iontophoresis   [x] Instruction in HEP      [] Vasopneumatic   [] Dry Needling    [] Manual Therapy               [] Aquatic Therapy              Electronically signed by:  Malissa Oneal     7/23/2021, 6:46 AM

## 2021-07-28 ENCOUNTER — HOSPITAL ENCOUNTER (OUTPATIENT)
Dept: PHYSICAL THERAPY | Age: 76
Setting detail: THERAPIES SERIES
Discharge: HOME | End: 2021-07-28
Payer: MEDICARE

## 2021-07-28 PROCEDURE — 97035 APP MDLTY 1+ULTRASOUND EA 15: CPT

## 2021-07-28 PROCEDURE — 97140 MANUAL THERAPY 1/> REGIONS: CPT

## 2021-07-28 NOTE — FLOWSHEET NOTE
Outpatient Physical Therapy  [place]           [x] Phone: [phone]   Fax: [phone]  Mike Harrison           [] Phone: [phone]   Fax: [phone]        Physical Therapy Daily Treatment Note  Date:  2021    Patient Name:  Wendelin Fothergill    :  1945  MRN: [id number]  Restrictions/Precautions: Other position/activity restrictions: No formal restrictions  Diagnosis:   Diagnosis: B trochanteric bursitis, R hip pain  Date of Injury/Surgery: chronic x 8 mo approx  Treatment Diagnosis: Treatment Diagnosis: bilat hip pain, tight ITB, weak glutes    Insurance/Certification information: PT Insurance Information: Medicare   Referring Physician:  Referring Practitioner: Dr. Raman Smith  Next Doctor Visit:  Lore Fitch of care signed (Y/N):  Pending - resent   Outcome Measure:   Visit# / total visits:    Pain level: 2-3/10 L knee, 0/10 B Hips. POC Date Range 21 - 21    Goals:     Long term goals  Time Frame for Long term goals : In 4 weeks, patient will  Long term goal 1: demonstrate compliance and independence w/HEP. Reports compliance   Long term goal 2: improve HOOS score by >= 50%    Summary of Evaluation: Assessment: Patient reports noticing bilat hip and low back soreness approx 6-8 mo ago and did not get better. Was diagnosed w/bilat trochanteric bursisits and given steroid injections on 21 w/good result. Today, having no pain and is sleeping w/o difficulty or waking d/t back or hip pain. PLOF:  Ind. w/adls, mobility, ambulates w/o AD, homemaking, community activities, plays golf. WORSE:  up/down stairs, doing house work, getting up from chair, sleeping, golf  BETTER:  Aleve and then steroid injections resolved pain. Today, patient presents w/symptoms consistent w/greater trochanteric bursitis, shortened/tight ITBs bilat, gluteal weakness and will benefit from physical therapy.         Subjective:  Emma Orourke arrives to therapy stating that the hips and knee maybe feel a little bit better but some days are just better than others. Felt like the massage last week might have helped a little but still having pain in the knee. Any changes in Ambulatory Summary Sheet? None        Objective:   COVID screening questions were asked and patient attested that there had been no contact or symptoms    Remains tight and painful with massage to the distal and middle ITB, does decrease some with soft tissue work. Exercises: (No more than 4 columns)   Exercise/Equipment 7/16/2021 #2 7/21/2021 #3 7/23/2021 #4 7/28/2021 #5            WARM UP                      TABLE       Hip adduction seng w/ball 10*3\" - -    Bridges  10*3\" - -    Sit-Stand with band around the knees        Iso hip abduction into therapists hands                  STANDING       Hip abduction  10* ea  - -                                                   [facility]                     Other Therapeutic Activities/Education:    POC and treatment progression expected reviewed w/patient. Explained to patient that exercise will be a component of her therapy, in conjunction w/phonophoresis. 7/28: Discussed positions to avoid to decrease tension and stress on ITB (ie: crossing legs and ankles, sleep with pillow between the knees)    Home Exercise Program:  Hip adductor squeeze, bridges, hip abduction in standing - reviewed and gave new print out       Manual Treatments:  STM/MFR/TPR to L ITB with patient in R S/L with pillow between the knees       Modalities:    US phonophoresis w/patient's medication supplied. 8' ea bilat greater trochanters  1.0 mHz  1.5 w/cm2  50%     CP to L hip and lateral thigh for 10' post massage for reduction of pain and irritation. Skin was checked and there was no adverse reaction. No adverse response/tolerated well. Communication with other providers:        Assessment:  Saskia tolerated today's session well.  She continues to report moderate pain along the length of the ITB and is quite tight and tender in this area. She will continue to benefit from a few more sessions of PT to assess full benefit of modalities and manual interventions being used and to progress HEP for continued symptom management after PT.  End session pain: 2-3/10    Plan for Next Session:  phonophoresis bilat hips (trochanteric bursitis), roll ITB's, initial gluteal strengthening, adductor sets, ed, HEP      Time In / Time Out:    1115/1203      Timed Code/Total Treatment Minutes:   38'/48' 2 US 20' 1 man 25'      Next Progress Note due:  8/08/21      Plan of Care Interventions:  [x] Therapeutic Exercise  [] Modalities:  [x] Therapeutic Activity     [x] Ultrasound  [] Estim  [] Gait Training      [] Cervical Traction [] Lumbar Traction  [x] Neuromuscular Re-education    [] Cold/hotpack [] Iontophoresis   [x] Instruction in HEP      [] Vasopneumatic   [] Dry Needling    [] Manual Therapy               [] Aquatic Therapy              Electronically signed by:  Chitra Harrison     7/28/2021, 10:39 AM

## 2021-07-30 ENCOUNTER — HOSPITAL ENCOUNTER (OUTPATIENT)
Dept: PHYSICAL THERAPY | Age: 76
Setting detail: THERAPIES SERIES
Discharge: HOME | End: 2021-07-30
Payer: MEDICARE

## 2021-07-30 DIAGNOSIS — M54.16 LEFT LUMBAR RADICULOPATHY: ICD-10-CM

## 2021-07-30 PROCEDURE — 97140 MANUAL THERAPY 1/> REGIONS: CPT

## 2021-07-30 PROCEDURE — 97110 THERAPEUTIC EXERCISES: CPT

## 2021-07-30 RX ORDER — CELECOXIB 200 MG/1
200 CAPSULE ORAL EVERY MORNING
Qty: 90 CAPSULE | Refills: 0 | Status: SHIPPED | OUTPATIENT
Start: 2021-07-30 | End: 2021-08-25

## 2021-07-30 NOTE — FLOWSHEET NOTE
Outpatient Physical Therapy  [place]           [x] Phone: [phone]   Fax: [phone]  Brenda huber           [] Phone: [phone]   Fax: [phone]        Physical Therapy Daily Treatment Note  Date:  2021    Patient Name:  Abigail Brown    :  1945  MRN: [id number]  Restrictions/Precautions: Other position/activity restrictions: No formal restrictions  Diagnosis:   Diagnosis: B trochanteric bursitis, R hip pain  Date of Injury/Surgery: chronic x 8 mo approx  Treatment Diagnosis: Treatment Diagnosis: bilat hip pain, tight ITB, weak glutes    Insurance/Certification information: PT Insurance Information: Medicare   Referring Physician:  Referring Practitioner: Dr. Elian Zambrano  Next Doctor Visit:  Shruthi Casarez of care signed (Y/N):  Pending - resent   Outcome Measure:   Visit# / total visits:  8  Pain level: 0/10  POC Date Range 21 - 21    Goals:     Long term goals  Time Frame for Long term goals : In 4 weeks, patient will  Long term goal 1: demonstrate compliance and independence w/HEP. Reports compliance   Long term goal 2: improve HOOS score by >= 50%    Summary of Evaluation: Assessment: Patient reports noticing bilat hip and low back soreness approx 6-8 mo ago and did not get better. Was diagnosed w/bilat trochanteric bursisits and given steroid injections on 21 w/good result. Today, having no pain and is sleeping w/o difficulty or waking d/t back or hip pain. PLOF:  Ind. w/adls, mobility, ambulates w/o AD, homemaking, community activities, plays golf. WORSE:  up/down stairs, doing house work, getting up from chair, sleeping, golf  BETTER:  Aleve and then steroid injections resolved pain. Today, patient presents w/symptoms consistent w/greater trochanteric bursitis, shortened/tight ITBs bilat, gluteal weakness and will benefit from physical therapy. Subjective:  Anna Saleh arrives to therapy stating that there is no pain at all today!  Not in either of her hips or in her knee. She forgot her medicine today. Any changes in Ambulatory Summary Sheet? None        Objective:   COVID screening questions were asked and patient attested that there had been no contact or symptoms    Cues for LE positioning during sit-stand exercise. Exercises: (No more than 4 columns)   Exercise/Equipment 7/28/2021 #5 7/30/2021 #6          WARM UP     Nu-step   L4 5'         TABLE     Hip adduction seng w/ball  10*5\"   Bridges   With RTB for abduction 2*10   Sit-Stand with band around the knees   RTB 2*10   Iso hip abduction into RTB  5*10\"           STANDING     Hip abduction   15* ea LE   Lateral band walk band at knees   2*10 steps ea dir with RTB                                  PROPRIOCEPTION                              MODALITIES US CP                 Other Therapeutic Activities/Education:    POC and treatment progression expected reviewed w/patient. Explained to patient that exercise will be a component of her therapy, in conjunction w/phonophoresis. 7/28: Discussed positions to avoid to decrease tension and stress on ITB (ie: crossing legs and ankles, sleep with pillow between the knees)    Home Exercise Program:  Hip adductor squeeze, bridges, hip abduction in standing - reviewed and gave new print out       Manual Treatments:  STM/MFR/TPR to L ITB with patient in R S/L with pillow between the knees, fibular head mobilizations on the R        Modalities:        CP to L hip and lateral thigh for 10' post massage for reduction of pain and irritation. Skin was checked and there was no adverse reaction. No adverse response/tolerated well. Communication with other providers:        Assessment:  Burak Newman has shown great improvement in the last few visits with decreased pain and tightness following soft tissue work. She forgot her medication this visit so we focused on strengthening exercises and soft tissue work to see how she would tolerate it.  End session pain:

## 2021-08-02 ENCOUNTER — HOSPITAL ENCOUNTER (OUTPATIENT)
Age: 76
Discharge: HOME | End: 2021-08-02
Payer: MEDICARE

## 2021-08-02 LAB
ALBUMIN SERPL-MCNC: 4.2 GM/DL (ref 3.4–5)
ALP BLD-CCNC: 95 IU/L (ref 40–128)
ALT SERPL-CCNC: 13 U/L (ref 10–40)
ANION GAP SERPL CALCULATED.3IONS-SCNC: 9 MMOL/L (ref 4–16)
AST SERPL-CCNC: 17 IU/L (ref 15–37)
BACTERIA: NEGATIVE /HPF
BILIRUB SERPL-MCNC: 0.6 MG/DL (ref 0–1)
BILIRUBIN URINE: NEGATIVE MG/DL
BLOOD, URINE: NEGATIVE
BUN BLDV-MCNC: 22 MG/DL (ref 6–23)
CALCIUM SERPL-MCNC: 9.1 MG/DL (ref 8.3–10.6)
CHLORIDE BLD-SCNC: 103 MMOL/L (ref 99–110)
CLARITY: CLEAR
CO2: 28 MMOL/L (ref 21–32)
COLOR: YELLOW
CREAT SERPL-MCNC: 1 MG/DL (ref 0.6–1.1)
GFR AFRICAN AMERICAN: >60 ML/MIN/1.73M2
GFR NON-AFRICAN AMERICAN: 54 ML/MIN/1.73M2
GLUCOSE BLD-MCNC: 90 MG/DL (ref 70–99)
GLUCOSE, URINE: NEGATIVE MG/DL
KETONES, URINE: NEGATIVE MG/DL
LEUKOCYTE ESTERASE, URINE: NEGATIVE
MUCUS: ABNORMAL HPF
NITRITE URINE, QUANTITATIVE: NEGATIVE
PH, URINE: 6 (ref 5–8)
POTASSIUM SERPL-SCNC: 4.1 MMOL/L (ref 3.5–5.1)
PROTEIN UA: NEGATIVE MG/DL
RBC URINE: ABNORMAL /HPF (ref 0–6)
SODIUM BLD-SCNC: 140 MMOL/L (ref 135–145)
SPECIFIC GRAVITY UA: 1.02 (ref 1–1.03)
SQUAMOUS EPITHELIAL: 1 /HPF
TOTAL PROTEIN: 6.5 GM/DL (ref 6.4–8.2)
TRICHOMONAS: ABNORMAL /HPF
UROBILINOGEN, URINE: NEGATIVE MG/DL (ref 0.2–1)
WBC UA: 1 /HPF (ref 0–5)

## 2021-08-02 PROCEDURE — 36415 COLL VENOUS BLD VENIPUNCTURE: CPT

## 2021-08-02 PROCEDURE — 81001 URINALYSIS AUTO W/SCOPE: CPT

## 2021-08-02 PROCEDURE — 86430 RHEUMATOID FACTOR TEST QUAL: CPT

## 2021-08-02 PROCEDURE — 80053 COMPREHEN METABOLIC PANEL: CPT

## 2021-08-02 PROCEDURE — 86038 ANTINUCLEAR ANTIBODIES: CPT

## 2021-08-03 LAB — RHEUMATOID FACTOR: <10 IU/ML (ref 0–14)

## 2021-08-04 LAB — ANTI-NUCLEAR ANTIBODY (ANA): NORMAL

## 2021-08-05 ENCOUNTER — HOSPITAL ENCOUNTER (OUTPATIENT)
Dept: PHYSICAL THERAPY | Age: 76
Setting detail: THERAPIES SERIES
Discharge: HOME | End: 2021-08-05
Payer: MEDICARE

## 2021-08-05 PROCEDURE — 97035 APP MDLTY 1+ULTRASOUND EA 15: CPT

## 2021-08-05 NOTE — FLOWSHEET NOTE
Outpatient Physical Therapy  [place]           [x] Phone: [phone]   Fax: [phone]  [place]           [] Phone: [phone]   Fax: [phone]        Physical Therapy Daily Treatment Note  Date:  2021    Patient Name:  Aimee Frost    :  1945  MRN: [id number]  Restrictions/Precautions: Other position/activity restrictions: No formal restrictions  Diagnosis:   Diagnosis: B trochanteric bursitis, R hip pain  Date of Injury/Surgery: chronic x 8 mo approx  Treatment Diagnosis: Treatment Diagnosis: bilat hip pain, tight ITB, weak glutes    Insurance/Certification information: PT Insurance Information: Medicare   Referring Physician:  Referring Practitioner: Dr. Eric Yu  Next Doctor Visit:  [facility] signed (Y/N):  Pending - resent   Outcome Measure:   Visit# / total visits:    Pain level: 0/10 R hip, 1-2 L hip and distal knee (latent)  POC Date Range 21 - 21    Goals:     Long term goals  Time Frame for Long term goals : In 4 weeks, patient will  Long term goal 1: demonstrate compliance and independence w/HEP. Reports compliance   Long term goal 2: improve HOOS score by >= 50%    Summary of Evaluation: Assessment: Patient reports noticing bilat hip and low back soreness approx 6-8 mo ago and did not get better. Was diagnosed w/bilat trochanteric bursisits and given steroid injections on 21 w/good result. Today, having no pain and is sleeping w/o difficulty or waking d/t back or hip pain. PLOF:  Ind. w/adls, mobility, ambulates w/o AD, homemaking, community activities, plays golf. WORSE:  up/down stairs, doing house work, getting up from chair, sleeping, golf  BETTER:  Aleve and then steroid injections resolved pain. Today, patient presents w/symptoms consistent w/greater trochanteric bursitis, shortened/tight ITBs bilat, gluteal weakness and will benefit from physical therapy. Subjective:    [place] feels she is improving.  No pain R hip.  Some discomfort L hip and knee. Has one more visit. Ind. W/HEP. Plays golf 3x/wk. Any changes in Ambulatory Summary Sheet? None        Objective:   COVID screening questions were asked and patient attested that there had been no contact or symptoms    TTP left greater trochanter/ITB posterolaterally and at insertion distal knee. Exercises: (No more than 4 columns)   Exercise/Equipment 7/28/2021 #5 7/30/2021 #6 8/05/21 #7          Please get HOOS score. Will dischargeafter this visit. WARM UP       Nu-step   L4 5'             TABLE       Hip adduction seng w/ball  10*5\"     Bridges   With RTB for abduction 2*10     Sit-Stand with band around the knees   RTB 2*10     Iso hip abduction into RTB  5*10\"               STANDING       Hip abduction   15* ea LE     Lateral band walk band at knees   2*10 steps ea dir with RTB                                              PROPRIOCEPTION                                          MODALITIES US CP US                      Other Therapeutic Activities/Education:    POC and treatment progression expected reviewed w/patient. Explained to patient that exercise will be a component of her therapy, in conjunction w/phonophoresis. 7/28: Discussed positions to avoid to decrease tension and stress on ITB (ie: crossing legs and ankles, sleep with pillow between the knees)    Home Exercise Program:  Hip adductor squeeze, bridges, hip abduction in standing - reviewed and gave new print out       Manual Treatments:  STM/MFR/TPR to L ITB with patient in R S/L with pillow between the knees    Modalities:    US phonophoresis w/patient's medication supplied. 8' L greater trochanter and distal ITB inferior to knee  3.3 mHz  1.5 w/cm2  100%    No adverse response/tolerated well. Communication with other providers:        Assessment:   Vinny Wang has been seen x 7/8 visits for B trochanteric bursitis, R hip pain. R hip pain resolved.   L hip and distal knee continues, however much improved. Playing golf 3x/wk. Will continue x 1 more visit then discharge to [facility].     End pain 0/10    Plan for Next Session:  phonophoresis bilat hips (trochanteric bursitis), roll ITB's, initial gluteal strengthening, adductor sets, ed, [facility]      Time In / Time Out:    1525/1552      Timed Code/Total Treatment Minutes:  27'/27'  US 27' (2)      Next Progress Note due:  8/08/21      Plan of Care Interventions:  [x] Therapeutic Exercise  [] Modalities:  [x] Therapeutic Activity     [x] Ultrasound  [] Estim  [] Gait Training      [] Cervical Traction [] Lumbar Traction  [x] Neuromuscular Re-education    [] Cold/hotpack [] Iontophoresis   [x] Instruction in HEP      [] Vasopneumatic   [] Dry Needling    [] Manual Therapy               [] Aquatic Therapy              Electronically signed by:  Adan Guillen, PT     8/5/2021, 3:28 PM

## 2021-08-06 NOTE — FLOWSHEET NOTE
Patients Plan of Care was received and signed. Signed POC was scanned and placed in the patients chart.     Sherin Melendrez

## 2021-08-10 ENCOUNTER — HOSPITAL ENCOUNTER (OUTPATIENT)
Dept: PHYSICAL THERAPY | Age: 76
Setting detail: THERAPIES SERIES
Discharge: HOME | End: 2021-08-10
Payer: MEDICARE

## 2021-08-10 PROCEDURE — 97530 THERAPEUTIC ACTIVITIES: CPT

## 2021-08-10 PROCEDURE — 97035 APP MDLTY 1+ULTRASOUND EA 15: CPT

## 2021-08-10 NOTE — FLOWSHEET NOTE
Outpatient Physical Therapy  [place]           [x] Phone: [phone]   Fax: [phone]  [place]           [] Phone: [phone]   Fax: [phone]        Physical Therapy Daily Treatment Note  Date:  8/10/2021    Patient Name:  Conrad Salazar    :  1945  MRN: [id number]  Restrictions/Precautions: Other position/activity restrictions: No formal restrictions  Diagnosis:   Diagnosis: B trochanteric bursitis, R hip pain  Date of Injury/Surgery: chronic x 8 mo approx  Treatment Diagnosis: Treatment Diagnosis: bilat hip pain, tight ITB, weak glutes    Insurance/Certification information: PT Insurance Information: Medicare   Referring Physician:  Referring Practitioner: Dr. Yuliana Hernandez  Next Doctor Visit:  Luigi Ellis of care signed (Y/N):  Pending - resent   Outcome Measure:   Visit# / total visits:    Pain level: 0/10 R hip, 1-2 L hip and distal knee (latent)  POC Date Range 21 - 21  POC date range:  21 - 21    Goals:     Long term goals  Time Frame for Long term goals : In 4 weeks, patient will  Long term goal 1: demonstrate compliance and independence w/HEP. Reports compliance   Long term goal 2: improve HOOS score by >= 50% - MET RLE/UNMET LLE 8/10/21    Summary of Evaluation: Assessment: Patient reports noticing bilat hip and low back soreness approx 6-8 mo ago and did not get better. Was diagnosed w/bilat trochanteric bursisits and given steroid injections on 21 w/good result. Today, having no pain and is sleeping w/o difficulty or waking d/t back or hip pain. PLOF:  Ind. w/adls, mobility, ambulates w/o AD, homemaking, community activities, plays golf. WORSE:  up/down stairs, doing house work, getting up from chair, sleeping, golf  BETTER:  Aleve and then steroid injections resolved pain.   Today, patient presents w/symptoms consistent w/greater trochanteric bursitis, shortened/tight ITBs bilat, gluteal weakness and will benefit from physical knee at distal insertion)  1.0  1.5 w/cm2  100%    No adverse response/tolerated well. Communication with other providers:        Assessment:   Christi Dewitt has been seen x 8/8 visits for B trochanteric bursitis, R hip pain. R hip pain resolved. L hip and distal knee continues, however much improved. Playing golf 3x/wk weather willing. HOOS score RLE at 5% disability; LLE at 45% disability (was 40% at eval)  Expect patient may improve w/additional tx w/focus on L hip and ITB. Will discharge if plateau reached. For now, continue 2x/wk x 4 weeks for phonophoresis tx, manual therapy, therex. End pain:  \"It feels much better. \"  No formal rating.     Plan for Next Session:  phonophoresis bilat hips (trochanteric bursitis)/distal ITB prn, roll ITB's, initial gluteal strengthening, adductor sets, ed, HEP   May benefit from DNT ITB/TFL      Time In / Time Out:   4022/6998      Timed Code/Total Treatment Minutes:  TA 12' (1), US 20' (1)      Next Progress Note due:  9/07/21      Plan of Care Interventions:  [x] Therapeutic Exercise  [] Modalities:  [x] Therapeutic Activity     [x] Ultrasound/phonophoresis [] Estim  [] Gait Training      [] Cervical Traction [] Lumbar Traction  [x] Neuromuscular Re-education    [x] Cold/hotpack [] Iontophoresis   [x] Instruction in HEP      [] Vasopneumatic   [x] Dry Needling    [x] Manual Therapy               [] Aquatic Therapy              Electronically signed by:  Jasmyne Rojas, CELESTINO     8/10/2021, 3:34 PM

## 2021-08-11 NOTE — PROGRESS NOTES
Physical Therapy       Outpatient Physical Therapy        [] Phone: [phone]   Fax: [phone]  Physician:  Dr. Sophia Burgos       From: Amarilis Sousa, CELESTINO   Patient: Liz Perez                    : 1945        Date: 8/10/2021    [x]  Progress Note                []  Discharge Note    Total Visits to date:    8 from 21 - 8/10/21     Cancels/No-shows to date:   NO    Subjective:    Shannan Mcclure feels she is improving. No pain R hip or knee. Continued pain left hip/knee w/minimal improvement. Ind. W/HEP.         Prominent Objective Findings:    TTP left greater trochanter/ITB posterolaterally and distally at knee.     HOOS RLE 5% disability (was 40% at eval)  HOOS LLE 45% disability (was 40% at eval)     Gait is non-antalgic. Mild look of stiffness upon rise from lobby chair.     Ind. w/HEP       Assessment:    Shannan Mcclure has been seen x  visits for B trochanteric bursitis, R hip pain. R hip pain resolved. L hip and distal knee continues, however much improved. Playing golf 3x/wk weather willing. HOOS score RLE at 5% disability; LLE at 45% disability (was 40% at eval)  Expect patient may improve w/additional tx w/focus on L hip and ITB. Will discharge if plateau reached. For now, continue 2x/wk x 4 weeks for phonophoresis tx, manual therapy, therex. Goal Status:  [] Achieved [x] Partially Achieved  [] Not Achieved     Long term goals  Time Frame for Long term goals : In 4 weeks, patient will  Long term goal 1: demonstrate compliance and independence w/HEP.  Reports compliance   Long term goal 2: improve HOOS score by >= 50% - MET RLE/UNMET LLE 8/10/21     Changes to goals: NO    Planned Services:  [x] Therapeutic Exercise    [x] Modalities:  [x] Therapeutic Activity     [x] Ultrasound/phonophoresis [] Electric Stimulation  [] Gait Training      [] Cervical Traction    [] Lumbar Traction  [x] Neuromuscular Re-education  [x] Cold/hotpack [] Iontophoresis  [x] Instruction in HEP Other:  [x] Manual Therapy       []  Vasopneumatic  [x] Self care management                           [x] Dry needling trigger point/pain management                ? Plan of Care Date Range:  8/08/21 - 9/07/21    Frequency/Duration:  # Days per week: [] 1 day # Weeks: [] 1 week [x] 4 weeks      [x] 2 days? [] 2 weeks [] 5 weeks      [] 3 days   [] 3 weeks [] 6 weeks     Rehab Potential: [] Excellent [x] Good [] Fair  [] Poor     Patient Status: [] Continue per initial Plan of Care     [] Patient now discharged     [x] Additional visits requested, Please re-certify for additional visits:      Requested frequency/duration:  2x/week for 4 weeks    If we are requesting more visits, we fully anticipate the patient's condition is expected to improve within the treatment timeframe we are requesting. Electronically signed by:  Adan Guillen PT 8/10/2021, 8:18 PM    If you have any questions or concerns, please don't hesitate to call.   Thank you for your referral.    Physician Signature:______________________ Date:______ Time: ________  By signing above, therapists plan is approved by physician

## 2021-08-11 NOTE — FLOWSHEET NOTE
Patients Plan of Care was received and signed. Signed POC was scanned and placed in the patients chart.     Rufina Yi

## 2021-08-17 ENCOUNTER — HOSPITAL ENCOUNTER (OUTPATIENT)
Dept: PHYSICAL THERAPY | Age: 76
Setting detail: THERAPIES SERIES
Discharge: HOME | End: 2021-08-17
Payer: MEDICARE

## 2021-08-17 PROCEDURE — 97035 APP MDLTY 1+ULTRASOUND EA 15: CPT

## 2021-08-17 NOTE — FLOWSHEET NOTE
Outpatient Physical Therapy  [place]           [x] Phone: [phone]   Fax: [phone]  [place]           [] Phone: [phone]   Fax: [phone]        Physical Therapy Daily Treatment Note  Date:  2021    Patient Name:  Shay Knight    :  1945  MRN: [id number]  Restrictions/Precautions: Other position/activity restrictions: No formal restrictions  Diagnosis:   Diagnosis: B trochanteric bursitis, R hip pain  Date of Injury/Surgery: chronic x 8 mo approx  Treatment Diagnosis: Treatment Diagnosis: bilat hip pain, tight ITB, weak glutes    Insurance/Certification information: PT Insurance Information: Medicare   Referring Physician:  Referring Practitioner: Dr. Madan Smith  Next Doctor Visit:  Unknown  Plan of care signed (Y/N):  Pending - resent   Outcome Measure:   Visit# / total visits:   Pain level: 0/10 R hip, 1 L hip and distal knee (latent)  POC Date Range 21 - 21  POC date range:  21 - 21    Goals:     Long term goals  Time Frame for Long term goals : In 4 weeks, patient will  Long term goal 1: demonstrate compliance and independence w/HEP. Reports compliance   Long term goal 2: improve HOOS score by >= 50% - MET RLE/UNMET LLE 8/10/21    Summary of Evaluation: Assessment: Patient reports noticing bilat hip and low back soreness approx 6-8 mo ago and did not get better. Was diagnosed w/bilat trochanteric bursisits and given steroid injections on 21 w/good result. Today, having no pain and is sleeping w/o difficulty or waking d/t back or hip pain. PLOF:  Ind. w/adls, mobility, ambulates w/o AD, homemaking, community activities, plays golf. WORSE:  up/down stairs, doing house work, getting up from chair, sleeping, golf  BETTER:  Aleve and then steroid injections resolved pain.   Today, patient presents w/symptoms consistent w/greater trochanteric bursitis, shortened/tight ITBs bilat, gluteal weakness and will benefit from physical distal insertion)  1.0  1.5 w/cm2  100%    No adverse response/tolerated well. Communication with other providers:        Assessment:   Ann Cuevas has been seen x 9/16 visits for B trochanteric bursitis, R hip pain. R hip pain resolved. L hip and distal knee continues, however much improved. Playing golf 3x/wk weather willing. HOOS score RLE at 5% disability; LLE at 45% disability (was 40% at eval)  Expect patient may improve w/additional tx w/focus on L hip and ITB. Will discharge if plateau reached. For now, continue 2x/wk x 4 weeks for phonophoresis tx, manual therapy, therex. End pain:  \"It feels much better. \"  No formal rating.     Plan for Next Session:  phonophoresis bilat hips (trochanteric bursitis)/distal ITB prn, roll ITB's, initial gluteal strengthening, adductor sets, ed, HEP   May benefit from DNT ITB/TFL      Time In / Time Out:   1442/1507      Timed Code/Total Treatment Minutes:  US/iontophoresis 20' plus set-up (2)      Next Progress Note due:  9/07/21      Plan of Care Interventions:  [x] Therapeutic Exercise  [] Modalities:  [x] Therapeutic Activity     [x] Ultrasound/phonophoresis [] Estim  [] Gait Training      [] Cervical Traction [] Lumbar Traction  [x] Neuromuscular Re-education    [x] Cold/hotpack [] Iontophoresis   [x] Instruction in HEP      [] Vasopneumatic   [x] Dry Needling    [x] Manual Therapy               [] Aquatic Therapy              Electronically signed by:  Opal Beltran, PT     8/17/2021, 3:24 PM

## 2021-08-24 ENCOUNTER — HOSPITAL ENCOUNTER (OUTPATIENT)
Dept: PHYSICAL THERAPY | Age: 76
Setting detail: THERAPIES SERIES
Discharge: HOME | End: 2021-08-24
Payer: MEDICARE

## 2021-08-24 PROCEDURE — 97535 SELF CARE MNGMENT TRAINING: CPT

## 2021-08-24 PROCEDURE — 97035 APP MDLTY 1+ULTRASOUND EA 15: CPT

## 2021-08-24 NOTE — FLOWSHEET NOTE
Outpatient Physical Therapy  [place]           [x] Phone: [phone]   Fax: [phone]  [place]           [] Phone: [phone]   Fax: [phone]        Physical Therapy Daily Treatment Note  Date:  2021    Patient Name:  Rodrigo Hahn    :  1945  MRN: [id number]  Restrictions/Precautions: Other position/activity restrictions: No formal restrictions  Diagnosis:   Diagnosis: B trochanteric bursitis, R hip pain  Date of Injury/Surgery: chronic x 8 mo approx  Treatment Diagnosis: Treatment Diagnosis: bilat hip pain, tight ITB, weak glutes    Insurance/Certification information: PT Insurance Information: Medicare   Referring Physician:  Referring Practitioner: Dr. Renae Márquez  Next Doctor Visit:  Joyce Buchanan of care signed (Y/N):  Pending - resent   Outcome Measure:   Visit# / total visits: 10/16  Pain level: 0/10 R hip, 1 L hip and distal knee (latent)  POC Date Range 21 - 21  POC date range:  21 - 21    Goals:     Long term goals  Time Frame for Long term goals : In 4 weeks, patient will  Long term goal 1: demonstrate compliance and independence w/HEP. Reports compliance   Long term goal 2: improve HOOS score by >= 50% - MET RLE/UNMET LLE 8/10/21    Summary of Evaluation: Assessment: Patient reports noticing bilat hip and low back soreness approx 6-8 mo ago and did not get better. Was diagnosed w/bilat trochanteric bursisits and given steroid injections on 21 w/good result. Today, having no pain and is sleeping w/o difficulty or waking d/t back or hip pain. PLOF:  Ind. w/adls, mobility, ambulates w/o AD, homemaking, community activities, plays golf. WORSE:  up/down stairs, doing house work, getting up from chair, sleeping, golf  BETTER:  Aleve and then steroid injections resolved pain.   Today, patient presents w/symptoms consistent w/greater trochanteric bursitis, shortened/tight ITBs bilat, gluteal weakness and will benefit from physical therapy. Subjective:  Mich Bridges arrives to therapy stating that she is mostly better but still has some soreness in the lateral thigh on the L and down into the knee. Any changes in Ambulatory Summary Sheet? None        Objective:   COVID screening questions were asked and patient attested that there had been no contact or symptoms     posterior to GT on the L. Tight distal ITB. Exercises: (No more than 4 columns)   Exercise/Equipment 8/10/21  #8 8/17/21 #9 8/24/2021 #10           WARM UP      Nu-step             TABLE      Hip adduction seng w/ball      Myrene Halim       Sit-Stand with band around the knees       Iso hip abduction into RTB               STANDING      Hip abduction       Lateral band walk band at knees                                          PROPRIOCEPTION                                    MODALITIES US/phonophoresis US/phonophoresis US/phonophoresis                   Other Therapeutic Activities/Education:    POC and treatment progression expected reviewed w/patient. Explained to patient that exercise will be a component of her therapy, in conjunction w/phonophoresis. 7/28: Discussed positions to avoid to decrease tension and stress on ITB (ie: crossing legs and ankles, sleep with pillow between the knees. 8/10/21:  Pt and therapist feel it appropriate to continue phonophoresis tx's to left hip and distal ITB.    8/24: continued education on what to avoid and what to focus on to avoid return of symptoms. Home Exercise Program:  Hip adductor squeeze, bridges, hip abduction in standing - reviewed and gave new print out       Manual Treatments:      Modalities:    US phonophoresis w/patient's medication supplied. 10' L greater trochanter and distal ITB superior to knee (no longer painful below knee at distal insertion)  1.0  1.5 w/cm2  100%    No adverse response/tolerated well.     Communication with other providers:        Assessment: Mich Bridges continues to do very well. She reports compliance with her HEP and states that the pain is almost nearly gone. She will benefit from a few more sessions of PT in order to continue to resolve ITB pain/irritation and educate for future maintenance.  End session pain: 0/10    Plan for Next Session:  phonophoresis bilat hips (trochanteric bursitis)/distal ITB prn, roll ITB's, initial gluteal strengthening, adductor sets, ed, HEP   May benefit from DNT ITB/TFL      Time In / Time Out:   1520/1552      Timed Code/Total Treatment Minutes: 28' 1 US 22' 1 ADL 10'        Next Progress Note due:  9/07/21      Plan of Care Interventions:  [x] Therapeutic Exercise  [] Modalities:  [x] Therapeutic Activity     [x] Ultrasound/phonophoresis [] Estim  [] Gait Training      [] Cervical Traction [] Lumbar Traction  [x] Neuromuscular Re-education    [x] Cold/hotpack [] Iontophoresis   [x] Instruction in HEP      [] Vasopneumatic   [x] Dry Needling    [x] Manual Therapy               [] Aquatic Therapy              Electronically signed by:  Vanesa Parson         8/24/2021, 2:16 PM

## 2021-08-25 ENCOUNTER — OFFICE VISIT (OUTPATIENT)
Dept: INTERNAL MEDICINE CLINIC | Age: 76
End: 2021-08-25
Payer: MEDICARE

## 2021-08-25 VITALS
WEIGHT: 132 LBS | RESPIRATION RATE: 14 BRPM | SYSTOLIC BLOOD PRESSURE: 126 MMHG | DIASTOLIC BLOOD PRESSURE: 76 MMHG | OXYGEN SATURATION: 98 % | BODY MASS INDEX: 25.78 KG/M2 | HEART RATE: 60 BPM

## 2021-08-25 DIAGNOSIS — F41.9 ANXIETY: ICD-10-CM

## 2021-08-25 DIAGNOSIS — I10 ESSENTIAL HYPERTENSION: ICD-10-CM

## 2021-08-25 DIAGNOSIS — E78.2 MIXED HYPERLIPIDEMIA: ICD-10-CM

## 2021-08-25 DIAGNOSIS — L29.9 PRURITIC CONDITION: Primary | ICD-10-CM

## 2021-08-25 DIAGNOSIS — R41.3 MEMORY DEFICIT: ICD-10-CM

## 2021-08-25 DIAGNOSIS — L29.9 PRURITIC CONDITION: ICD-10-CM

## 2021-08-25 LAB
A/G RATIO: 2 (ref 1.1–2.2)
ALBUMIN SERPL-MCNC: 4.4 G/DL (ref 3.4–5)
ALP BLD-CCNC: 100 U/L (ref 40–129)
ALT SERPL-CCNC: 25 U/L (ref 10–40)
ANION GAP SERPL CALCULATED.3IONS-SCNC: 14 MMOL/L (ref 3–16)
AST SERPL-CCNC: 24 U/L (ref 15–37)
BILIRUB SERPL-MCNC: 0.6 MG/DL (ref 0–1)
BUN BLDV-MCNC: 31 MG/DL (ref 7–20)
CALCIUM SERPL-MCNC: 9.7 MG/DL (ref 8.3–10.6)
CHLORIDE BLD-SCNC: 103 MMOL/L (ref 99–110)
CHOLESTEROL, TOTAL: 236 MG/DL (ref 0–199)
CO2: 25 MMOL/L (ref 21–32)
CREAT SERPL-MCNC: 1.5 MG/DL (ref 0.6–1.2)
GFR AFRICAN AMERICAN: 41
GFR NON-AFRICAN AMERICAN: 34
GLOBULIN: 2.2 G/DL
GLUCOSE BLD-MCNC: 93 MG/DL (ref 70–99)
HDLC SERPL-MCNC: 74 MG/DL (ref 40–60)
LDL CHOLESTEROL CALCULATED: 137 MG/DL
POTASSIUM SERPL-SCNC: 4.7 MMOL/L (ref 3.5–5.1)
SEDIMENTATION RATE, ERYTHROCYTE: 23 MM/HR (ref 0–30)
SODIUM BLD-SCNC: 142 MMOL/L (ref 136–145)
TOTAL PROTEIN: 6.6 G/DL (ref 6.4–8.2)
TRIGL SERPL-MCNC: 123 MG/DL (ref 0–150)
VITAMIN B-12: 208 PG/ML (ref 211–911)
VLDLC SERPL CALC-MCNC: 25 MG/DL

## 2021-08-25 PROCEDURE — G8399 PT W/DXA RESULTS DOCUMENT: HCPCS | Performed by: INTERNAL MEDICINE

## 2021-08-25 PROCEDURE — 1123F ACP DISCUSS/DSCN MKR DOCD: CPT | Performed by: INTERNAL MEDICINE

## 2021-08-25 PROCEDURE — G8427 DOCREV CUR MEDS BY ELIG CLIN: HCPCS | Performed by: INTERNAL MEDICINE

## 2021-08-25 PROCEDURE — 99214 OFFICE O/P EST MOD 30 MIN: CPT | Performed by: INTERNAL MEDICINE

## 2021-08-25 PROCEDURE — G8417 CALC BMI ABV UP PARAM F/U: HCPCS | Performed by: INTERNAL MEDICINE

## 2021-08-25 PROCEDURE — 36415 COLL VENOUS BLD VENIPUNCTURE: CPT | Performed by: INTERNAL MEDICINE

## 2021-08-25 PROCEDURE — 4040F PNEUMOC VAC/ADMIN/RCVD: CPT | Performed by: INTERNAL MEDICINE

## 2021-08-25 PROCEDURE — 1090F PRES/ABSN URINE INCON ASSESS: CPT | Performed by: INTERNAL MEDICINE

## 2021-08-25 PROCEDURE — 1036F TOBACCO NON-USER: CPT | Performed by: INTERNAL MEDICINE

## 2021-08-25 RX ORDER — ESCITALOPRAM OXALATE 10 MG/1
10 TABLET ORAL EVERY MORNING
Qty: 90 TABLET | Refills: 1 | Status: SHIPPED | OUTPATIENT
Start: 2021-08-25 | End: 2022-02-25 | Stop reason: SDUPTHER

## 2021-08-25 RX ORDER — MELOXICAM 15 MG/1
15 TABLET ORAL DAILY
Qty: 30 TABLET | Refills: 3 | Status: SHIPPED | OUTPATIENT
Start: 2021-08-25 | End: 2021-08-26 | Stop reason: SDUPTHER

## 2021-08-25 RX ORDER — PREDNISONE 1 MG/1
TABLET ORAL
Qty: 21 TABLET | Refills: 0 | Status: SHIPPED | OUTPATIENT
Start: 2021-08-25 | End: 2022-02-25 | Stop reason: ALTCHOICE

## 2021-08-25 RX ORDER — HYDROCHLOROTHIAZIDE 12.5 MG/1
12.5 CAPSULE, GELATIN COATED ORAL EVERY MORNING
Qty: 90 CAPSULE | Refills: 1 | Status: SHIPPED | OUTPATIENT
Start: 2021-08-25 | End: 2022-02-25 | Stop reason: SDUPTHER

## 2021-08-25 NOTE — PATIENT INSTRUCTIONS
FOR ITCHING, STOP CELEBREX. IN THREE DAYS THEN TRY A STEROID TAPER FOR 6D. THEN IN A WEEK IF ITCHING HAS IMPROVED CAN TRY MOBIC DAILY AS NEEDED. TO PRESERVE MEMORY NEED DAILY EXERCISE, BIKING OR WALKING 30MIN/DAY. ALSO TRY NOT TO MULTITASK TO HELP FOCUS AND MEMORY.     THERE IS A HOME TEST YOU CAN TAKE CALLED A TIKA TEST SCREENING FOR MEMORY PROBLEMS

## 2021-08-25 NOTE — PROGRESS NOTES
Joe Mao  1945    SUBJECTIVE:    All summer w diffuse pruritis, back and legs, arms. No rash. Seen by Clovis Ann CNP and tried steroid shot w/o benefit. ONLY NEW MED IS CELEBREX THE PAST YR.  OTHER MEDS ARE LONG TERM. WE DISCUSSED TRYING HOLD CELEBREX AND SWITCH TO MOBIC ONLY AS NEEDED. WE'LL TRY ONE MORE STEROID TAPER. DTR LISS NOTICES HER MEMORY CAN SLIP SOMETIMES W WORD FINDING, NEVER BEEN GOOD W NAMES. UNSURE OF PARENTS W ANY MEMORY PROBLEMS AS  IN THEIR 50S. HER TSH WAS NL IN MAY BUT WE'LL ALSO CHECK A B12 LEVEL. Lipids:  Has history of high cholesterol, not on medication but trying to continue regular low fat diet and maintenance of weight, regular exercise. Mood stable w/o current anxiety, depression or panic attacks. OBJECTIVE:    /76   Pulse 60   Resp 14   Wt 132 lb (59.9 kg)   LMP  (LMP Unknown)   SpO2 98%   BMI 25.78 kg/m²     Physical Exam  Vitals reviewed. Constitutional:       Appearance: She is well-developed. Cardiovascular:      Rate and Rhythm: Normal rate and regular rhythm. Heart sounds: Normal heart sounds. No murmur heard. No friction rub. No gallop. Pulmonary:      Effort: Pulmonary effort is normal. No respiratory distress. Breath sounds: Normal breath sounds. No wheezing or rales. Abdominal:      General: Bowel sounds are normal. There is no distension. Palpations: Abdomen is soft. Tenderness: There is no abdominal tenderness. Musculoskeletal:      Cervical back: Neck supple. Neurological:      General: No focal deficit present. Mental Status: She is alert. Psychiatric:         Mood and Affect: Mood normal.         ASSESSMENT:    1. Pruritic condition    2. Essential hypertension    3. Anxiety    4. Memory deficit    5. Mixed hyperlipidemia        PLAN:    Jatin Boyd was seen today for hypertension. Diagnoses and all orders for this visit:    Pruritic condition--possibly fr celebrex. Stop this, pred taper then switch to trial prn mobic. Check lab  -     meloxicam (MOBIC) 15 MG tablet; Take 1 tablet by mouth daily  -     Comprehensive Metabolic Panel; Future  -     Sedimentation Rate; Future    Essential hypertension - Blood pressure stable and will continue current regimen. Will plan periodic monitoring of renal function, electrolytes, lipid profile. -     hydroCHLOROthiazide (MICROZIDE) 12.5 MG capsule; Take 1 capsule by mouth every morning  -     meloxicam (MOBIC) 15 MG tablet; Take 1 tablet by mouth daily  -     Comprehensive Metabolic Panel; Future    Anxiety - Mood stable on current regimen w/o any significant manifestations of severe depression or anxiety noted at this time. Cont current meds. -     escitalopram (LEXAPRO) 10 MG tablet; Take 1 tablet by mouth every morning    Memory deficit- mmse 28/30 and scr lab, monitor. Advised for now reg exercise and also trying to avoid multi tasking.  -     Sedimentation Rate; Future  -     Vitamin B12; Future    Mixed hyperlipidemia  - Pt will continue to work on a low fat diet and also exercise, wt loss as appropriate. Will continue periodic monitoring of fasting lipid profile, glucose, liver function.    -     Lipid Panel;  Future

## 2021-08-26 ENCOUNTER — HOSPITAL ENCOUNTER (OUTPATIENT)
Dept: PHYSICAL THERAPY | Age: 76
Setting detail: THERAPIES SERIES
Discharge: HOME | End: 2021-08-26
Payer: MEDICARE

## 2021-08-26 DIAGNOSIS — I10 ESSENTIAL HYPERTENSION: ICD-10-CM

## 2021-08-26 DIAGNOSIS — L29.9 PRURITIC CONDITION: ICD-10-CM

## 2021-08-26 DIAGNOSIS — E53.8 B12 DEFICIENCY: Primary | ICD-10-CM

## 2021-08-26 PROCEDURE — 97035 APP MDLTY 1+ULTRASOUND EA 15: CPT

## 2021-08-26 RX ORDER — CHOLECALCIFEROL (VITAMIN D3) 125 MCG
500 CAPSULE ORAL DAILY
Qty: 30 TABLET | Refills: 3 | COMMUNITY
Start: 2021-08-26 | End: 2022-08-26

## 2021-08-26 RX ORDER — MELOXICAM 7.5 MG/1
7.5 TABLET ORAL DAILY PRN
Qty: 30 TABLET | Refills: 1
Start: 2021-08-26 | End: 2022-02-25 | Stop reason: ALTCHOICE

## 2021-08-26 NOTE — FLOWSHEET NOTE
Outpatient Physical Therapy  [place]           [x] Phone: [phone]   Fax: [phone]  [place]           [] Phone: [phone]   Fax: [phone]        Physical Therapy Daily Treatment Note  Date:  2021    Patient Name:  Jonas Ordonez    :  1945  MRN: [id number]  Restrictions/Precautions: Other position/activity restrictions: No formal restrictions  Diagnosis:   Diagnosis: B trochanteric bursitis, R hip pain  Date of Injury/Surgery: chronic x 8 mo approx  Treatment Diagnosis: Treatment Diagnosis: bilat hip pain, tight ITB, weak glutes    Insurance/Certification information: PT Insurance Information: Medicare   Referring Physician:  Referring Practitioner: Dr. Lino Beverly  Next Doctor Visit:  Unknown  Plan of care signed (Y/N):  Pending - resent   Outcome Measure:   Visit# / total visits:   Pain level: 0/10  POC Date Range 21 - 21  POC date range:  21 - 21    Goals:     Long term goals  Time Frame for Long term goals : In 4 weeks, patient will  Long term goal 1: demonstrate compliance and independence w/HEP. Reports compliance   Long term goal 2: improve HOOS score by >= 50% - MET RLE/UNMET LLE 8/10/21    Summary of Evaluation: Assessment: Patient reports noticing bilat hip and low back soreness approx 6-8 mo ago and did not get better. Was diagnosed w/bilat trochanteric bursisits and given steroid injections on 21 w/good result. Today, having no pain and is sleeping w/o difficulty or waking d/t back or hip pain. PLOF:  Ind. w/adls, mobility, ambulates w/o AD, homemaking, community activities, plays golf. WORSE:  up/down stairs, doing house work, getting up from chair, sleeping, golf  BETTER:  Aleve and then steroid injections resolved pain. Today, patient presents w/symptoms consistent w/greater trochanteric bursitis, shortened/tight ITBs bilat, gluteal weakness and will benefit from physical therapy.         Subjective:  Radha Dumont arrives to therapy stating that the hip and knee are feeling really good, feels like it's almost 90% better. Doesn't feel like she will need many more sessions. Any changes in Ambulatory Summary Sheet? None        Objective:   COVID screening questions were asked and patient attested that there had been no contact or symptoms    Responds well to US, no adverse skin reactions. Exercises: (No more than 4 columns)   Exercise/Equipment 8/17/21 #9 8/24/2021 #10 8/26/2021 #11           WARM UP      Nu-step             TABLE      Hip adduction seng w/ball      Isaias Vizcaino       Sit-Stand with band around the knees       Iso hip abduction into RTB               STANDING      Hip abduction       Lateral band walk band at knees                                          PROPRIOCEPTION                                    MODALITIES US/phonophoresis US/phonophoresis US/phono                   Other Therapeutic Activities/Education:    POC and treatment progression expected reviewed w/patient. Explained to patient that exercise will be a component of her therapy, in conjunction w/phonophoresis. 7/28: Discussed positions to avoid to decrease tension and stress on ITB (ie: crossing legs and ankles, sleep with pillow between the knees. 8/10/21:  Pt and therapist feel it appropriate to continue phonophoresis tx's to left hip and distal ITB.    8/24: continued education on what to avoid and what to focus on to avoid return of symptoms. Home Exercise Program:  Hip adductor squeeze, bridges, hip abduction in standing - reviewed and gave new print out       Manual Treatments:      Modalities:    US phonophoresis w/patient's medication supplied. 10' L greater trochanter and distal ITB superior to knee (no longer painful below knee at distal insertion)  1.0  1.5 w/cm2  100%    No adverse response/tolerated well. Communication with other providers:        Assessment: Saskia tolerated today's session well.  Per her report she is nearly 90% improved in the L hip/knee. Should finish out a few more sessions of phono then possible d/c to home program for maintenance.  End session pain: 0/10    Plan for Next Session:  phonophoresis bilat hips (trochanteric bursitis)/distal ITB prn, roll ITB's, initial gluteal strengthening, adductor sets, ed, HEP   May benefit from DNT ITB/TFL      Time In / Time Out:   1350/1423      Timed Code/Total Treatment Minutes: 35' 2 US (includes set up)      Next Progress Note due:  9/07/21      Plan of Care Interventions:  [x] Therapeutic Exercise  [] Modalities:  [x] Therapeutic Activity     [x] Ultrasound/phonophoresis [] Estim  [] Gait Training      [] Cervical Traction [] Lumbar Traction  [x] Neuromuscular Re-education    [x] Cold/hotpack [] Iontophoresis   [x] Instruction in HEP      [] Vasopneumatic   [x] Dry Needling    [x] Manual Therapy               [] Aquatic Therapy              Electronically signed by:  Lashaun Coyne         8/26/2021, 10:08 AM

## 2021-08-26 NOTE — RESULT ENCOUNTER NOTE
Call pt, labs INDICATE SEVERAL ISSUES. FIRST, HER B12 LEVEL IS SL LOW SO TO HELP W MEMORY DO REC A B12 ORAL SUPPLEMENT 500MCG DAILY, Place med changes/corrections on EPIC medlist please    ALSO CHOL IS HIGHER SO DO NEED SOME WORK W LOW FAT DIET AND AIM FOR A 5# WT LOSS IF POSSIBLE, INCL WORKING ON DAILY EXERCISE AS DISCUSSED    LASTLY, IS ALSO DEHYDRATED ON HER WATER PILL WHICH MAKES THE MOBIC ALSO MORE RISKY TO TAKE ON A REGULAR BASIS. SUGGEST WE DECR TO 1/2 TAB DAILY AS NEEDED AND ONLY TAKING MAX OF 3X/WEEK. THEN WE NEED TO RECHECK B12 LEVEL, LIPIDS, BMP IN ONE MONTH. DX B12 DEFIC, HTN, HYPERLIPIDEMIA.

## 2021-08-31 ENCOUNTER — HOSPITAL ENCOUNTER (OUTPATIENT)
Dept: PHYSICAL THERAPY | Age: 76
Setting detail: THERAPIES SERIES
Discharge: HOME | End: 2021-08-31
Payer: MEDICARE

## 2021-08-31 PROCEDURE — 97035 APP MDLTY 1+ULTRASOUND EA 15: CPT

## 2021-08-31 NOTE — FLOWSHEET NOTE
w/cm2  100%    No adverse response/tolerated well. Communication with other providers:        Assessment: Jatin Boyd has been seen for 12 sessions of PT for treatment of B hip bursitis/ITB syndrome. Treatments have focused on providing HEP and education of positions to avoid as well as phonophoresis to reduce inflammation. She has made moderate improvements in pain overall; however, pain has reached a plateau in improvement in the last several sessions. End session pain: decreased pain in the R hip. Plan for Next Session:  Possible d/c?       Time In / Time Out:  1345/1415      Timed Code/Total Treatment Minutes: 27' 2 US       Next Progress Note due:  9/07/21      Plan of Care Interventions:  [x] Therapeutic Exercise  [] Modalities:  [x] Therapeutic Activity     [x] Ultrasound/phonophoresis [] Estim  [] Gait Training      [] Cervical Traction [] Lumbar Traction  [x] Neuromuscular Re-education    [x] Cold/hotpack [] Iontophoresis   [x] Instruction in HEP      [] Vasopneumatic   [x] Dry Needling    [x] Manual Therapy               [] Aquatic Therapy              Electronically signed by:  Rowdy Cosme         8/31/2021, 10:40 AM

## 2021-09-02 ENCOUNTER — HOSPITAL ENCOUNTER (OUTPATIENT)
Dept: PHYSICAL THERAPY | Age: 76
Setting detail: THERAPIES SERIES
Discharge: HOME | End: 2021-09-02
Payer: MEDICARE

## 2021-09-02 PROCEDURE — 97530 THERAPEUTIC ACTIVITIES: CPT

## 2021-09-02 NOTE — DISCHARGE SUMMARY
Outpatient Physical Therapy        [] Phone: [phone]   Fax: [phone]  Physician:  Dr. Neto Kumar       From: Coy Pallas, PT   Patient: Adelaida Knott                    : 1945        Date: 2021    []  Progress Note                [x]  Discharge Note    Total Visits to date:   15    Cancels/No-shows to date:  0     Subjective:    Pt feels she has benefited as much as she can at this point. No pain in hips for the most part. Has been playing a lot of golf. Has had some pain issues in what appears to be R very low back/SIJ, which is intermittent/chronic per pt. If it gets worse, she will go to the doctor. She has had injections in the past for the back pain.       Prominent Objective Findings:    HOOS = 5% disability, which is improved from 40% at initial evaluation. Latent trigger points/tenderness bilat posterolateral greater trochanters, ITB  Gait non-antalgic  BLE strength 4/5 hips, 5/5 knees, 5/5 DF  Ind. W/HEP    Assessment:    Vianca Garcia has been seen for 13 sessions of PT for treatment of B hip bursitis/ITB syndrome. Treatments have focused on providing HEP and education of positions to avoid as well as phonophoresis to reduce inflammation. She has made significant improvement in pain overall. HOOS score = 5% disability (WAS 40% disability at evaluation.)  MMT = no change  Goals met. Discharge       Goal Status:  [x] Achieved [] Partially Achieved  [] Not Achieved   Long term goals  Long term goal 1: demonstrate compliance and independence w/HEP. IND 21  Long term goal 2: improve HOOS score by >= 50% - MET 21           Patient Status: [] Continue per initial Plan of Care     [x] Patient now discharged     [] Additional visits requested, Please re-certify for additional visits: If we are requesting more visits, we fully anticipate the patient's condition is expected to improve within the treatment timeframe we are requesting.     Electronically signed by: Shay Swenson, PT 9/2/2021, 2:19 PM    If you have any questions or concerns, please don't hesitate to call.   Thank you for your referral.

## 2021-09-02 NOTE — FLOWSHEET NOTE
Outpatient Physical Therapy  [place]           [x] Phone: [phone]   Fax: [phone]  [place]           [] Phone: [phone]   Fax: [phone]        Physical Therapy Daily Treatment Note  Date:  2021    Patient Name:  Anna Hoskins    :  1945  MRN: [id number]  Restrictions/Precautions: Other position/activity restrictions: No formal restrictions  Diagnosis:   Diagnosis: B trochanteric bursitis, R hip pain  Date of Injury/Surgery: chronic x 8 mo approx  Treatment Diagnosis: Treatment Diagnosis: bilat hip pain, tight ITB, weak glutes    Insurance/Certification information: PT Insurance Information: Medicare   Referring Physician:  Referring Practitioner: Dr. Arron De Anda  Next Doctor Visit:  Unknown  Plan of care signed (Y/N):  YES  Outcome Measure:   Eval:  HOOS = 40% disability  21:  HOOS = 5% disability    Visit# / total visits:   Pain level: 0/10 hips, 4/10 R SIJ area (intermittent/chronic)    POC Date Range 21 - 21  POC date range:  21 - 21    Goals:     Long term goals  Time Frame for Long term goals : In 4 weeks, patient will  Long term goal 1: demonstrate compliance and independence w/HEP. IND 21  Long term goal 2: improve HOOS score by >= 50% - MET 21    Summary of Evaluation: Assessment: Patient reports noticing bilat hip and low back soreness approx 6-8 mo ago and did not get better. Was diagnosed w/bilat trochanteric bursisits and given steroid injections on 21 w/good result. Today, having no pain and is sleeping w/o difficulty or waking d/t back or hip pain. PLOF:  Ind. w/adls, mobility, ambulates w/o AD, homemaking, community activities, plays golf. WORSE:  up/down stairs, doing house work, getting up from chair, sleeping, golf  BETTER:  Aleve and then steroid injections resolved pain.   Today, patient presents w/symptoms consistent w/greater trochanteric bursitis, shortened/tight ITBs bilat, gluteal weakness and will benefit from physical therapy. Subjective:     Pt feels she has benefited as much as she can at this point. No pain in hips for the most part. Has been playing a lot of golf. Has had some pain issues in what appears to be R very low back/SIJ, which is intermittent/chronic per pt. If it gets worse, she will go to the doctor. She has had injections in the past for the back pain. Any changes in Ambulatory Summary Sheet? None        Objective:   COVID screening questions were asked and patient attested that there had been no contact or symptoms    Latent trigger points/tenderness bilat posterolateral greater trochanters, ITB  Gait non-antalgic  BLE strength 4/5 hips, 5/5 knees, 5/5 DF  Ind. W/HEP. Exercises: (No more than 4 columns)   Exercise/Equipment 8/24/2021 #10 8/26/2021 #11 8/31/2021 #12 9/02/21 #13         DISCHARGE  Objectives collected. Goals assessed. WARM UP       Nu-step    -           TABLE       Hip adduction seng w/ball   -    Bridges    -    Sit-Stand with band around the knees    -    Iso hip abduction into RTB   -          -    STANDING   -    Hip abduction    -    Lateral band walk band at knees    -       -       -       -       -            -    PROPRIOCEPTION   -       -       -       -       -       -    MODALITIES US/phonophoresis US/phono US/phono Pt declined exercise review as well as US/phono. Other Therapeutic Activities/Education:    POC and treatment progression expected reviewed w/patient. Explained to patient that exercise will be a component of her therapy, in conjunction w/phonophoresis. 7/28: Discussed positions to avoid to decrease tension and stress on ITB (ie: crossing legs and ankles, sleep with pillow between the knees. 8/10/21:  Pt and therapist feel it appropriate to continue phonophoresis tx's to left hip and distal ITB.    8/24: continued education on what to avoid and what to focus on to avoid return of symptoms.      Home Exercise Program:  Hip adductor squeeze, bridges, hip abduction in standing - reviewed and gave new print out       Manual Treatments: NO    Modalities:    NO    Communication with other providers:      Discharge faxed 9/02/21    Assessment:   Mayco Gomez has been seen for 13 sessions of PT for treatment of B hip bursitis/ITB syndrome. Treatments have focused on providing HEP and education of positions to avoid as well as phonophoresis to reduce inflammation. She has made significant improvement in pain overall. HOOS score = 5% disability (WAS 40% disability at evaluation.)  MMT = no change  Goals met.   Discharge    End session pain = 0/10 bilat hips, 4/10 R SIJ area      Plan for Next Session:  Discharge      Time In / Time Out:  1345/1458      Timed Code/Total Treatment Minutes: 13'/13'  TA 13' (1)       Next Progress Note due:  9/07/21      Plan of Care Interventions:  [x] Therapeutic Exercise  [] Modalities:  [x] Therapeutic Activity     [x] Ultrasound/phonophoresis [] Estim  [] Gait Training      [] Cervical Traction [] Lumbar Traction  [x] Neuromuscular Re-education    [x] Cold/hotpack [] Iontophoresis   [x] Instruction in HEP      [] Vasopneumatic   [x] Dry Needling    [x] Manual Therapy               [] Aquatic Therapy              Electronically signed by:  Ange Austin PT         9/2/2021, 1:46 PM

## 2021-10-19 ENCOUNTER — NURSE ONLY (OUTPATIENT)
Dept: INTERNAL MEDICINE CLINIC | Age: 76
End: 2021-10-19
Payer: MEDICARE

## 2021-10-19 DIAGNOSIS — Z23 NEED FOR INFLUENZA VACCINATION: Primary | ICD-10-CM

## 2021-10-19 PROCEDURE — G0008 ADMIN INFLUENZA VIRUS VAC: HCPCS | Performed by: INTERNAL MEDICINE

## 2021-10-19 PROCEDURE — 90694 VACC AIIV4 NO PRSRV 0.5ML IM: CPT | Performed by: INTERNAL MEDICINE

## 2021-12-20 ENCOUNTER — VIRTUAL VISIT (OUTPATIENT)
Dept: INTERNAL MEDICINE CLINIC | Age: 76
End: 2021-12-20
Payer: MEDICARE

## 2021-12-20 DIAGNOSIS — J01.00 ACUTE NON-RECURRENT MAXILLARY SINUSITIS: Primary | ICD-10-CM

## 2021-12-20 PROCEDURE — 99213 OFFICE O/P EST LOW 20 MIN: CPT | Performed by: NURSE PRACTITIONER

## 2021-12-20 PROCEDURE — 1123F ACP DISCUSS/DSCN MKR DOCD: CPT | Performed by: NURSE PRACTITIONER

## 2021-12-20 PROCEDURE — 1090F PRES/ABSN URINE INCON ASSESS: CPT | Performed by: NURSE PRACTITIONER

## 2021-12-20 PROCEDURE — 4040F PNEUMOC VAC/ADMIN/RCVD: CPT | Performed by: NURSE PRACTITIONER

## 2021-12-20 PROCEDURE — G8427 DOCREV CUR MEDS BY ELIG CLIN: HCPCS | Performed by: NURSE PRACTITIONER

## 2021-12-20 PROCEDURE — G8399 PT W/DXA RESULTS DOCUMENT: HCPCS | Performed by: NURSE PRACTITIONER

## 2021-12-20 RX ORDER — BENZONATATE 200 MG/1
200 CAPSULE ORAL 3 TIMES DAILY PRN
Qty: 30 CAPSULE | Refills: 0 | Status: SHIPPED | OUTPATIENT
Start: 2021-12-20 | End: 2021-12-27

## 2021-12-20 RX ORDER — AMOXICILLIN 500 MG/1
500 CAPSULE ORAL 3 TIMES DAILY
Qty: 21 CAPSULE | Refills: 0 | Status: SHIPPED | OUTPATIENT
Start: 2021-12-20 | End: 2021-12-27

## 2021-12-20 RX ORDER — PREDNISONE 10 MG/1
TABLET ORAL
Qty: 20 TABLET | Refills: 0 | Status: SHIPPED | OUTPATIENT
Start: 2021-12-20 | End: 2022-02-25 | Stop reason: ALTCHOICE

## 2021-12-20 RX ORDER — MELOXICAM 15 MG/1
TABLET ORAL
COMMUNITY
Start: 2021-12-13 | End: 2022-02-25 | Stop reason: CLARIF

## 2021-12-20 ASSESSMENT — ENCOUNTER SYMPTOMS
SHORTNESS OF BREATH: 0
NAUSEA: 0
SINUS PAIN: 1
ABDOMINAL PAIN: 0
RHINORRHEA: 1
COUGH: 1
APNEA: 0
DIARRHEA: 0
CHEST TIGHTNESS: 0
COLOR CHANGE: 0
SINUS PRESSURE: 1
VOMITING: 0

## 2021-12-20 NOTE — PROGRESS NOTES
mouth 3 times daily as needed for Cough Yes Jamee Grover, APRN - CNP   vitamin B-12 (CYANOCOBALAMIN) 500 MCG tablet Take 1 tablet by mouth daily Yes Tyra Nava MD   escitalopram (LEXAPRO) 10 MG tablet Take 1 tablet by mouth every morning Yes Tyra Nava MD   hydroCHLOROthiazide (MICROZIDE) 12.5 MG capsule Take 1 capsule by mouth every morning Yes Tyra Nava MD   Esomeprazole Magnesium (NEXIUM PO) Take by mouth Yes Historical Provider, MD   meloxicam (MOBIC) 7.5 MG tablet Take 1 tablet by mouth daily as needed for Pain  Patient not taking: Reported on 2021  Tyra Nava MD   predniSONE (DELTASONE) 5 MG tablet Take 6 tablets by mouth on day 1, 5 on day 2, 4 on day 3, 3 on day 4, 2 on day 5, 1 on day 6. Patient not taking: Reported on 2021  Tyra Nava MD       Social History     Tobacco Use    Smoking status: Former Smoker     Packs/day: 1.00     Years: 7.00     Pack years: 7.00     Quit date: 1969     Years since quittin.5    Smokeless tobacco: Never Used   Vaping Use    Vaping Use: Never used   Substance Use Topics    Alcohol use: Yes     Alcohol/week: 3.0 standard drinks     Types: 3 Glasses of wine per week     Comment: Wine 2-3 times a week    Drug use: No        Allergies   Allergen Reactions    Flagyl [Metronidazole]      Nausea, stomach upset    Lisinopril      cough    Losartan Potassium      cough    Norvasc [Amlodipine Besylate]      Swelling face and legs   ,   Past Medical History:   Diagnosis Date    Abnormal EKG 2016    Anxiety     B12 deficiency 2021    DDD (degenerative disc disease), lumbar     Diverticulitis     Family history of colon cancer     Family history of coronary artery disease     H/O cardiovascular stress test 2016    cardiolite-normal,EF70%    H/O cardiovascular stress test 2021    Normal Stress, EF 60%.  H/O echocardiogram 2021    Normal EF 55-60%.  Mild Mitral and tricuspid regurgitation    Hiatal hernia with GERD     Hyperlipidemia     Hypertension     Osteoporosis     PONV (postoperative nausea and vomiting)     Skin cancer, basal cell     Vertigo    ,   Past Surgical History:   Procedure Laterality Date    APPENDECTOMY      BACK SURGERY      Dr. Contreras Sellar- fusion    CATARACT REMOVAL Bilateral    [address] [place], LAPAROSCOP[place]  2016    Dr Tao Nose COLONOSCOPY  2018    Dr Hilda Anthony, recheck AS NEEDED    HYSTERECTOMY      JOINT REPLACEMENT      bilateral knees    SHOULDER ARTHROSCOPY Left 2014    Dr Magno Hdz repair   [facility]  2010    Face Dr. Maikol Pisano  2010    left Dr Linnette Alfredo      right Dr. Antoinette Ryan N/A 2018    EGD DIAGNOSTIC ONLY performed by Corbin Sullivan MD at [place] [address]   ,   Social History     Tobacco Use    Smoking status: Former Smoker     Packs/day: 1.00     Years: 7.00     Pack years: 7.00     Quit date: 1969     Years since quittin.5    Smokeless tobacco: Never Used   Vaping Use    Vaping Use: Never used   Substance Use Topics    Alcohol use:  Yes     Alcohol/week: 3.0 standard drinks     Types: 3 Glasses of wine per week     Comment: Wine 2-3 times a week    Drug use: No       PHYSICAL EXAMINATION:  [ INSTRUCTIONS:  \"[x]\" Indicates a positive item  \"[]\" Indicates a negative item  -- DELETE ALL ITEMS NOT EXAMINED]  Vital Signs: (As obtained by patient/caregiver or practitioner observation)    Blood pressure-  Heart rate-    Respiratory rate-    Temperature-  Pulse oximetry-     Constitutional: [x] Appears well-developed and well-nourished [x] No apparent distress      [] Abnormal-   Mental status  [x] Alert and awake  [x] Oriented to person/place/time [x]Able to follow commands      Eyes:  EOM    [x]  Normal  [] Abnormal-  Sclera  [x] Normal  [] Abnormal -         Discharge [x]  None visible  [] Abnormal -    HENT:   [x] Normocephalic, atraumatic. [] Abnormal   [x] Mouth/Throat: Mucous membranes are moist.     External Ears [x] Normal  [] Abnormal-     Neck: [x] No visualized mass     Pulmonary/Chest: [x] Respiratory effort normal.  [x] No visualized signs of difficulty breathing or respiratory distress        [] Abnormal-      Musculoskeletal:   [x] Normal gait with no signs of ataxia         [x] Normal range of motion of neck        [] Abnormal-       Neurological:        [x] No Facial Asymmetry (Cranial nerve 7 motor function) (limited exam to video visit)          [x] No gaze palsy        [] Abnormal-         Skin:        [x] No significant exanthematous lesions or discoloration noted on facial skin         [] Abnormal-            Psychiatric:       [x] Normal Affect [x] No Hallucinations        [] Abnormal-     Other pertinent observable physical exam findings-     ASSESSMENT/PLAN:  1. Acute non-recurrent maxillary sinusitis- exam limited virtually, but most c/w dx. Will start amox, pred taper, and PRN Tessalon as below. RTO in 1 week if no significant improvement. - amoxicillin (AMOXIL) 500 MG capsule; Take 1 capsule by mouth 3 times daily for 7 days  Dispense: 21 capsule; Refill: 0  - predniSONE (DELTASONE) 10 MG tablet; Take 4 tablets daily for 2 days, then 3 tablets for 2 days, 2 tablets for 2 days, then 1 tablet for 2 days  Dispense: 20 tablet; Refill: 0  - benzonatate (TESSALON) 200 MG capsule; Take 1 capsule by mouth 3 times daily as needed for Cough  Dispense: 30 capsule; Refill: 0      Return if symptoms worsen or fail to improve. Marquis Jennings, was evaluated through a synchronous (real-time) audio-video encounter. The patient (or guardian if applicable) is aware that this is a billable service. Verbal consent to proceed has been obtained within the past 12 months.  The visit was conducted pursuant to the emergency declaration under the [address] [place] [address], [address] [place] [address] waiver authority and the [employer] Act. Patient identification was verified, and a caregiver was present when appropriate. The patient was located in a state where the provider was credentialed to provide care. Total time spent on this encounter: 15 minutes    --MIGUEL Stern CNP on 12/20/2021 at 4:26 PM    An electronic signature was used to authenticate this note.

## 2021-12-30 ENCOUNTER — TELEPHONE (OUTPATIENT)
Dept: INTERNAL MEDICINE CLINIC | Age: 76
End: 2021-12-30

## 2021-12-30 NOTE — TELEPHONE ENCOUNTER
Spoke with the pt she does not want to wait until Monday for an appt she is going to an urgent care.

## 2021-12-30 NOTE — TELEPHONE ENCOUNTER
----- Message from Abby Yi sent at 12/30/2021 12:23 PM EST -----  Subject: Appointment Request    Reason for Call: Routine (Patient Request) No Script    QUESTIONS  Type of Appointment? Established Patient  Reason for appointment request? Available appointments did not meet   patient need  Additional Information for Provider? pt needs an appointments asap for a   cough that has been continuing for almost 3 weeks   ---------------------------------------------------------------------------  --------------  CALL BACK INFO  What is the best way for the office to contact you? OK to leave message on   voicemail  Preferred Call Back Phone Number? [phone]  ---------------------------------------------------------------------------  --------------  SCRIPT ANSWERS  Relationship to Patient? Self  (Is the patient requesting to see the provider for a procedure?)? No  (Is the patient requesting to see the provider urgently  today or   tomorrow. )? No  Have you been diagnosed with, awaiting test results for, or told that you   are suspected of having COVID-19 (Coronavirus)? (If patient has tested   negative or was tested as a requirement for work, school, or travel and   not based on symptoms, answer no)? No  Within the past two weeks have you developed any of the following symptoms   (answer no if symptoms have been present longer than 2 weeks or began   more than 2 weeks ago)? Fever or Chills, Cough, Shortness of breath or   difficulty breathing, Loss of taste or smell, Sore throat, Nasal   congestion, Sneezing or runny nose, Fatigue or generalized body aches   (answer no if pain is specific to a body part e.g. back pain), Diarrhea,   Headache? No  Have you had close contact with someone with COVID-19 in the last 14 days? No  (Service Expert  click yes below to proceed with [facility]ual   Scheduling)?  Yes

## 2022-02-25 ENCOUNTER — OFFICE VISIT (OUTPATIENT)
Dept: INTERNAL MEDICINE CLINIC | Age: 77
End: 2022-02-25
Payer: MEDICARE

## 2022-02-25 VITALS
OXYGEN SATURATION: 95 % | WEIGHT: 134.25 LBS | HEIGHT: 60 IN | BODY MASS INDEX: 26.35 KG/M2 | HEART RATE: 77 BPM | DIASTOLIC BLOOD PRESSURE: 74 MMHG | SYSTOLIC BLOOD PRESSURE: 120 MMHG

## 2022-02-25 DIAGNOSIS — M51.36 DDD (DEGENERATIVE DISC DISEASE), LUMBAR: ICD-10-CM

## 2022-02-25 DIAGNOSIS — I10 ESSENTIAL HYPERTENSION: ICD-10-CM

## 2022-02-25 DIAGNOSIS — F41.9 ANXIETY: ICD-10-CM

## 2022-02-25 DIAGNOSIS — N18.30 STAGE 3 CHRONIC KIDNEY DISEASE, UNSPECIFIED WHETHER STAGE 3A OR 3B CKD (HCC): ICD-10-CM

## 2022-02-25 DIAGNOSIS — E78.2 MIXED HYPERLIPIDEMIA: ICD-10-CM

## 2022-02-25 DIAGNOSIS — L29.9 PRURITIC CONDITION: ICD-10-CM

## 2022-02-25 DIAGNOSIS — E53.8 B12 DEFICIENCY: ICD-10-CM

## 2022-02-25 DIAGNOSIS — I10 ESSENTIAL HYPERTENSION: Primary | ICD-10-CM

## 2022-02-25 LAB
A/G RATIO: 2.3 (ref 1.1–2.2)
ALBUMIN SERPL-MCNC: 4.3 G/DL (ref 3.4–5)
ALP BLD-CCNC: 94 U/L (ref 40–129)
ALT SERPL-CCNC: 20 U/L (ref 10–40)
ANION GAP SERPL CALCULATED.3IONS-SCNC: 12 MMOL/L (ref 3–16)
AST SERPL-CCNC: 20 U/L (ref 15–37)
BASOPHILS ABSOLUTE: 0 K/UL (ref 0–0.2)
BASOPHILS RELATIVE PERCENT: 0.9 %
BILIRUB SERPL-MCNC: 0.7 MG/DL (ref 0–1)
BUN BLDV-MCNC: 24 MG/DL (ref 7–20)
CALCIUM SERPL-MCNC: 9.2 MG/DL (ref 8.3–10.6)
CHLORIDE BLD-SCNC: 104 MMOL/L (ref 99–110)
CHOLESTEROL, TOTAL: 192 MG/DL (ref 0–199)
CO2: 24 MMOL/L (ref 21–32)
CREAT SERPL-MCNC: 1.3 MG/DL (ref 0.6–1.2)
EOSINOPHILS ABSOLUTE: 0.2 K/UL (ref 0–0.6)
EOSINOPHILS RELATIVE PERCENT: 3.1 %
GFR AFRICAN AMERICAN: 48
GFR NON-AFRICAN AMERICAN: 40
GLUCOSE BLD-MCNC: 95 MG/DL (ref 70–99)
HCT VFR BLD CALC: 37.4 % (ref 36–48)
HDLC SERPL-MCNC: 59 MG/DL (ref 40–60)
HEMOGLOBIN: 13 G/DL (ref 12–16)
LDL CHOLESTEROL CALCULATED: 109 MG/DL
LYMPHOCYTES ABSOLUTE: 0.7 K/UL (ref 1–5.1)
LYMPHOCYTES RELATIVE PERCENT: 13.2 %
MCH RBC QN AUTO: 31.5 PG (ref 26–34)
MCHC RBC AUTO-ENTMCNC: 34.7 G/DL (ref 31–36)
MCV RBC AUTO: 91 FL (ref 80–100)
MONOCYTES ABSOLUTE: 0.6 K/UL (ref 0–1.3)
MONOCYTES RELATIVE PERCENT: 11.3 %
NEUTROPHILS ABSOLUTE: 3.7 K/UL (ref 1.7–7.7)
NEUTROPHILS RELATIVE PERCENT: 71.5 %
PDW BLD-RTO: 14.1 % (ref 12.4–15.4)
PLATELET # BLD: 176 K/UL (ref 135–450)
PMV BLD AUTO: 9.2 FL (ref 5–10.5)
POTASSIUM SERPL-SCNC: 4.5 MMOL/L (ref 3.5–5.1)
RBC # BLD: 4.11 M/UL (ref 4–5.2)
SODIUM BLD-SCNC: 140 MMOL/L (ref 136–145)
TOTAL PROTEIN: 6.2 G/DL (ref 6.4–8.2)
TRIGL SERPL-MCNC: 120 MG/DL (ref 0–150)
VITAMIN B-12: 1745 PG/ML (ref 211–911)
VLDLC SERPL CALC-MCNC: 24 MG/DL
WBC # BLD: 5.2 K/UL (ref 4–11)

## 2022-02-25 PROCEDURE — 1036F TOBACCO NON-USER: CPT | Performed by: INTERNAL MEDICINE

## 2022-02-25 PROCEDURE — 99214 OFFICE O/P EST MOD 30 MIN: CPT | Performed by: INTERNAL MEDICINE

## 2022-02-25 PROCEDURE — G8399 PT W/DXA RESULTS DOCUMENT: HCPCS | Performed by: INTERNAL MEDICINE

## 2022-02-25 PROCEDURE — G8427 DOCREV CUR MEDS BY ELIG CLIN: HCPCS | Performed by: INTERNAL MEDICINE

## 2022-02-25 PROCEDURE — 1090F PRES/ABSN URINE INCON ASSESS: CPT | Performed by: INTERNAL MEDICINE

## 2022-02-25 PROCEDURE — 1123F ACP DISCUSS/DSCN MKR DOCD: CPT | Performed by: INTERNAL MEDICINE

## 2022-02-25 PROCEDURE — 4040F PNEUMOC VAC/ADMIN/RCVD: CPT | Performed by: INTERNAL MEDICINE

## 2022-02-25 PROCEDURE — G8417 CALC BMI ABV UP PARAM F/U: HCPCS | Performed by: INTERNAL MEDICINE

## 2022-02-25 PROCEDURE — G8484 FLU IMMUNIZE NO ADMIN: HCPCS | Performed by: INTERNAL MEDICINE

## 2022-02-25 PROCEDURE — 36415 COLL VENOUS BLD VENIPUNCTURE: CPT | Performed by: INTERNAL MEDICINE

## 2022-02-25 RX ORDER — TRAMADOL HYDROCHLORIDE 50 MG/1
25 TABLET ORAL 2 TIMES DAILY PRN
Qty: 90 TABLET | Refills: 1 | Status: SHIPPED
Start: 2022-02-25 | End: 2022-04-25 | Stop reason: SINTOL

## 2022-02-25 RX ORDER — ESCITALOPRAM OXALATE 10 MG/1
10 TABLET ORAL EVERY MORNING
Qty: 90 TABLET | Refills: 1 | Status: SHIPPED | OUTPATIENT
Start: 2022-02-25 | End: 2022-06-08 | Stop reason: SDUPTHER

## 2022-02-25 RX ORDER — MELOXICAM 7.5 MG/1
7.5 TABLET ORAL DAILY
COMMUNITY
End: 2022-02-25

## 2022-02-25 RX ORDER — HYDROCHLOROTHIAZIDE 12.5 MG/1
12.5 CAPSULE, GELATIN COATED ORAL EVERY MORNING
Qty: 90 CAPSULE | Refills: 1 | Status: SHIPPED | OUTPATIENT
Start: 2022-02-25 | End: 2022-06-08 | Stop reason: SDUPTHER

## 2022-02-25 SDOH — ECONOMIC STABILITY: HOUSING INSECURITY
IN THE LAST 12 MONTHS, WAS THERE A TIME WHEN YOU DID NOT HAVE A STEADY PLACE TO SLEEP OR SLEPT IN A SHELTER (INCLUDING NOW)?: NO

## 2022-02-25 SDOH — HEALTH STABILITY: PHYSICAL HEALTH: ON AVERAGE, HOW MANY DAYS PER WEEK DO YOU ENGAGE IN MODERATE TO STRENUOUS EXERCISE (LIKE A BRISK WALK)?: 0 DAYS

## 2022-02-25 SDOH — HEALTH STABILITY: PHYSICAL HEALTH: ON AVERAGE, HOW MANY MINUTES DO YOU ENGAGE IN EXERCISE AT THIS LEVEL?: 0 MIN

## 2022-02-25 SDOH — ECONOMIC STABILITY: FOOD INSECURITY: WITHIN THE PAST 12 MONTHS, THE FOOD YOU BOUGHT JUST DIDN'T LAST AND YOU DIDN'T HAVE MONEY TO GET MORE.: NEVER TRUE

## 2022-02-25 SDOH — ECONOMIC STABILITY: TRANSPORTATION INSECURITY
IN THE PAST 12 MONTHS, HAS THE LACK OF TRANSPORTATION KEPT YOU FROM MEDICAL APPOINTMENTS OR FROM GETTING MEDICATIONS?: NO

## 2022-02-25 SDOH — ECONOMIC STABILITY: FOOD INSECURITY: WITHIN THE PAST 12 MONTHS, YOU WORRIED THAT YOUR FOOD WOULD RUN OUT BEFORE YOU GOT MONEY TO BUY MORE.: NEVER TRUE

## 2022-02-25 SDOH — ECONOMIC STABILITY: INCOME INSECURITY: IN THE LAST 12 MONTHS, WAS THERE A TIME WHEN YOU WERE NOT ABLE TO PAY THE MORTGAGE OR RENT ON TIME?: NO

## 2022-02-25 SDOH — ECONOMIC STABILITY: HOUSING INSECURITY: IN THE LAST 12 MONTHS, HOW MANY PLACES HAVE YOU LIVED?: 1

## 2022-02-25 SDOH — ECONOMIC STABILITY: TRANSPORTATION INSECURITY
IN THE PAST 12 MONTHS, HAS LACK OF TRANSPORTATION KEPT YOU FROM MEETINGS, WORK, OR FROM GETTING THINGS NEEDED FOR DAILY LIVING?: NO

## 2022-02-25 ASSESSMENT — SOCIAL DETERMINANTS OF HEALTH (SDOH)
WITHIN THE LAST YEAR, HAVE YOU BEEN HUMILIATED OR EMOTIONALLY ABUSED IN OTHER WAYS BY YOUR PARTNER OR EX-PARTNER?: NO
HOW HARD IS IT FOR YOU TO PAY FOR THE VERY BASICS LIKE FOOD, HOUSING, MEDICAL CARE, AND HEATING?: NOT HARD AT ALL
WITHIN THE LAST YEAR, HAVE YOU BEEN AFRAID OF YOUR PARTNER OR EX-PARTNER?: NO
WITHIN THE LAST YEAR, HAVE TO BEEN RAPED OR FORCED TO HAVE ANY KIND OF SEXUAL ACTIVITY BY YOUR PARTNER OR EX-PARTNER?: NO
WITHIN THE LAST YEAR, HAVE YOU BEEN KICKED, HIT, SLAPPED, OR OTHERWISE PHYSICALLY HURT BY YOUR PARTNER OR EX-PARTNER?: NO

## 2022-02-25 ASSESSMENT — LIFESTYLE VARIABLES
HOW OFTEN DO YOU HAVE A DRINK CONTAINING ALCOHOL: 2-4 TIMES A MONTH
HOW MANY STANDARD DRINKS CONTAINING ALCOHOL DO YOU HAVE ON A TYPICAL DAY: 3 OR 4

## 2022-02-25 NOTE — PROGRESS NOTES
Laurence Rea  1945 02/25/22    SUBJECTIVE:    Hypertension: Stable. Denies CP, SOB, cough, visual changes, dizziness, palpitations or HA. CKD, last creat elevated in Aug and we'll recheck now. IS ON  MOBIC, WE MAY NEED TO RESTRICT USE. HAD BEEN ON CELEBREX PREVIOUSLY. HAD SOMNOLENCE ON TRAMADOL BUT WE'LL RETRY FOR HER LBP. SOME RECURRING SHOULDER PROBLEMS,     Mood stable on LEXAPRO w/o current anxiety, depression or panic attacks. PRIOR PRURITIS RESOLVED OFF CELEBREX    b12 defic noted last time and now on supplement, we'll recheck lab. No results found for: LABA1C  Lab Results   Component Value Date    GLUF 79 05/28/2019    LABMICR Not Indicated 06/06/2019    LDLCALC 137 (H) 08/25/2021    CREATININE 1.5 (H) 08/25/2021         OBJECTIVE:    /74   Pulse 77   Ht 5' (1.524 m)   Wt 134 lb 4 oz (60.9 kg)   LMP  (LMP Unknown)   SpO2 95%   BMI 26.22 kg/m²     Physical Exam  Vitals reviewed. Constitutional:       Appearance: She is well-developed. HENT:      Head: Normocephalic and atraumatic. Eyes:      General: No scleral icterus. Right eye: No discharge. Left eye: No discharge. Conjunctiva/sclera: Conjunctivae normal.      Pupils: Pupils are equal, round, and reactive to light. Neck:      Thyroid: No thyromegaly. Vascular: No JVD. Trachea: No tracheal deviation. Cardiovascular:      Rate and Rhythm: Normal rate and regular rhythm. Heart sounds: Normal heart sounds. No murmur heard. No friction rub. No gallop. Pulmonary:      Effort: Pulmonary effort is normal. No respiratory distress. Breath sounds: Normal breath sounds. No wheezing or rales. Abdominal:      General: Bowel sounds are normal. There is no distension. Palpations: Abdomen is soft. There is no mass. Tenderness: There is no abdominal tenderness. There is no guarding or rebound. Hernia: No hernia is present.    Musculoskeletal:      Cervical back: Neck supple. Lymphadenopathy:      Cervical: No cervical adenopathy. Skin:     General: Skin is warm and dry. Neurological:      Mental Status: She is alert. Psychiatric:         Mood and Affect: Mood normal.         ASSESSMENT:    1. Essential hypertension    2. Mixed hyperlipidemia    3. Anxiety    4. Stage 3 chronic kidney disease, unspecified whether stage 3a or 3b CKD ([facility]    5. DDD (degenerative disc disease), lumbar    6. Pruritic condition    7. B12 deficiency        PLAN:    Brando Lezama was seen today for 6 month follow-up. Diagnoses and all orders for this visit:    Essential hypertension - Blood pressure stable and will continue current regimen. Will plan periodic monitoring of renal function, electrolytes, lipid profile. -     hydroCHLOROthiazide (MICROZIDE) 12.5 MG capsule; Take 1 capsule by mouth every morning  -     Comprehensive Metabolic Panel; Future  -     CBC with Auto Differential; Future  -     Lipid Panel; Future    Mixed hyperlipidemia  - Pt will continue to work on a low fat diet and also exercise, wt loss as appropriate. Will continue periodic monitoring of fasting lipid profile, glucose, liver function. -     Comprehensive Metabolic Panel; Future  -     CBC with Auto Differential; Future  -     Lipid Panel; Future    Anxiety - Mood stable on current regimen w/o any significant manifestations of severe depression or anxiety noted at this time. Cont current meds. -     escitalopram (LEXAPRO) 10 MG tablet; Take 1 tablet by mouth every morning    Stage 3 chronic kidney disease, unspecified whether stage 3a or 3b CKD ([facility]- monitor, advised no nsaids  -     Comprehensive Metabolic Panel; Future    DDD (degenerative disc disease), lumbar- w CKD we'll stop all nsaids and try prn tramadol and prn tylenol instead  -     traMADol (ULTRAM) 50 MG tablet; Take 0.5 tablets by mouth 2 times daily as needed for Pain for up to 90 days. Intended supply: 90 days.  Take lowest dose possible to manage pain    Pruritic condition- resolved off celebrex    B12 deficiency- on supplement and f/u levels  -     Vitamin B12; Future

## 2022-02-27 NOTE — RESULT ENCOUNTER NOTE
Call pt, labs ok/chol ok W TOTAL CHOL DROPPING FROM [phone], HER VIT B12 LEVEL IS NOW IMPROVED AND SL HIGH. IS SAFE THAT SHE'S HIGHER THAN NL RANGE BUT NOW REC TO CUT BACK ON HER B12 SUPPLEMENT FROM DAILY TO 3X/WEEK.   THANKS

## 2022-02-28 DIAGNOSIS — J01.00 ACUTE NON-RECURRENT MAXILLARY SINUSITIS: ICD-10-CM

## 2022-02-28 RX ORDER — METHYLPREDNISOLONE 4 MG/1
TABLET ORAL
Qty: 1 KIT | Refills: 0 | Status: SHIPPED | OUTPATIENT
Start: 2022-02-28 | End: 2022-03-06

## 2022-03-08 ENCOUNTER — TELEMEDICINE (OUTPATIENT)
Dept: INTERNAL MEDICINE CLINIC | Age: 77
End: 2022-03-08
Payer: MEDICARE

## 2022-03-08 DIAGNOSIS — J01.00 ACUTE NON-RECURRENT MAXILLARY SINUSITIS: Primary | ICD-10-CM

## 2022-03-08 PROCEDURE — G8417 CALC BMI ABV UP PARAM F/U: HCPCS | Performed by: INTERNAL MEDICINE

## 2022-03-08 PROCEDURE — G8427 DOCREV CUR MEDS BY ELIG CLIN: HCPCS | Performed by: INTERNAL MEDICINE

## 2022-03-08 PROCEDURE — 99213 OFFICE O/P EST LOW 20 MIN: CPT | Performed by: INTERNAL MEDICINE

## 2022-03-08 PROCEDURE — 1036F TOBACCO NON-USER: CPT | Performed by: INTERNAL MEDICINE

## 2022-03-08 PROCEDURE — 4040F PNEUMOC VAC/ADMIN/RCVD: CPT | Performed by: INTERNAL MEDICINE

## 2022-03-08 PROCEDURE — 1090F PRES/ABSN URINE INCON ASSESS: CPT | Performed by: INTERNAL MEDICINE

## 2022-03-08 PROCEDURE — G8484 FLU IMMUNIZE NO ADMIN: HCPCS | Performed by: INTERNAL MEDICINE

## 2022-03-08 PROCEDURE — 1123F ACP DISCUSS/DSCN MKR DOCD: CPT | Performed by: INTERNAL MEDICINE

## 2022-03-08 PROCEDURE — G8399 PT W/DXA RESULTS DOCUMENT: HCPCS | Performed by: INTERNAL MEDICINE

## 2022-03-08 RX ORDER — AMOXICILLIN AND CLAVULANATE POTASSIUM 875; 125 MG/1; MG/1
1 TABLET, FILM COATED ORAL 2 TIMES DAILY
Qty: 20 TABLET | Refills: 0 | Status: SHIPPED | OUTPATIENT
Start: 2022-03-08 | End: 2022-03-18

## 2022-03-08 RX ORDER — PREDNISONE 1 MG/1
TABLET ORAL
Qty: 21 TABLET | Refills: 0 | Status: SHIPPED | OUTPATIENT
Start: 2022-03-08 | End: 2022-04-25 | Stop reason: ALTCHOICE

## 2022-03-08 ASSESSMENT — PATIENT HEALTH QUESTIONNAIRE - PHQ9
SUM OF ALL RESPONSES TO PHQ QUESTIONS 1-9: 0
SUM OF ALL RESPONSES TO PHQ QUESTIONS 1-9: 0
2. FEELING DOWN, DEPRESSED OR HOPELESS: 0
SUM OF ALL RESPONSES TO PHQ QUESTIONS 1-9: 0
SUM OF ALL RESPONSES TO PHQ9 QUESTIONS 1 & 2: 0
1. LITTLE INTEREST OR PLEASURE IN DOING THINGS: 0
SUM OF ALL RESPONSES TO PHQ QUESTIONS 1-9: 0

## 2022-03-08 NOTE — PROGRESS NOTES
3/8/2022    TELEHEALTH EVALUATION -- Audio/Visual (During [facility] public health emergency)    HPI:    Ricardo Ahn (:  1945) has requested an audio/video evaluation for the following concern(s):    The past week Lissy lyman sinus congestion and drainage, ears stuffy, minimal cough. Kate Gates also ill. She has had low gr fever too. Drainage green or yellow. No aches or SOB. Review of Systems    Prior to Visit Medications    Medication Sig Taking? Authorizing Provider   escitalopram (LEXAPRO) 10 MG tablet Take 1 tablet by mouth every morning Yes Edward Greco MD   hydroCHLOROthiazide (MICROZIDE) 12.5 MG capsule Take 1 capsule by mouth every morning Yes Edward Greco MD   traMADol (ULTRAM) 50 MG tablet Take 0.5 tablets by mouth 2 times daily as needed for Pain for up to 90 days. Intended supply: 90 days. Take lowest dose possible to manage pain Yes Edward Greco MD   vitamin B-12 (CYANOCOBALAMIN) 500 MCG tablet Take 1 tablet by mouth daily Yes Edward Greco MD   Esomeprazole Magnesium (NEXIUM PO) Take by mouth Yes Historical Provider, MD       Social History     Tobacco Use    Smoking status: Former Smoker     Packs/day: 1.00     Years: 7.00     Pack years: 7.00     Quit date: 1969     Years since quittin.7    Smokeless tobacco: Never Used   Vaping Use    Vaping Use: Never used   Substance Use Topics    Alcohol use:  Yes     Alcohol/week: 3.0 standard drinks     Types: 3 Glasses of wine per week     Comment: Wine 2-3 times a week    Drug use: No          PHYSICAL EXAMINATION:  [ INSTRUCTIONS:  \"[x]\" Indicates a positive item  \"[]\" Indicates a negative item  -- DELETE ALL ITEMS NOT EXAMINED]  Vital Signs: (As obtained by patient/caregiver or practitioner observation)    Blood pressure-  Heart rate-    Respiratory rate-    Temperature-  Pulse oximetry-     Constitutional: [x] Appears well-developed and well-nourished [] No apparent distress      [] Abnormal-   Mental status  [] Alert and awake  [] Oriented to person/place/time []Able to follow commands      Eyes:  EOM    []  Normal  [] Abnormal-  Sclera  []  Normal  [] Abnormal -         Discharge []  None visible  [] Abnormal -    HENT:   [] Normocephalic, atraumatic. [] Abnormal   [] Mouth/Throat: Mucous membranes are moist.     External Ears [] Normal  [] Abnormal-     Neck: [] No visualized mass     Pulmonary/Chest: [x] Respiratory effort normal.  [] No visualized signs of difficulty breathing or respiratory distress        [] Abnormal-      Musculoskeletal:   [] Normal gait with no signs of ataxia         [] Normal range of motion of neck        [] Abnormal-       Neurological:        [] No Facial Asymmetry (Cranial nerve 7 motor function) (limited exam to video visit)          [] No gaze palsy        [] Abnormal-         Skin:        [] No significant exanthematous lesions or discoloration noted on facial skin         [] Abnormal-            Psychiatric:       [] Normal Affect [] No Hallucinations        [] Abnormal-     Other pertinent observable physical exam findings-     ASSESSMENT/PLAN:  1. Acute non-recurrent maxillary sinusitis  Consistent w presentation, rec rx as below and fluids, rest.  Pt to call back one week if not improving.      - amoxicillin-clavulanate (AUGMENTIN) 875-125 MG per tablet; Take 1 tablet by mouth 2 times daily for 10 days  Dispense: 20 tablet; Refill: 0  - predniSONE (DELTASONE) 5 MG tablet; Take 6 tablets by mouth on day 1, 5 on day 2, 4 on day 3, 3 on day 4, 2 on day 5, 1 on day 6. Dispense: 21 tablet; Refill: 0      Return if symptoms worsen or fail to improve. Jonna James, was evaluated through a synchronous (real-time) audio-video encounter. The patient (or guardian if applicable) is aware that this is a billable service, which includes applicable co-pays. This Virtual Visit was conducted with patient's (and/or legal guardian's) consent.  The visit was conducted pursuant to the

## 2022-03-12 DIAGNOSIS — L29.9 PRURITIC CONDITION: ICD-10-CM

## 2022-03-12 DIAGNOSIS — I10 ESSENTIAL HYPERTENSION: ICD-10-CM

## 2022-03-14 RX ORDER — MELOXICAM 15 MG/1
TABLET ORAL
Qty: 30 TABLET | Refills: 3 | OUTPATIENT
Start: 2022-03-14

## 2022-03-21 ENCOUNTER — OFFICE VISIT (OUTPATIENT)
Dept: INTERNAL MEDICINE CLINIC | Age: 77
End: 2022-03-21
Payer: MEDICARE

## 2022-03-21 VITALS
DIASTOLIC BLOOD PRESSURE: 68 MMHG | SYSTOLIC BLOOD PRESSURE: 120 MMHG | RESPIRATION RATE: 18 BRPM | HEIGHT: 60 IN | BODY MASS INDEX: 26.35 KG/M2 | HEART RATE: 92 BPM | OXYGEN SATURATION: 93 % | WEIGHT: 134.25 LBS

## 2022-03-21 DIAGNOSIS — N39.41 URGE INCONTINENCE OF URINE: ICD-10-CM

## 2022-03-21 DIAGNOSIS — N30.00 ACUTE CYSTITIS WITHOUT HEMATURIA: Primary | ICD-10-CM

## 2022-03-21 DIAGNOSIS — R30.0 DYSURIA: ICD-10-CM

## 2022-03-21 DIAGNOSIS — E53.8 B12 DEFICIENCY: ICD-10-CM

## 2022-03-21 LAB
BILIRUBIN, POC: NORMAL
BLOOD URINE, POC: NORMAL
CLARITY, POC: NORMAL
COLOR, POC: YELLOW
GLUCOSE URINE, POC: NORMAL
KETONES, POC: NORMAL
LEUKOCYTE EST, POC: NORMAL
NITRITE, POC: NORMAL
PH, POC: 6.5
PROTEIN, POC: NORMAL
SPECIFIC GRAVITY, POC: 1.02
UROBILINOGEN, POC: 0.2
VITAMIN B-12: 968 PG/ML (ref 211–911)

## 2022-03-21 PROCEDURE — 1090F PRES/ABSN URINE INCON ASSESS: CPT | Performed by: INTERNAL MEDICINE

## 2022-03-21 PROCEDURE — 1036F TOBACCO NON-USER: CPT | Performed by: INTERNAL MEDICINE

## 2022-03-21 PROCEDURE — 36415 COLL VENOUS BLD VENIPUNCTURE: CPT | Performed by: INTERNAL MEDICINE

## 2022-03-21 PROCEDURE — 81002 URINALYSIS NONAUTO W/O SCOPE: CPT | Performed by: INTERNAL MEDICINE

## 2022-03-21 PROCEDURE — 99213 OFFICE O/P EST LOW 20 MIN: CPT | Performed by: INTERNAL MEDICINE

## 2022-03-21 PROCEDURE — 4040F PNEUMOC VAC/ADMIN/RCVD: CPT | Performed by: INTERNAL MEDICINE

## 2022-03-21 PROCEDURE — 1123F ACP DISCUSS/DSCN MKR DOCD: CPT | Performed by: INTERNAL MEDICINE

## 2022-03-21 PROCEDURE — G8427 DOCREV CUR MEDS BY ELIG CLIN: HCPCS | Performed by: INTERNAL MEDICINE

## 2022-03-21 PROCEDURE — G8484 FLU IMMUNIZE NO ADMIN: HCPCS | Performed by: INTERNAL MEDICINE

## 2022-03-21 PROCEDURE — G8399 PT W/DXA RESULTS DOCUMENT: HCPCS | Performed by: INTERNAL MEDICINE

## 2022-03-21 PROCEDURE — 0509F URINE INCON PLAN DOCD: CPT | Performed by: INTERNAL MEDICINE

## 2022-03-21 PROCEDURE — G8417 CALC BMI ABV UP PARAM F/U: HCPCS | Performed by: INTERNAL MEDICINE

## 2022-03-21 RX ORDER — CIPROFLOXACIN 250 MG/1
250 TABLET, FILM COATED ORAL 2 TIMES DAILY
Qty: 14 TABLET | Refills: 0 | Status: SHIPPED | OUTPATIENT
Start: 2022-03-21 | End: 2022-03-28

## 2022-03-21 RX ORDER — TOLTERODINE TARTRATE 1 MG/1
1 TABLET, EXTENDED RELEASE ORAL 2 TIMES DAILY
Qty: 180 TABLET | Refills: 1 | Status: SHIPPED | OUTPATIENT
Start: 2022-03-21 | End: 2022-06-08 | Stop reason: SDUPTHER

## 2022-03-21 NOTE — PROGRESS NOTES
Jonna James  1945 03/21/22    SUBJECTIVE:  Some pelvic cramping and pain, dysuria and incr frequency the past few days. Not been regular w water intake. b12 level sl high so decr supplement now fr daily to 2x/week    OBJECTIVE:    /68   Pulse 92   Resp 18   Ht 5' (1.524 m)   Wt 134 lb 4 oz (60.9 kg)   LMP  (LMP Unknown)   SpO2 93%   BMI 26.22 kg/m²     Physical Exam  Vitals reviewed. Constitutional:       Appearance: She is well-developed. Cardiovascular:      Rate and Rhythm: Normal rate and regular rhythm. Heart sounds: Normal heart sounds. No murmur heard. No friction rub. No gallop. Pulmonary:      Effort: Pulmonary effort is normal. No respiratory distress. Breath sounds: Normal breath sounds. No wheezing or rales. Abdominal:      General: Bowel sounds are normal. There is no distension. Palpations: Abdomen is soft. Tenderness: There is abdominal tenderness (MILD SUPRAPUBIC REGION). Musculoskeletal:      Cervical back: Neck supple. Neurological:      Mental Status: She is alert. ASSESSMENT:    1. Acute cystitis without hematuria    2. Dysuria    3. B12 deficiency    4. Urge incontinence of urine        PLAN:    Yadira Glover was seen today for urinary tract infection, bloated and lower back pain. Diagnoses and all orders for this visit:    Acute cystitis without hematuria - Will rx UTI w atbs as noted, also encouraged pushing fluids. To call back one week if sx persist.    -     ciprofloxacin (CIPRO) 250 MG tablet; Take 1 tablet by mouth 2 times daily for 7 days    Dysuria  -     POCT Urinalysis no Micro  -     ciprofloxacin (CIPRO) 250 MG tablet;  Take 1 tablet by mouth 2 times daily for 7 days    B12 deficiency- DECREASED SUPPLEMENT TO 2X/WEEK, RECHECK B12 LEVELS IN A COUPLE OF MONTHS  -     Vitamin B12; Future    Urge incontinence of urine- TRIAL RX BUT IF SX PERSIST THEN MAY NEED UROLOGY EVALUATION  -     tolterodine (DETROL) 1 MG tablet; Take 1 tablet by mouth 2 times daily

## 2022-04-25 ENCOUNTER — TELEPHONE (OUTPATIENT)
Dept: INTERNAL MEDICINE CLINIC | Age: 77
End: 2022-04-25

## 2022-04-25 DIAGNOSIS — I10 ESSENTIAL HYPERTENSION: Primary | ICD-10-CM

## 2022-04-25 DIAGNOSIS — M19.90 ARTHRITIS: ICD-10-CM

## 2022-04-25 RX ORDER — ETODOLAC 400 MG/1
400 TABLET, FILM COATED ORAL DAILY PRN
Qty: 90 TABLET | Refills: 1 | Status: SHIPPED | OUTPATIENT
Start: 2022-04-25 | End: 2022-06-08

## 2022-04-25 NOTE — TELEPHONE ENCOUNTER
Patient calls asking for alternative for Tramadol for arthritis pain. She said the Tramadol upsets her stomach too much. She has tried Celebrex in the past but caused adverse reaction and she has also tried Mobic but she was told she can't take it anymore. Please advise.

## 2022-05-20 DIAGNOSIS — M54.16 LEFT LUMBAR RADICULOPATHY: ICD-10-CM

## 2022-05-20 DIAGNOSIS — M51.36 DDD (DEGENERATIVE DISC DISEASE), LUMBAR: Primary | ICD-10-CM

## 2022-06-08 ENCOUNTER — OFFICE VISIT (OUTPATIENT)
Dept: INTERNAL MEDICINE CLINIC | Age: 77
End: 2022-06-08
Payer: MEDICARE

## 2022-06-08 VITALS
OXYGEN SATURATION: 98 % | BODY MASS INDEX: 25.97 KG/M2 | RESPIRATION RATE: 12 BRPM | SYSTOLIC BLOOD PRESSURE: 122 MMHG | HEART RATE: 73 BPM | DIASTOLIC BLOOD PRESSURE: 76 MMHG | WEIGHT: 133 LBS

## 2022-06-08 DIAGNOSIS — M19.90 ARTHRITIS: ICD-10-CM

## 2022-06-08 DIAGNOSIS — R68.89 FLU-LIKE SYMPTOMS: ICD-10-CM

## 2022-06-08 DIAGNOSIS — F41.9 ANXIETY: ICD-10-CM

## 2022-06-08 DIAGNOSIS — I10 ESSENTIAL HYPERTENSION: ICD-10-CM

## 2022-06-08 DIAGNOSIS — N18.31 STAGE 3A CHRONIC KIDNEY DISEASE (HCC): ICD-10-CM

## 2022-06-08 DIAGNOSIS — E78.2 MIXED HYPERLIPIDEMIA: ICD-10-CM

## 2022-06-08 DIAGNOSIS — N39.41 URGE INCONTINENCE OF URINE: ICD-10-CM

## 2022-06-08 DIAGNOSIS — M51.36 DDD (DEGENERATIVE DISC DISEASE), LUMBAR: Primary | ICD-10-CM

## 2022-06-08 DIAGNOSIS — R30.0 DYSURIA: ICD-10-CM

## 2022-06-08 DIAGNOSIS — N18.30 STAGE 3 CHRONIC KIDNEY DISEASE, UNSPECIFIED WHETHER STAGE 3A OR 3B CKD (HCC): ICD-10-CM

## 2022-06-08 PROCEDURE — 1036F TOBACCO NON-USER: CPT | Performed by: INTERNAL MEDICINE

## 2022-06-08 PROCEDURE — 1123F ACP DISCUSS/DSCN MKR DOCD: CPT | Performed by: INTERNAL MEDICINE

## 2022-06-08 PROCEDURE — 36415 COLL VENOUS BLD VENIPUNCTURE: CPT | Performed by: INTERNAL MEDICINE

## 2022-06-08 PROCEDURE — 1090F PRES/ABSN URINE INCON ASSESS: CPT | Performed by: INTERNAL MEDICINE

## 2022-06-08 PROCEDURE — G8427 DOCREV CUR MEDS BY ELIG CLIN: HCPCS | Performed by: INTERNAL MEDICINE

## 2022-06-08 PROCEDURE — G8417 CALC BMI ABV UP PARAM F/U: HCPCS | Performed by: INTERNAL MEDICINE

## 2022-06-08 PROCEDURE — G8399 PT W/DXA RESULTS DOCUMENT: HCPCS | Performed by: INTERNAL MEDICINE

## 2022-06-08 PROCEDURE — 99214 OFFICE O/P EST MOD 30 MIN: CPT | Performed by: INTERNAL MEDICINE

## 2022-06-08 PROCEDURE — 81002 URINALYSIS NONAUTO W/O SCOPE: CPT | Performed by: INTERNAL MEDICINE

## 2022-06-08 PROCEDURE — 0509F URINE INCON PLAN DOCD: CPT | Performed by: INTERNAL MEDICINE

## 2022-06-08 RX ORDER — HYDROCHLOROTHIAZIDE 12.5 MG/1
12.5 CAPSULE, GELATIN COATED ORAL EVERY MORNING
Qty: 90 CAPSULE | Refills: 1 | Status: SHIPPED | OUTPATIENT
Start: 2022-06-08 | End: 2022-10-28 | Stop reason: SDUPTHER

## 2022-06-08 RX ORDER — CIPROFLOXACIN 250 MG/1
250 TABLET, FILM COATED ORAL 2 TIMES DAILY
Qty: 10 TABLET | Refills: 0 | Status: SHIPPED | OUTPATIENT
Start: 2022-06-08 | End: 2022-06-13

## 2022-06-08 RX ORDER — ESCITALOPRAM OXALATE 10 MG/1
10 TABLET ORAL EVERY MORNING
Qty: 90 TABLET | Refills: 1 | Status: SHIPPED | OUTPATIENT
Start: 2022-06-08 | End: 2022-10-28 | Stop reason: SDUPTHER

## 2022-06-08 RX ORDER — ETODOLAC 400 MG/1
400 TABLET, FILM COATED ORAL DAILY PRN
Qty: 90 TABLET | Refills: 1 | Status: CANCELLED | OUTPATIENT
Start: 2022-06-08 | End: 2023-06-08

## 2022-06-08 RX ORDER — TOLTERODINE TARTRATE 1 MG/1
1 TABLET, EXTENDED RELEASE ORAL 2 TIMES DAILY
Qty: 180 TABLET | Refills: 1 | Status: SHIPPED | OUTPATIENT
Start: 2022-06-08

## 2022-06-08 RX ORDER — GABAPENTIN 100 MG/1
100 CAPSULE ORAL 2 TIMES DAILY PRN
Qty: 60 CAPSULE | Refills: 1 | Status: SHIPPED | OUTPATIENT
Start: 2022-06-08 | End: 2022-07-08

## 2022-06-08 NOTE — PROGRESS NOTES
Hunter Mckenzie  1945 06/08/22    SUBJECTIVE:    SOME PELVIC FULLNESS NOTED PAST FEW DAYS, NO FEVER OR CHILLS. UA TR POSITIVE    LUMBAR DDD, BILAT SCIATICA, HAS NOT HAD BENEFIT FR LODINE AND W HER MILD CKD ALSO WE'LL STOP THIS. HAS HAD A CAUDAL INJ W DR VILLAGOMEZ THIS WEEK AND FOR ONE MORE.  NOT TRIED NEURONTIN, WE'LL TRY PRN NEURONTIN. Controlled substances monitoring: possible medication side effects, risk of tolerance and/or dependence, and alternative treatments discussed, no signs of potential drug abuse or diversion identified and OARRS report reviewed today- activity consistent with treatment plan. Mood stable on LEXAPRO w/o current anxiety, depression or panic attacks. Hypertension: Stable. Denies CP, SOB, cough, visual changes, dizziness, palpitations or HA. GERD:  Is without sx of heartburn or dysphagia, abd pain. PERSISTENT COUGH NOTED AND SOME CONGESTION SINCE A SEVERE BRONCHITIS EPISODE LAST DEC. Lipids:  Has history of high cholesterol, not on medication but trying to continue regular low fat diet and maintenance of weight, regular exercise. OBJECTIVE:    /76   Pulse 73   Resp 12   Wt 133 lb (60.3 kg)   LMP  (LMP Unknown)   SpO2 98%   BMI 25.97 kg/m²     Physical Exam  Vitals reviewed. Constitutional:       Appearance: She is well-developed. HENT:      Head: Normocephalic and atraumatic. Eyes:      General: No scleral icterus. Right eye: No discharge. Left eye: No discharge. Conjunctiva/sclera: Conjunctivae normal.      Pupils: Pupils are equal, round, and reactive to light. Neck:      Thyroid: No thyromegaly. Vascular: No JVD. Trachea: No tracheal deviation. Cardiovascular:      Rate and Rhythm: Normal rate and regular rhythm. Heart sounds: Normal heart sounds. No murmur heard. No friction rub. No gallop. Pulmonary:      Effort: Pulmonary effort is normal. No respiratory distress.       Breath sounds: Normal breath sounds. No wheezing or rales. Abdominal:      General: Bowel sounds are normal. There is no distension. Palpations: Abdomen is soft. There is no mass. Tenderness: There is no abdominal tenderness. There is no guarding or rebound. Hernia: No hernia is present. Musculoskeletal:      Cervical back: Neck supple. Lymphadenopathy:      Cervical: No cervical adenopathy. Skin:     General: Skin is warm and dry. Neurological:      Mental Status: She is alert. Psychiatric:         Mood and Affect: Mood normal.         ASSESSMENT:    1. DDD (degenerative disc disease), lumbar    2. Dysuria    3. Essential hypertension    4. Anxiety    5. Urge incontinence of urine    6. Arthritis    7. Stage 3 chronic kidney disease, unspecified whether stage 3a or 3b CKD ([facility]    8. Stage 3a chronic kidney disease ([facility] [facility]    9. Flu-like symptoms    10. Mixed hyperlipidemia        PLAN:    [facility] for dysuria, discuss medications and gi problem. Diagnoses and all orders for this visit:    DDD (degenerative disc disease), lumbar- one more inj w Dr Jose Jarquin is scheduled  We'll also try prn neurontin. If sx persist or worsen may consider eval at [place] [facility]?  -     gabapentin (NEURONTIN) 100 MG capsule; Take 1 capsule by mouth 2 times daily as needed (BACK PAIN) for up to 30 days. Dysuria- mild UTI on POCT, empiric rx  w cipro  -     POCT Urinalysis no Micro    Essential hypertension- Blood pressure stable and will continue current regimen. Will plan periodic monitoring of renal function, electrolytes, lipid profile. -     hydroCHLOROthiazide (MICROZIDE) 12.5 MG capsule; Take 1 capsule by mouth every morning  -     Comprehensive Metabolic Panel; Future  -     CBC with Auto Differential; Future  -     Lipid Panel; Future    Anxiety - Mood stable on current regimen w/o any significant manifestations of severe depression or anxiety noted at this time. Cont current meds.     - escitalopram (LEXAPRO) 10 MG tablet; Take 1 tablet by mouth every morning    Urge incontinence of urine- The current medical regimen is effective;  continue present plan and medications. -     tolterodine (DETROL) 1 MG tablet; Take 1 tablet by mouth 2 times daily    Arthritis- stopping lodine d/t CKD    Stage 3 chronic kidney disease, unspecified whether stage 3a or 3b CKD ([address]- discussed w pt and we'll also avoid nsaids    Stage 3a chronic kidney disease ([address]    Flu-like symptoms- scr for prior infection  -     Covid-19, Antibody, Total; Future    Mixed hyperlipidemia  - Pt will continue to work on a low fat diet and also exercise, wt loss as appropriate. Will continue periodic monitoring of fasting lipid profile, glucose, liver function. -     Comprehensive Metabolic Panel; Future  -     CBC with Auto Differential; Future  -     Lipid Panel;  Future

## 2022-06-09 LAB
A/G RATIO: 2.2 (ref 1.1–2.2)
ALBUMIN SERPL-MCNC: 4.6 G/DL (ref 3.4–5)
ALP BLD-CCNC: 106 U/L (ref 40–129)
ALT SERPL-CCNC: 26 U/L (ref 10–40)
ANION GAP SERPL CALCULATED.3IONS-SCNC: 16 MMOL/L (ref 3–16)
AST SERPL-CCNC: 23 U/L (ref 15–37)
BASOPHILS ABSOLUTE: 0 K/UL (ref 0–0.2)
BASOPHILS RELATIVE PERCENT: 0.6 %
BILIRUB SERPL-MCNC: 0.5 MG/DL (ref 0–1)
BUN BLDV-MCNC: 33 MG/DL (ref 7–20)
CALCIUM SERPL-MCNC: 9.5 MG/DL (ref 8.3–10.6)
CHLORIDE BLD-SCNC: 106 MMOL/L (ref 99–110)
CHOLESTEROL, TOTAL: 227 MG/DL (ref 0–199)
CO2: 21 MMOL/L (ref 21–32)
CREAT SERPL-MCNC: 1.1 MG/DL (ref 0.6–1.2)
EOSINOPHILS ABSOLUTE: 0 K/UL (ref 0–0.6)
EOSINOPHILS RELATIVE PERCENT: 0.4 %
GFR AFRICAN AMERICAN: 58
GFR NON-AFRICAN AMERICAN: 48
GLUCOSE BLD-MCNC: 92 MG/DL (ref 70–99)
HCT VFR BLD CALC: 36.1 % (ref 36–48)
HDLC SERPL-MCNC: 65 MG/DL (ref 40–60)
HEMOGLOBIN: 12.2 G/DL (ref 12–16)
LDL CHOLESTEROL CALCULATED: 146 MG/DL
LYMPHOCYTES ABSOLUTE: 0.6 K/UL (ref 1–5.1)
LYMPHOCYTES RELATIVE PERCENT: 8.2 %
MCH RBC QN AUTO: 31 PG (ref 26–34)
MCHC RBC AUTO-ENTMCNC: 33.9 G/DL (ref 31–36)
MCV RBC AUTO: 91.6 FL (ref 80–100)
MONOCYTES ABSOLUTE: 0.5 K/UL (ref 0–1.3)
MONOCYTES RELATIVE PERCENT: 7.3 %
NEUTROPHILS ABSOLUTE: 6.1 K/UL (ref 1.7–7.7)
NEUTROPHILS RELATIVE PERCENT: 83.5 %
PDW BLD-RTO: 12.8 % (ref 12.4–15.4)
PLATELET # BLD: 205 K/UL (ref 135–450)
PMV BLD AUTO: 9.7 FL (ref 5–10.5)
POTASSIUM SERPL-SCNC: 4 MMOL/L (ref 3.5–5.1)
RBC # BLD: 3.94 M/UL (ref 4–5.2)
SARS-COV-2 ANTIBODY, TOTAL: NEGATIVE
SODIUM BLD-SCNC: 143 MMOL/L (ref 136–145)
TOTAL PROTEIN: 6.7 G/DL (ref 6.4–8.2)
TRIGL SERPL-MCNC: 79 MG/DL (ref 0–150)
VLDLC SERPL CALC-MCNC: 16 MG/DL
WBC # BLD: 7.3 K/UL (ref 4–11)

## 2022-06-09 NOTE — RESULT ENCOUNTER NOTE
Call pt, labs ok OVERALL AND HAS TESTED NEGATIVE FOR COVID ANTIBODY SO NO DEFINITE PRIOR INFECTION.   HER CHOL IS SL HIGHER FROM [phone] SO DO REC A LITTLE WORK W LOW FAT DIET

## 2022-08-30 ENCOUNTER — OFFICE VISIT (OUTPATIENT)
Dept: INTERNAL MEDICINE CLINIC | Age: 77
End: 2022-08-30
Payer: MEDICARE

## 2022-08-30 ENCOUNTER — HOSPITAL ENCOUNTER (OUTPATIENT)
Dept: CT IMAGING | Age: 77
Discharge: HOME | End: 2022-08-30
Payer: MEDICARE

## 2022-08-30 VITALS
HEART RATE: 84 BPM | OXYGEN SATURATION: 98 % | DIASTOLIC BLOOD PRESSURE: 68 MMHG | SYSTOLIC BLOOD PRESSURE: 122 MMHG | RESPIRATION RATE: 14 BRPM

## 2022-08-30 DIAGNOSIS — G44.311 INTRACTABLE ACUTE POST-TRAUMATIC HEADACHE: Primary | ICD-10-CM

## 2022-08-30 DIAGNOSIS — G44.311 INTRACTABLE ACUTE POST-TRAUMATIC HEADACHE: ICD-10-CM

## 2022-08-30 DIAGNOSIS — M54.2 NECK PAIN: ICD-10-CM

## 2022-08-30 PROCEDURE — G8399 PT W/DXA RESULTS DOCUMENT: HCPCS | Performed by: INTERNAL MEDICINE

## 2022-08-30 PROCEDURE — G8427 DOCREV CUR MEDS BY ELIG CLIN: HCPCS | Performed by: INTERNAL MEDICINE

## 2022-08-30 PROCEDURE — G8417 CALC BMI ABV UP PARAM F/U: HCPCS | Performed by: INTERNAL MEDICINE

## 2022-08-30 PROCEDURE — 1123F ACP DISCUSS/DSCN MKR DOCD: CPT | Performed by: INTERNAL MEDICINE

## 2022-08-30 PROCEDURE — 70450 CT HEAD/BRAIN W/O DYE: CPT

## 2022-08-30 PROCEDURE — 1036F TOBACCO NON-USER: CPT | Performed by: INTERNAL MEDICINE

## 2022-08-30 PROCEDURE — 1090F PRES/ABSN URINE INCON ASSESS: CPT | Performed by: INTERNAL MEDICINE

## 2022-08-30 PROCEDURE — 70490 CT SOFT TISSUE NECK W/O DYE: CPT

## 2022-08-30 PROCEDURE — 99214 OFFICE O/P EST MOD 30 MIN: CPT | Performed by: INTERNAL MEDICINE

## 2022-08-30 NOTE — RESULT ENCOUNTER NOTE
Please call pt, her head and neck CTs are ok w no evidence of skull or neck fractures, no evidence of brain bleeding after her fall

## 2022-08-30 NOTE — PROGRESS NOTES
Gemma Multani  1945 08/30/22    SUBJECTIVE:  a week ago slipped out of bed then hit head w swelling, \"goose egg\" a knot L occiput. Neck is stiff, unsure if had LOC. No nausea, focal wekness or numbness, blurred vision. Denies any confusion. Headache now slowly resolving ~5/10. Neck stiff too w ROM. [facility] for assessment yesterday but left after waiting 2 hrs. Walking is stable, no imbalance. OBJECTIVE:    /68   Pulse 84   Resp 14   LMP  (LMP Unknown)   SpO2 98%     Physical Exam  Constitutional:       Appearance: Normal appearance. HENT:      Head: Normocephalic and atraumatic. Comments: SWOLLEN TENDER AREA SCALP  Eyes:      Extraocular Movements: Extraocular movements intact. Conjunctiva/sclera: Conjunctivae normal.      Pupils: Pupils are equal, round, and reactive to light. Cardiovascular:      Rate and Rhythm: Normal rate and regular rhythm. Heart sounds: Normal heart sounds. No murmur heard. Pulmonary:      Effort: Pulmonary effort is normal. No respiratory distress. Breath sounds: Normal breath sounds. Abdominal:      General: Abdomen is flat. Bowel sounds are normal. There is no distension. Palpations: Abdomen is soft. There is no mass. Tenderness: There is no abdominal tenderness. Hernia: No hernia is present. Musculoskeletal:      Cervical back: Neck supple. Right lower leg: No edema. Left lower leg: No edema. Neurological:      General: No focal deficit present. Mental Status: She is alert and oriented to person, place, and time. Cranial Nerves: No cranial nerve deficit. Sensory: No sensory deficit. Motor: No weakness. Coordination: Coordination normal.      Gait: Gait normal.   Psychiatric:         Mood and Affect: Mood normal.       ASSESSMENT:    1. Intractable acute post-traumatic headache    2.  Neck pain        PLAN:    Nubia Barakat was seen today for ed follow-up and

## 2022-09-19 ENCOUNTER — TELEPHONE (OUTPATIENT)
Dept: INTERNAL MEDICINE CLINIC | Age: 77
End: 2022-09-19

## 2022-09-19 NOTE — TELEPHONE ENCOUNTER
Patient completed @ home Covid test this morning & tested positive. Maya Jarquin tested negative. Only symptoms they are experiencing is a cough. No VV visit is wanted by patient or . Informed patient to quarantine for 5 days. If symptoms worsen patient will contact office.

## 2022-09-20 ENCOUNTER — TELEMEDICINE (OUTPATIENT)
Dept: INTERNAL MEDICINE CLINIC | Age: 77
End: 2022-09-20
Payer: MEDICARE

## 2022-09-20 DIAGNOSIS — U07.1 COVID-19 VIRUS INFECTION: Primary | ICD-10-CM

## 2022-09-20 PROCEDURE — G8399 PT W/DXA RESULTS DOCUMENT: HCPCS | Performed by: INTERNAL MEDICINE

## 2022-09-20 PROCEDURE — 99213 OFFICE O/P EST LOW 20 MIN: CPT | Performed by: INTERNAL MEDICINE

## 2022-09-20 PROCEDURE — G8417 CALC BMI ABV UP PARAM F/U: HCPCS | Performed by: INTERNAL MEDICINE

## 2022-09-20 PROCEDURE — 1123F ACP DISCUSS/DSCN MKR DOCD: CPT | Performed by: INTERNAL MEDICINE

## 2022-09-20 PROCEDURE — 1090F PRES/ABSN URINE INCON ASSESS: CPT | Performed by: INTERNAL MEDICINE

## 2022-09-20 PROCEDURE — 1036F TOBACCO NON-USER: CPT | Performed by: INTERNAL MEDICINE

## 2022-09-20 PROCEDURE — G8427 DOCREV CUR MEDS BY ELIG CLIN: HCPCS | Performed by: INTERNAL MEDICINE

## 2022-09-20 RX ORDER — GUAIFENESIN AND CODEINE PHOSPHATE 100; 10 MG/5ML; MG/5ML
5 SOLUTION ORAL 4 TIMES DAILY PRN
Qty: 120 ML | Refills: 0 | Status: SHIPPED | OUTPATIENT
Start: 2022-09-20 | End: 2022-09-27

## 2022-09-20 RX ORDER — PREDNISONE 1 MG/1
TABLET ORAL
Qty: 21 TABLET | Refills: 0 | Status: SHIPPED | OUTPATIENT
Start: 2022-09-20 | End: 2022-10-28

## 2022-09-20 RX ORDER — NIRMATRELVIR AND RITONAVIR 300-100 MG
KIT ORAL
Qty: 30 TABLET | Refills: 0 | Status: SHIPPED | OUTPATIENT
Start: 2022-09-20 | End: 2022-10-28

## 2022-09-20 NOTE — PROGRESS NOTES
2022    TELEHEALTH EVALUATION -- Audio/Visual (During [facility]- public health emergency)    HPI:    Wendie Thompson (:  1945) has requested an audio/video evaluation for the following concern(s):    3d hx of worsening sinus congestion and drainage, was at class reunion over the weekend. Cough noted, dry, fever and aches. Denies SOB. Review of Systems    Prior to Visit Medications    Medication Sig Taking? Authorizing Provider   hydroCHLOROthiazide (MICROZIDE) 12.5 MG capsule Take 1 capsule by mouth every morning Yes Nidhi Mensah MD   escitalopram (LEXAPRO) 10 MG tablet Take 1 tablet by mouth every morning Yes Nidhi Mensah MD   tolterodine (DETROL) 1 MG tablet Take 1 tablet by mouth 2 times daily Yes Nidhi Mensah MD   Esomeprazole Magnesium (NEXIUM PO) Take by mouth Yes Historical Provider, MD   gabapentin (NEURONTIN) 100 MG capsule Take 1 capsule by mouth 2 times daily as needed (BACK PAIN) for up to 30 days. Nidhi Mensah MD   vitamin B-12 (CYANOCOBALAMIN) 500 MCG tablet Take 1 tablet by mouth daily  Nidhi Mensah MD       Social History     Tobacco Use    Smoking status: Former     Packs/day: 1.00     Years: 7.00     Pack years: 7.00     Types: Cigarettes     Quit date: 1969     Years since quittin.3    Smokeless tobacco: Never   Vaping Use    Vaping Use: Never used   Substance Use Topics    Alcohol use: Yes     Alcohol/week: 3.0 standard drinks     Types: 3 Glasses of wine per week     Comment: Wine 2-3 times a week    Drug use:  No            PHYSICAL EXAMINATION:  [ INSTRUCTIONS:  \"[x]\" Indicates a positive item  \"[]\" Indicates a negative item  -- DELETE ALL ITEMS NOT EXAMINED]  Vital Signs: (As obtained by patient/caregiver or practitioner observation)    Blood pressure-  Heart rate-    Respiratory rate-    Temperature-  Pulse oximetry-     Constitutional: [] Appears well-developed and well-nourished [] No apparent distress      [] Abnormal- Mental status  [] Alert and awake  [] Oriented to person/place/time []Able to follow commands      Eyes:  EOM    []  Normal  [] Abnormal-  Sclera  []  Normal  [] Abnormal -         Discharge []  None visible  [] Abnormal -    HENT:   [] Normocephalic, atraumatic. [] Abnormal   [] Mouth/Throat: Mucous membranes are moist.     External Ears [] Normal  [] Abnormal-     Neck: [] No visualized mass     Pulmonary/Chest: [] Respiratory effort normal.  [] No visualized signs of difficulty breathing or respiratory distress        [] Abnormal-      Musculoskeletal:   [] Normal gait with no signs of ataxia         [] Normal range of motion of neck        [] Abnormal-       Neurological:        [] No Facial Asymmetry (Cranial nerve 7 motor function) (limited exam to video visit)          [] No gaze palsy        [] Abnormal-         Skin:        [] No significant exanthematous lesions or discoloration noted on facial skin         [] Abnormal-            Psychiatric:       [] Normal Affect [] No Hallucinations        [] Abnormal-     Other pertinent observable physical exam findings-     ASSESSMENT/PLAN:  1. COVID-19 virus infection  Consistent w presentation, rec rx as below and fluids, rest.  Pt to call back one week if not improving.      - nirmatrelvir/ritonavir (PAXLOVID, 300/100,) 20 x 150 MG & 10 x 100MG TBPK; Take 3 tablets (two 150 mg nirmatrelvir and one 100 mg ritonavir tablets) by mouth every 12 hours for 5 days. Dispense: 30 tablet; Refill: 0  - predniSONE (DELTASONE) 5 MG tablet; Take 6 tablets by mouth on day 1, 5 on day 2, 4 on day 3, 3 on day 4, 2 on day 5, 1 on day 6. Dispense: 21 tablet; Refill: 0  - guaiFENesin-codeine (TUSSI-ORGANIDIN NR) 100-10 MG/5ML syrup; Take 5 mLs by mouth 4 times daily as needed for Cough or Congestion for up to 7 days. Dispense: 120 mL; Refill: 0      Return if symptoms worsen or fail to improve.     Tiffanie Hardy, was evaluated through a synchronous (real-time) audio-video encounter. The patient (or guardian if applicable) is aware that this is a billable service, which includes applicable co-pays. This Virtual Visit was conducted with patient's (and/or legal guardian's) consent. The visit was conducted pursuant to the emergency declaration under the [facility], [facility] authority and the [employer] and [email or web address] General Act. Patient identification was verified, and a caregiver was present when appropriate. The patient was located at Home: [address] [place] [address]. Provider was located at [place] [id number] (Appt Dept): [address]. Total time spent on this encounter: Not billed by time    --Deangelo Bui MD on 9/20/2022 at 12:48 PM    An electronic signature was used to authenticate this note.

## 2022-09-30 ENCOUNTER — TELEPHONE (OUTPATIENT)
Dept: INTERNAL MEDICINE CLINIC | Age: 77
End: 2022-09-30

## 2022-09-30 DIAGNOSIS — R68.89 FLU-LIKE SYMPTOMS: Primary | ICD-10-CM

## 2022-09-30 RX ORDER — GUAIFENESIN AND CODEINE PHOSPHATE 100; 10 MG/5ML; MG/5ML
5 SOLUTION ORAL 4 TIMES DAILY PRN
Qty: 120 ML | Refills: 0 | Status: SHIPPED | OUTPATIENT
Start: 2022-09-30 | End: 2022-10-07

## 2022-09-30 RX ORDER — AZITHROMYCIN 250 MG/1
250 TABLET, FILM COATED ORAL SEE ADMIN INSTRUCTIONS
Qty: 6 TABLET | Refills: 0 | Status: SHIPPED | OUTPATIENT
Start: 2022-09-30 | End: 2022-10-05

## 2022-09-30 NOTE — TELEPHONE ENCOUNTER
Patient states she had COVID earlier in the month. She has taken all of the paxlovid and prednisone. She still has a lot of congestion, mucus and cough. She is requesting something for her symptoms. Please advise.

## 2022-10-26 ENCOUNTER — NURSE ONLY (OUTPATIENT)
Dept: INTERNAL MEDICINE CLINIC | Age: 77
End: 2022-10-26
Payer: MEDICARE

## 2022-10-26 DIAGNOSIS — Z23 NEED FOR INFLUENZA VACCINATION: Primary | ICD-10-CM

## 2022-10-26 PROCEDURE — 90694 VACC AIIV4 NO PRSRV 0.5ML IM: CPT | Performed by: INTERNAL MEDICINE

## 2022-10-26 PROCEDURE — G0008 ADMIN INFLUENZA VIRUS VAC: HCPCS | Performed by: INTERNAL MEDICINE

## 2022-10-28 ENCOUNTER — OFFICE VISIT (OUTPATIENT)
Dept: INTERNAL MEDICINE CLINIC | Age: 77
End: 2022-10-28
Payer: MEDICARE

## 2022-10-28 VITALS
BODY MASS INDEX: 26.15 KG/M2 | WEIGHT: 133.2 LBS | DIASTOLIC BLOOD PRESSURE: 74 MMHG | OXYGEN SATURATION: 94 % | HEIGHT: 60 IN | TEMPERATURE: 98.6 F | HEART RATE: 93 BPM | SYSTOLIC BLOOD PRESSURE: 112 MMHG

## 2022-10-28 DIAGNOSIS — M81.0 AGE-RELATED OSTEOPOROSIS WITHOUT CURRENT PATHOLOGICAL FRACTURE: Primary | ICD-10-CM

## 2022-10-28 DIAGNOSIS — E78.2 MIXED HYPERLIPIDEMIA: ICD-10-CM

## 2022-10-28 DIAGNOSIS — F51.01 PRIMARY INSOMNIA: ICD-10-CM

## 2022-10-28 DIAGNOSIS — F41.9 ANXIETY: ICD-10-CM

## 2022-10-28 DIAGNOSIS — M81.0 AGE-RELATED OSTEOPOROSIS WITHOUT CURRENT PATHOLOGICAL FRACTURE: ICD-10-CM

## 2022-10-28 DIAGNOSIS — U07.1 COVID-19 VIRUS INFECTION: ICD-10-CM

## 2022-10-28 DIAGNOSIS — Z12.31 VISIT FOR SCREENING MAMMOGRAM: ICD-10-CM

## 2022-10-28 DIAGNOSIS — I10 ESSENTIAL HYPERTENSION: ICD-10-CM

## 2022-10-28 DIAGNOSIS — R05.2 SUBACUTE COUGH: Primary | ICD-10-CM

## 2022-10-28 PROBLEM — G47.00 INSOMNIA: Status: ACTIVE | Noted: 2022-10-28

## 2022-10-28 PROCEDURE — G8399 PT W/DXA RESULTS DOCUMENT: HCPCS | Performed by: INTERNAL MEDICINE

## 2022-10-28 PROCEDURE — 1090F PRES/ABSN URINE INCON ASSESS: CPT | Performed by: INTERNAL MEDICINE

## 2022-10-28 PROCEDURE — 3074F SYST BP LT 130 MM HG: CPT | Performed by: INTERNAL MEDICINE

## 2022-10-28 PROCEDURE — G8427 DOCREV CUR MEDS BY ELIG CLIN: HCPCS | Performed by: INTERNAL MEDICINE

## 2022-10-28 PROCEDURE — 1036F TOBACCO NON-USER: CPT | Performed by: INTERNAL MEDICINE

## 2022-10-28 PROCEDURE — G8484 FLU IMMUNIZE NO ADMIN: HCPCS | Performed by: INTERNAL MEDICINE

## 2022-10-28 PROCEDURE — G8417 CALC BMI ABV UP PARAM F/U: HCPCS | Performed by: INTERNAL MEDICINE

## 2022-10-28 PROCEDURE — 1123F ACP DISCUSS/DSCN MKR DOCD: CPT | Performed by: INTERNAL MEDICINE

## 2022-10-28 PROCEDURE — 99214 OFFICE O/P EST MOD 30 MIN: CPT | Performed by: INTERNAL MEDICINE

## 2022-10-28 PROCEDURE — 3078F DIAST BP <80 MM HG: CPT | Performed by: INTERNAL MEDICINE

## 2022-10-28 RX ORDER — FLUTICASONE PROPIONATE 50 MCG
2 SPRAY, SUSPENSION (ML) NASAL DAILY
Qty: 16 G | Refills: 5 | Status: SHIPPED | OUTPATIENT
Start: 2022-10-28

## 2022-10-28 RX ORDER — ALENDRONATE SODIUM 70 MG/1
70 TABLET ORAL
Qty: 12 TABLET | Refills: 3 | Status: SHIPPED | OUTPATIENT
Start: 2022-10-28

## 2022-10-28 RX ORDER — AZITHROMYCIN 250 MG/1
250 TABLET, FILM COATED ORAL SEE ADMIN INSTRUCTIONS
Qty: 6 TABLET | Refills: 0 | Status: SHIPPED | OUTPATIENT
Start: 2022-10-28 | End: 2022-11-02

## 2022-10-28 RX ORDER — HYDROCHLOROTHIAZIDE 12.5 MG/1
12.5 CAPSULE, GELATIN COATED ORAL EVERY MORNING
Qty: 90 CAPSULE | Refills: 1 | Status: SHIPPED | OUTPATIENT
Start: 2022-10-28

## 2022-10-28 RX ORDER — BENZONATATE 100 MG/1
100 CAPSULE ORAL 3 TIMES DAILY PRN
Qty: 30 CAPSULE | Refills: 0 | Status: SHIPPED | OUTPATIENT
Start: 2022-10-28 | End: 2022-11-07

## 2022-10-28 RX ORDER — TRAZODONE HYDROCHLORIDE 50 MG/1
50 TABLET ORAL NIGHTLY PRN
Qty: 90 TABLET | Refills: 1 | Status: SHIPPED | OUTPATIENT
Start: 2022-10-28

## 2022-10-28 RX ORDER — ESCITALOPRAM OXALATE 10 MG/1
10 TABLET ORAL EVERY MORNING
Qty: 90 TABLET | Refills: 1 | Status: SHIPPED | OUTPATIENT
Start: 2022-10-28

## 2022-10-28 NOTE — PROGRESS NOTES
Yoan Minormarek  1945  10/28/22    SUBJECTIVE:  going to [facility]mer Yol[facility] 11/11 to visit Raven Nicole in [facility]. Mammogram due    Also hx of osteoporosis and due for recheck dexa, we'l retry fosamax too. Prior covid infection ,recovering but sl dry cough persistent. Hypertension: Stable. Denies CP, SOB, visual changes, dizziness, palpitations or HA. She also c/o some insomnia, getting to sleep or going back to sleep  OBJECTIVE:    /74 (Site: Right Upper Arm, Position: Sitting, Cuff Size: Medium Adult)   Pulse 93   Temp 98.6 °F (37 °C) (Oral)   Ht 5' (1.524 m)   Wt 133 lb 3.2 oz (60.4 kg)   LMP  (LMP Unknown)   SpO2 94%   BMI 26.01 kg/m²     Physical Exam  Vitals reviewed. Constitutional:       Appearance: She is well-developed. HENT:      Head: Normocephalic and atraumatic. Eyes:      General: No scleral icterus. Right eye: No discharge. Left eye: No discharge. Conjunctiva/sclera: Conjunctivae normal.      Pupils: Pupils are equal, round, and reactive to light. Neck:      Thyroid: No thyromegaly. Vascular: No JVD. Trachea: No tracheal deviation. Cardiovascular:      Rate and Rhythm: Normal rate and regular rhythm. Heart sounds: Normal heart sounds. No murmur heard. No friction rub. No gallop. Pulmonary:      Effort: Pulmonary effort is normal. No respiratory distress. Breath sounds: Normal breath sounds. No wheezing or rales. Abdominal:      General: Bowel sounds are normal. There is no distension. Palpations: Abdomen is soft. There is no mass. Tenderness: There is no abdominal tenderness. There is no guarding or rebound. Hernia: No hernia is present. Musculoskeletal:      Cervical back: Neck supple. Lymphadenopathy:      Cervical: No cervical adenopathy. Skin:     General: Skin is warm and dry. Neurological:      Mental Status: She is alert.    Psychiatric:         Mood and Affect: Mood normal. ASSESSMENT:    1. Subacute cough    2. COVID-19 virus infection    3. Essential hypertension    4. Mixed hyperlipidemia    5. Primary insomnia    6. Visit for screening mammogram    7. Age-related osteoporosis without current pathological fracture    8. Anxiety        PLAN:    Harsh Redmond was seen today for follow-up. Diagnoses and all orders for this visit:    Subacute cough- residual PNDrip and cough likely from recent COVID infection, add trial flonase and tessalon, if no better next 1-2 weeks add zpak  -     fluticasone (FLONASE) 50 MCG/ACT nasal spray; 2 sprays by Each Nostril route daily  -     benzonatate (TESSALON) 100 MG capsule; Take 1 capsule by mouth 3 times daily as needed for Cough  -     azithromycin (ZITHROMAX) 250 MG tablet; Take 1 tablet by mouth See Admin Instructions for 5 days 500mg on day 1 followed by 250mg on days 2 - 5    COVID-19 virus infection- otherwise good response and no severe illness post covid and s/p paxlovid  -     benzonatate (TESSALON) 100 MG capsule; Take 1 capsule by mouth 3 times daily as needed for Cough    Essential hypertension - Blood pressure stable and will continue current regimen. Will plan periodic monitoring of renal function, electrolytes, lipid profile. -     hydroCHLOROthiazide (MICROZIDE) 12.5 MG capsule; Take 1 capsule by mouth every morning  -     Comprehensive Metabolic Panel; Future  -     CBC with Auto Differential; Future  -     Lipid Panel; Future    Mixed hyperlipidemia - Pt will continue to work on a low fat diet and also exercise, wt loss as appropriate. Will continue periodic monitoring of fasting lipid profile, glucose, liver function. -     Comprehensive Metabolic Panel; Future  -     CBC with Auto Differential; Future  -     Lipid Panel; Future  -     TSH; Future    Primary insomnia - trial rx prn  -     traZODone (DESYREL) 50 MG tablet;  Take 1 tablet by mouth nightly as needed for Sleep    Visit for screening mammogram  -     [facility] DIGITAL SCREEN W CAD BILATERAL PER PROTOCOL; Future    Age-related osteoporosis without current pathological fracture  -     alendronate (FOSAMAX) 70 MG tablet; Take 1 tablet by mouth every 7 days Take with a full glass of water upon arising for the day. Consume on an empty stomach at least 30 minutes before the first food, beverage, or medication. Stay upright (do not lie down) for at least 30 minutes. Do not crush or break. -     Vitamin D 25 Hydroxy; Future    Anxiety - Mood stable on current regimen w/o any significant manifestations of severe depression or anxiety noted at this time. Cont current meds. -     escitalopram (LEXAPRO) 10 MG tablet;  Take 1 tablet by mouth every morning

## 2022-11-29 ENCOUNTER — HOSPITAL ENCOUNTER (OUTPATIENT)
Dept: WOMENS IMAGING | Age: 77
Discharge: HOME | End: 2022-11-29
Payer: MEDICARE

## 2022-11-29 DIAGNOSIS — M81.0 AGE-RELATED OSTEOPOROSIS WITHOUT CURRENT PATHOLOGICAL FRACTURE: ICD-10-CM

## 2022-11-29 DIAGNOSIS — Z12.31 VISIT FOR SCREENING MAMMOGRAM: ICD-10-CM

## 2022-11-29 PROCEDURE — 77063 BREAST TOMOSYNTHESIS BI: CPT

## 2022-11-29 PROCEDURE — 77080 DXA BONE DENSITY AXIAL: CPT

## 2022-12-02 NOTE — RESULT ENCOUNTER NOTE
Please call pt, Lissy's bone density does indicate some progression of her osteoporosis of hip. Is important she continue her fosamax regularly and also be sure she's on a calcium and vit D supplement. Lastly should be trying to walk every day to maintain wt bearing exercise to preserve bone density.   thx

## 2022-12-14 DIAGNOSIS — N39.41 URGE INCONTINENCE OF URINE: ICD-10-CM

## 2022-12-15 RX ORDER — TOLTERODINE TARTRATE 1 MG/1
TABLET, EXTENDED RELEASE ORAL
Qty: 178 TABLET | OUTPATIENT
Start: 2022-12-15

## 2022-12-22 ENCOUNTER — HOSPITAL ENCOUNTER (OUTPATIENT)
Dept: PHYSICAL THERAPY | Age: 77
Setting detail: THERAPIES SERIES
Discharge: HOME | End: 2022-12-22
Payer: MEDICARE

## 2022-12-22 PROCEDURE — 97162 PT EVAL MOD COMPLEX 30 MIN: CPT

## 2022-12-22 PROCEDURE — 97110 THERAPEUTIC EXERCISES: CPT

## 2022-12-22 ASSESSMENT — PAIN DESCRIPTION - FREQUENCY: FREQUENCY: CONTINUOUS

## 2022-12-22 ASSESSMENT — PAIN SCALES - GENERAL: PAINLEVEL_OUTOF10: 3

## 2022-12-22 NOTE — FLOWSHEET NOTE
Outpatient Physical Therapy  [place]           [x] Phone: [phone]   Fax: [phone]  [place]           [] Phone: [phone]   Fax: [phone]        Physical Therapy Daily Treatment Note  Date:  2022    Patient Name:  Xander Webb    :  1945  MRN: [id number]  Restrictions/Precautions: No data recorded      Diagnosis:   Complete rotator cuff tear or rupture of right shoulder, not specified as traumatic [M75.121]  Primary osteoarthritis, right shoulder [M19.011]  Unspecified disorder of synovium and tendon, right shoulder [M67.911] Diagnosis: R shoulder OA, rotator cuff tear, disorder of synovium. Date of Injury/Surgery:   Treatment Diagnosis:  R shoulder pain, impingement. Insurance/Certification information: Medicare  Referring Physician:  MD Dr. Shira Hu   PCP: Becka Grover MD  Next Doctor Visit:    Plan of care signed (Y/N):  n  Outcome Measure: Dash 27  Visit# / total visits:   1/  Pain level: 410   Goals:     Patient goals: decrease pain     Long Term Goals  Time Frame for Long Term Goals : 6 weeks  Long Term Goal 1: I in home program.  Long Term Goal 2: reach overhead with minimal pain. Long Term Goal 3: reach behind back with minimal pain. Long Term Goal 4: sleep without waking due to shoulder pain 3/7 nights. Summary of Evaluation:  Assessment: Pt presents with complaints of R shoulder pain that has worsened in the last month. She does note some good improvement in her symptoms since getting steroid injection. She has pain lifting, reaching overhead, reaching behind her back, sleeping through the night due to pain. Positive tests for impingement, Speed's. She has some weakness and pain limited strength at her shoulder. History of remote R rotator cuff repair. Pt services to improve strength, motion, pain for improved tolerances to alds. Subjective:  See eval         Any changes in Ambulatory Summary Sheet? None        Objective:  See eval   COVID screening questions were asked and patient attested that there had been no contact or symptoms        Exercises: (No more than 4 columns)     Home program progression      Exercise/Equipment Date 12/22/22 #1 Date Date           WARM UP                     TABLE      ER Sidelying w towel 2# 2x8     Scap retraction                           STANDING      scaption 2# 2x10     rows Green 3x10     flexion      Pec stretch      Habd      Counter top push up                 PROPRIOCEPTION                                    MODALITIES                      Other Therapeutic Activities/Education:        Home Exercise Program:    Access Code: [email or web address]  URL: [employer] Guest/  Date: 12/22/2022  Prepared by: Manoj Double    Exercises  Seated Scapular Retraction - 1 x daily - 7 x weekly - 3 sets - 10 reps  Sidelying Shoulder ER with Towel and Dumbbell - 1 x daily - 7 x weekly - 3 sets - 8 reps  Single Arm Scaption with Dumbbell - 1 x daily - 7 x weekly - 2 sets - 8 reps  Standing Row with Anchored Resistance - 1 x daily - 7 x weekly - 3 sets - 8 reps      Manual Treatments:  gentle mobilization inf and post.  Gentle distraction. TPR to rhomboids. Modalities:        Communication with other providers:        Assessment:  (Response towards treatment session) (Pain Rating)  Assessment: Pt presents with complaints of R shoulder pain that has worsened in the last month. She does note some good improvement in her symptoms since getting steroid injection. She has pain lifting, reaching overhead, reaching behind her back, sleeping through the night due to pain. Positive tests for impingement, Speed's. She has some weakness and pain limited strength at her shoulder. History of remote R rotator cuff repair. Pt services to improve strength, motion, pain for improved tolerances to alds.       Plan for Next Session:        Time In / Time Out:    1100/1150       Timed Code/Total Treatment Minutes:  12/50      Next Progress Note due:        Plan of Care Interventions:  [x] Therapeutic Exercise  [] Modalities:  [x] Therapeutic Activity     [] Ultrasound  [] Estim  [] Gait Training      [] Cervical Traction [] Lumbar Traction  [x] Neuromuscular Re-education    [] Cold/hotpack [] Iontophoresis   [x] Instruction in HEP      [] Vasopneumatic   [] Dry Needling    [x] Manual Therapy               [] Aquatic Therapy              Electronically signed by:  Hernan Gruber PT, 12/22/2022, 12:26 PM

## 2022-12-22 NOTE — PLAN OF CARE
Outpatient Physical Therapy                  [x] Phone: [phone]   Fax: [phone]    Pediatric Therapy                                    [] Phone: [phone]   Fax: [phone]  P[facility] [place]                                      [] Phone: [phone]   Fax: [phone]      To: Fidela Ahumada, MD Dr. Lyndee Plaza   From: Gloria Coffman PT, PT     Patient: Rebecca Smith       : 1945  Diagnosis: R shoulder OA, rotator cuff tear, disorder of synovium. Treatment Diagnosis: R shoulder pain, impingement. Date: 2022    Physical Therapy Certification/Re-Certification Form  Dear Dr. Gennaro Florian  The following patient has been evaluated for physical therapy services and for therapy to continue, Please review the attached evaluation and/or summary of the patient's plan of care, and verify that you agree therapy should continue by signing the attached document and sending it back to our office. Patient is a  63 yo female who presents with R shoulder pain which impacts on adls;patient's goal is todecrease pain ;patient reports that  pain  limits activities including lifting, reaching, sleeping; PT to address patient's goals, impairments and activity limitations with skilled interventions checked in plan of care;patient's level of function prior to onset of  pain was independent; did not observe any barriers to learning during PT eval; learning preferences include demonstration, practice, and handouts; patient expressed understanding of HEP; patient appears to be motivated to participate in an active PT program and to be compliant with HEP expectations;patient assisted in developing treatment plan and goals; no DME is currently being used;      Current functional level (based on DASH)   :27    Assessment:  Assessment: Pt presents with complaints of R shoulder pain that has worsened in the last month. She does note some good improvement in her symptoms since getting steroid injection.   She has pain lifting, reaching overhead, reaching behind her back, sleeping through the night due to pain. Positive tests for impingement, Speed's. She has some weakness and pain limited strength at her shoulder. History of remote R rotator cuff repair. Pt services to improve strength, motion, pain for improved tolerances to alds. Plan of Care/Treatment to date:  [x] Therapeutic Exercise    [] Aquatics:  [x] Therapeutic Activity    [x] Ultrasound  [] Elec Stimulation  [] Gait Training     [] Cervical Traction [] Lumbar Traction  [x] Neuromuscular Re-education [] Cold/hotpack [] Iontophoresis   [x] Instruction in HEP       [x] Manual Therapy     [] vasopneumatic            [] Self care home management        []Dry needling trigger point point/pain management          Frequency/Duration:  # Days per week: [x] 1 day # Weeks: [] 1 week [] 5 weeks     [] 2 days   [] 2 weeks [x] 6 weeks     [] 3 days   [] 3 weeks [] 7 weeks     [] 4 days   [] 4 weeks [] 8 weeks    Rehab Potential/Progress: [] Excellent [x] Good [] Fair  [] Poor     Goals:       Long Term Goals  Time Frame for Long Term Goals : 6 weks  Long Term Goal 1: I in home program.  Long Term Goal 2: reach overhead with minimal pain. Long Term Goal 3: reach behind back with minimal pain. Long Term Goal 4: sleep without waking due to shoulder pain 3/7 nights. Electronically signed by:  Torito Rosa PT,  12/22/2022, 12:23 PM              If you have any questions or concerns, please don't hesitate to call.   Thank you for your referral.      Physician Signature:_________________Date:____________Time: ________  By signing above, therapists plan is approved by physician

## 2022-12-22 NOTE — PROGRESS NOTES
Physical Therapy: Initial Evaluation    Patient: Nicholes Lefort (76 y.o. female)   Examination Date:   Plan of Care Certification Period: 2022 to        :  1945 ;    Confirmed: Yes MRN: [id number]  CSN: [id number]   Insurance: Payor: MEDICARE / Plan: MEDICARE PART A AND B / Product Type: *No Product type* /   Insurance ID: [id number] - (Medicare) Secondary Insurance (if applicable): Redd Allison   Referring Physician: MD Dr. Stephanie Phan   PCP: Shereen Orellana MD Visits to Date/Visits Approved:   /      No Show/Cancelled Appts:   /       Medical Diagnosis: Complete rotator cuff tear or rupture of right shoulder, not specified as traumatic [M75.121]  Primary osteoarthritis, right shoulder [M19.011]  Unspecified disorder of synovium and tendon, right shoulder [M67.911] R shoulder OA, rotator cuff tear, disorder of synovium. Treatment Diagnosis: R shoulder pain, impingement. PERTINENT MEDICAL HISTORY           Medical History:     Past Medical History:   Diagnosis Date    Abnormal EKG 2016    Anxiety     B12 deficiency 2021    DDD (degenerative disc disease), lumbar     Diverticulitis     Family history of colon cancer     Family history of coronary artery disease     H/O cardiovascular stress test 2016    cardiolite-normal,EF70%    H/O cardiovascular stress test 2021    Normal Stress, EF 60%. H/O echocardiogram 2021    Normal EF 55-60%.  Mild Mitral and tricuspid regurgitation    Hiatal hernia with GERD     Hyperlipidemia     Hypertension     Insomnia     Osteoporosis     PONV (postoperative nausea and vomiting)     Skin cancer, basal cell     Vertigo      Surgical History:   Past Surgical History:   Procedure Laterality Date    APPENDECTOMY      BACK SURGERY      Dr. Manriquez Share- fusion    CATARACT REMOVAL Bilateral 2017    CHOLECYSTECTOMY, LAPAROSCOPIC  2016    Dr Matty Stone    COLONOSCOPY  2018    liz Tyler AS NEEDED    HYSTERECTOMY (CERVIX STATUS UNKNOWN)      JOINT REPLACEMENT      bilateral knees    SHOULDER ARTHROSCOPY Left 06/03/2014    Dr Geena Borrego  2010    Face Dr. Bernice Flynn (CERVIX REMOVED)  [address] [facility]  2010    left Dr Sandeep El  2001    right Dr. Cici Xie N/A 12/05/2018    EGD DIAGNOSTIC ONLY performed by Tomasa Valenzuela MD at [place] [address]       Medications:   Current Outpatient Medications:     alendronate (FOSAMAX) 70 MG tablet, Take 1 tablet by mouth every 7 days Take with a full glass of water upon arising for the day. Consume on an empty stomach at least 30 minutes before the first food, beverage, or medication. Stay upright (do not lie down) for at least 30 minutes. Do not crush or break., Disp: 12 tablet, Rfl: 3    fluticasone (FLONASE) 50 MCG/ACT nasal spray, 2 sprays by Each Nostril route daily, Disp: 16 g, Rfl: 5    traZODone (DESYREL) 50 MG tablet, Take 1 tablet by mouth nightly as needed for Sleep, Disp: 90 tablet, Rfl: 1    hydroCHLOROthiazide (MICROZIDE) 12.5 MG capsule, Take 1 capsule by mouth every morning, Disp: 90 capsule, Rfl: 1    escitalopram (LEXAPRO) 10 MG tablet, Take 1 tablet by mouth every morning, Disp: 90 tablet, Rfl: 1    tolterodine (DETROL) 1 MG tablet, Take 1 tablet by mouth 2 times daily (Patient not taking: Reported on 10/28/2022), Disp: 180 tablet, Rfl: 1    gabapentin (NEURONTIN) 100 MG capsule, Take 1 capsule by mouth 2 times daily as needed (BACK PAIN) for up to 30 days. , Disp: 60 capsule, Rfl: 1    vitamin B-12 (CYANOCOBALAMIN) 500 MCG tablet, Take 1 tablet by mouth daily, Disp: 30 tablet, Rfl: 3    Esomeprazole Magnesium (NEXIUM PO), Take by mouth, Disp: , Rfl:   Allergies: Celebrex [celecoxib], Flagyl [metronidazole], Lisinopril, Losartan potassium, Norvasc [amlodipine besylate], Nsaids, and Tramadol      SUBJECTIVE EXAMINATION      ,      Family/Caregiver Present: No    Subjective History:    Subjective: aching pain. pain reaching overhead, reaching behind back. Pain to sleep  Additional Pertinent Hx (if applicable): R shoulder pain, hx of repair 8-10yrs ago. Insidious onset of pain about 4 months ago, worse in the last month. Improved with recent steroid injection. Previous treatments prior to current episode?: Injections      Learning/Language: Learning  Does the patient/guardian have any barriers to learning?: No barriers  Will there be a co-learner?: No     Pain Screening   Pain Screening  Patient Currently in Pain: Yes  Pain Assessment: 0-10  Pain Level: 3  Pain Frequency: Continuous    Functional Status    Dominant Hand: : Right    Social History:  Social History  Lives With: Spouse    Occupation/Interests:  Occupation: Retired  Leisure & Hobbies: golf,    Prior Level of Function:    Independent        Current Level of Function:  pain with lifting, reaching, sleeping      ADL Assistance: [address] [place] [address] Avenue: Independent  Homemaking Responsibilities: Yes  Ambulation Assistance: Independent  Transfer Assistance: Independent  Active : Yes    OBJECTIVE EXAMINATION   Restrictions:             Review of Systems:  Vision: Within Functional Limits  Hearing: Within functional limits  Overall Orientation Status: Within Functional Limits    VBI Screening / Lumbar Screening:        Regional Screen:         Observations:  General Observations  Description: FHP    Palpation:   Right Shoulder Palpation: biceps groove, biceps tendon, coracoid, rhomboid  Right Shoulder Palpation: R biceps groove, tendon, coracoid. Left AROM  Right AROM      General AROM UE:  (R 120 scaption, 110 flexion, 105 abd. IR reach to low lunbar w difficulty, ER reach to ~T2)    General AROM UE:  (R 120 scaption, 110 flexion, 105 abd.   IR reach to low lunbar w difficulty, ER reach to ~T2)        Left PROM  Right PROM      General PROM UE:  (R IR 60, ER 60.)    General PROM UE:  (R IR 60, ER 60.)        Left Strength  Right Strength              Strength RUE  R Shoulder Flexion: 4/5  R Shoulder Extension: 4/5  R Shoulder ABduction: 4/5  R Shoulder Internal Rotation: 4+/5  R Shoulder External Rotation: 4+/5  R Shoulder Horizontal ABduction: 4-/5  R Shoulder Horizontal ADduction: 3+/5  R Elbow Flexion: 5/5  R Elbow Extension: 5/5       Special Tests:   Special Tests for Cervical Spine  Special Tests:  (+ impingement, speed's)  Special Tests:  (+ impingement, speeds)         ASSESSMENT     Impression: Assessment: Pt presents with complaints of R shoulder pain that has worsened in the last month. She does note some good improvement in her symptoms since getting steroid injection. She has pain lifting, reaching overhead, reaching behind her back, sleeping through the night due to pain. Positive tests for impingement, Speed's. She has some weakness and pain limited strength at her shoulder. History of remote R rotator cuff repair. Pt services to improve strength, motion, pain for improved tolerances to alds. Statement of Medical Necessity: Physical Therapy is both indicated and medically necessary as outlined in the POC to increase the likelihood of meeting the functionally related goals stated below. Patient's Activity Tolerance:        Patient's rehabilitation potential/prognosis is considered to be: Good         GOALS   Patient Goal(s): decrease pain      Long Term Goals Completed by 6 weks Goal Status   I in home program.     reach overhead with minimal pain. reach behind back with minimal pain. sleep without waking due to shoulder pain 3/7 nights.                                               TREATMENT PLAN            Pt. actively involved in establishing Plan of Care and Goals: Yes  Patient/ Caregiver education and instruction:                    Therapist Signature: Justice Maharaj, PT    Date: 12/22/2022     I certify that the above Therapy Services are being furnished while the patient is under my care. I agree with the treatment plan and certify that this therapy is necessary. Physician's Signature:  ___________________________   Date:_______                                                                   Sy Martines MD        Physician Comments: _______________________________________________    Please sign and return to   [facility]. Please fax to the location listed below.  [facility] for this referral!    [address] [facility] [address], [address] [id number]  Dept: [phone]  Dept Fax: [phone]  Loc: [phone]       POC NOTE

## 2022-12-28 DIAGNOSIS — F41.9 ANXIETY: ICD-10-CM

## 2022-12-28 DIAGNOSIS — I10 ESSENTIAL HYPERTENSION: ICD-10-CM

## 2022-12-28 RX ORDER — HYDROCHLOROTHIAZIDE 12.5 MG/1
12.5 CAPSULE, GELATIN COATED ORAL EVERY MORNING
Qty: 90 CAPSULE | Refills: 1 | Status: SHIPPED | OUTPATIENT
Start: 2022-12-28

## 2022-12-28 RX ORDER — ESCITALOPRAM OXALATE 10 MG/1
10 TABLET ORAL EVERY MORNING
Qty: 90 TABLET | Refills: 1 | Status: SHIPPED | OUTPATIENT
Start: 2022-12-28

## 2022-12-29 ENCOUNTER — HOSPITAL ENCOUNTER (OUTPATIENT)
Dept: PHYSICAL THERAPY | Age: 77
Setting detail: THERAPIES SERIES
Discharge: HOME | End: 2022-12-29
Payer: MEDICARE

## 2022-12-29 PROCEDURE — 97140 MANUAL THERAPY 1/> REGIONS: CPT

## 2022-12-29 PROCEDURE — 97110 THERAPEUTIC EXERCISES: CPT

## 2022-12-29 NOTE — FLOWSHEET NOTE
Outpatient Physical Therapy  [place]           [x] Phone: [phone]   Fax: [phone]  [place]           [] Phone: [phone]   Fax: [phone]        Physical Therapy Daily Treatment Note  Date:  2022     Patient Name:  Edilberto Hayden                 :  1945                   MRN: [id number]  Restrictions/Precautions: No data recorded   Diagnosis:   Complete rotator cuff tear or rupture of right shoulder, not specified as traumatic [M75.121]  Primary osteoarthritis, right shoulder [M19.011]  Unspecified disorder of synovium and tendon, right shoulder [M67.911] Diagnosis: R shoulder OA, rotator cuff tear, disorder of synovium. Date of Injury/Surgery:   Treatment Diagnosis:  R shoulder pain, impingement. Insurance/Certification information: Medicare  Referring Physician:  MD Dr. Danitza Grissom   PCP: Maira Echols MD  Next Doctor Visit:    Plan of care signed (Y/N):  n  Outcome Measure: Dash 27  Visit# / total visits:   2/  Pain level:      4/10       Goals:     Patient goals: decrease pain  Long Term Goals  Time Frame for Long Term Goals : 6 weeks  Long Term Goal 1: I in home program.  Long Term Goal 2: reach overhead with minimal pain. Long Term Goal 3: reach behind back with minimal pain. Long Term Goal 4: sleep without waking due to shoulder pain 3/7 nights. Summary of Evaluation:  Assessment: Pt presents with complaints of R shoulder pain that has worsened in the last month. She does note some good improvement in her symptoms since getting steroid injection. She has pain lifting, reaching overhead, reaching behind her back, sleeping through the night due to pain. Positive tests for impingement, Speed's. She has some weakness and pain limited strength at her shoulder. History of remote R rotator cuff repair. Pt services to improve strength, motion, pain for improved tolerances to alds.           Subjective:  Pt states her shld has been feeling some better since getting injection ~ 3 weeks ago. Did well after previous session. Still having problems with sciatica. Any changes in Ambulatory Summary Sheet? None        Objective:  See eval   COVID screening questions were asked and patient attested that there had been no contact or symptoms        Exercises: (No more than 4 columns)     P/ROM flex and abd WNL      Exercise/Equipment Date 12/22/22 #1 Date 12/29/22 #2 Date           WARM UP         UBE  1.5' F/B          TABLE      ER Sidelying w towel 2# 2x8 Sidelying w towel 2# 2x10    Scap retraction  3\" 1x10                         STANDING      scaption 2# 2x10 2# 2x10    rows Green 3x10 Green 3x10    flexion  2# 2x10    Pec stretch      Habd  YTB 2x10    Counter top push up  2x10               PROPRIOCEPTION                                    MODALITIES                      Other Therapeutic Activities/Education:        Home Exercise Program:    Access Code: [email or web address]  URL: [email or web address]  Date: 12/22/2022  Prepared by: Adalgisa Gimenez    Exercises  Seated Scapular Retraction - 1 x daily - 7 x weekly - 3 sets - 10 reps  Sidelying Shoulder ER with Towel and Dumbbell - 1 x daily - 7 x weekly - 3 sets - 8 reps  Single Arm Scaption with Dumbbell - 1 x daily - 7 x weekly - 2 sets - 8 reps  Standing Row with Anchored Resistance - 1 x daily - 7 x weekly - 3 sets - 8 reps      Manual Treatments:  gentle mobilization inf and post.  Gentle distraction. TPR to rhomboids and UT. Modalities:        Communication with other providers:        Assessment:  (Response towards treatment session) (Pain Rating)   Pt demonstrated good tolerance to today's session without adverse reaction. Did notice less pain following therapy rating 1/10. Continue with therapy progressing as tolerated.       Plan for Next Session:        Time In / Time Out:    0902/1022       Timed Code/Total Treatment Minutes:  35/35, (1) TE, (1) MT      Next Progress Note due:        Plan of Care Interventions:  [x] Therapeutic Exercise  [] Modalities:  [x] Therapeutic Activity     [] Ultrasound  [] Estim  [] Gait Training      [] Cervical Traction [] Lumbar Traction  [x] Neuromuscular Re-education    [] Cold/hotpack [] Iontophoresis   [x] Instruction in HEP      [] Vasopneumatic   [] Dry Needling    [x] Manual Therapy               [] Aquatic Therapy              Electronically signed by:  Nancy Olivera 12/29/2022, 9:47 AM

## 2023-01-04 ENCOUNTER — HOSPITAL ENCOUNTER (OUTPATIENT)
Dept: PHYSICAL THERAPY | Age: 78
Setting detail: THERAPIES SERIES
Discharge: HOME | End: 2023-01-04
Payer: MEDICARE

## 2023-01-04 PROCEDURE — 97110 THERAPEUTIC EXERCISES: CPT

## 2023-01-04 PROCEDURE — 97140 MANUAL THERAPY 1/> REGIONS: CPT

## 2023-01-04 NOTE — FLOWSHEET NOTE
Outpatient Physical Therapy  [place]           [x] Phone: [phone]   Fax: [phone]  [place]           [] Phone: [phone]   Fax: [phone]        Physical Therapy Daily Treatment Note  Date: 23     Patient Name:  Saskia Shen                 :  1945                   MRN: [id number]  Restrictions/Precautions: No data recorded   Diagnosis:   Complete rotator cuff tear or rupture of right shoulder, not specified as traumatic [M75.121]  Primary osteoarthritis, right shoulder [M19.011]  Unspecified disorder of synovium and tendon, right shoulder [M67.911] Diagnosis: R shoulder OA, rotator cuff tear, disorder of synovium.  Date of Injury/Surgery:   Treatment Diagnosis:  R shoulder pain, impingement.  Insurance/Certification information: Medicare  Referring Physician:  Uriel Brmabila MD Dr. Milleti   PCP: Watson Dickey MD  Next Doctor Visit:  only if needed with Dr Govea  Plan of care signed (Y/N):  n  Outcome Measure: Dash 27  Visit# / total visits:   3/  Pain level:      2/10       Goals:     Patient goals: decrease pain  Long Term Goals  Time Frame for Long Term Goals : 6 weeks  Long Term Goal 1: I in home program.  Long Term Goal 2: reach overhead with minimal pain.  Long Term Goal 3: reach behind back with minimal pain.  Long Term Goal 4: sleep without waking due to shoulder pain 3/7 nights.        Summary of Evaluation:  Assessment: Pt presents with complaints of R shoulder pain that has worsened in the last month.  She does note some good improvement in her symptoms since getting steroid injection.  She has pain lifting, reaching overhead, reaching behind her back, sleeping through the night due to pain. Positive tests for impingement, Speed's.  She has some weakness and pain limited strength at her shoulder.  History of remote R rotator cuff repair.  Pt services to improve strength, motion, pain for improved tolerances to alds.          Subjective:  Pt states her  shld is still doing some better. Has not had any sciatic pain last couple days. Any changes in Ambulatory Summary Sheet? None        Objective:  See eval   COVID screening questions were asked and patient attested that there had been no contact or symptoms        Exercises: (No more than 4 columns)     P/ROM flex and abd WNL  Still having some discomfort sleeping on R shld    Exercise/Equipment Date 12/22/22 #1 Date 12/29/22 #2 Date   1/4/23 #3           WARM UP         UBE  1.5' F/B 2' F/B @120         TABLE      ER Sidelying w towel 2# 2x8 Sidelying w towel 2# 2x10 Sidelying w towel 2# 2x10   Scap retraction  3\" 1x10 HEP                        STANDING      scaption 2# 2x10 2# 2x10 2# 2x10   rows Green 3x10 Green 3x10 GTB 3x10   Low row elbows straight   RTB 2x10   flexion  2# 2x10 2# 2x10   Pec stretch   30\" x 2   Habd  YTB 2x10 YTB 2x10   Counter top push up  2x10 2x10   Core wheel flex      20x   IR towel stretch   10ct x 5 gentle   PROPRIOCEPTION                                    MODALITIES                      Other Therapeutic Activities/Education:        Home Exercise Program:    Access Code: [email or web address]  URL: [email or web address]  Date: 12/22/2022  Prepared by: Anil Lopez    Exercises  Seated Scapular Retraction - 1 x daily - 7 x weekly - 3 sets - 10 reps  Sidelying Shoulder ER with Towel and Dumbbell - 1 x daily - 7 x weekly - 3 sets - 8 reps  Single Arm Scaption with Dumbbell - 1 x daily - 7 x weekly - 2 sets - 8 reps  Standing Row with Anchored Resistance - 1 x daily - 7 x weekly - 3 sets - 8 reps  Added gentle IR stretch. Provided with handout. Manual Treatments:  gentle mobilization inf and post.  Gentle distraction. TPR to rhomboids and UT.        Modalities:        Communication with other providers:        Assessment:  (Response towards treatment session) (Pain Rating)   Pt demonstrated overall good tolerance to today's session without adverse reaction but did notice some increase discomfort following IR towel stretch. Continue with therapy progressing as tolerated.   End pain 3/10 following IR stretch    Plan for Next Session:        Time In / Time Out:    4116/4891       Timed Code/Total Treatment Minutes:  84/70, (2) TE, (1) MT      Next Progress Note due:        Plan of Care Interventions:  [x] Therapeutic Exercise  [] Modalities:  [x] Therapeutic Activity     [] Ultrasound  [] Estim  [] Gait Training      [] Cervical Traction [] Lumbar Traction  [x] Neuromuscular Re-education    [] Cold/hotpack [] Iontophoresis   [x] Instruction in HEP      [] Vasopneumatic   [] Dry Needling    [x] Manual Therapy               [] Aquatic Therapy              Electronically signed by:  Echo Leach 1/4/2023, 8:10 AM

## 2023-01-11 ENCOUNTER — HOSPITAL ENCOUNTER (OUTPATIENT)
Dept: PHYSICAL THERAPY | Age: 78
Discharge: HOME | End: 2023-01-11

## 2023-01-11 NOTE — FLOWSHEET NOTE
Physical Therapy  Cancellation/No-show Note  Patient Name:  Maryuri Bales  :  1945   Date:  2023  Cancelled visits to date: 1  No-shows to date: 0    For today's appointment patient:  []  Cancelled  []  Rescheduled appointment  []  No-show     Reason given by patient:  []  Patient ill  []  Conflicting appointment  []  No transportation    []  Conflict with work  [x]  No reason given  []  Other:     Comments:      Electronically signed by:  Khushi Yang PT, 2023, 10:16 AM

## 2023-01-18 ENCOUNTER — HOSPITAL ENCOUNTER (OUTPATIENT)
Dept: PHYSICAL THERAPY | Age: 78
Setting detail: THERAPIES SERIES
Discharge: HOME | End: 2023-01-18
Payer: MEDICARE

## 2023-01-18 PROCEDURE — 97140 MANUAL THERAPY 1/> REGIONS: CPT

## 2023-01-18 PROCEDURE — 97110 THERAPEUTIC EXERCISES: CPT

## 2023-01-18 NOTE — FLOWSHEET NOTE
Outpatient Physical Therapy  [place]           [x] Phone: [phone]   Fax: [phone]  [place]           [] Phone: [phone]   Fax: [phone]        Physical Therapy Daily Treatment Note  Date: 23     Patient Name:  Esau Prakash                 :  1945                   MRN: [id number]  Restrictions/Precautions: No data recorded   Diagnosis:   Complete rotator cuff tear or rupture of right shoulder, not specified as traumatic [M75.121]  Primary osteoarthritis, right shoulder [M19.011]  Unspecified disorder of synovium and tendon, right shoulder [M67.911] Diagnosis: R shoulder OA, rotator cuff tear, disorder of synovium. Date of Injury/Surgery:   Treatment Diagnosis:  R shoulder pain, impingement. Insurance/Certification information: Medicare  Referring Physician:  MD Dr. Grayson Wright   PCP: Savana Crowder MD  Next Doctor Visit:  only if needed with Dr Marcus Underwood of care signed (Y/N):  n  Outcome Measure: Dash 27  Visit# / total visits:   4/  Pain level:      0/10 currently, 0-5/10       Goals:     Patient goals: decrease pain  Long Term Goals  Time Frame for Long Term Goals : 6 weeks  Long Term Goal 1: I in home program.  Long Term Goal 2: reach overhead with minimal pain. Long Term Goal 3: reach behind back with minimal pain. Long Term Goal 4: sleep without waking due to shoulder pain 3/7 nights. Summary of Evaluation:  Assessment: Pt presents with complaints of R shoulder pain that has worsened in the last month. She does note some good improvement in her symptoms since getting steroid injection. She has pain lifting, reaching overhead, reaching behind her back, sleeping through the night due to pain. Positive tests for impingement, Speed's. She has some weakness and pain limited strength at her shoulder. History of remote R rotator cuff repair. Pt services to improve strength, motion, pain for improved tolerances to alds. Subjective:  Pt states unable to come last week due to sciatic pain and then got the flu. Currently reports of no pain but pain was 5/10 last night. Overall feels her shoulder is doing some better. Any changes in Ambulatory Summary Sheet? None        Objective:  See eval   COVID screening questions were asked and patient attested that there had been no contact or symptoms        Exercises: (No more than 4 columns)     P/ROM WNL  Now able to reach up and behind her head now with less difficulty    Exercise/Equipment Date 12/22/22 #1 Date 12/29/22 #2 Date   1/4/23 #3 1/18/23 #4            WARM UP          UBE  1.5' F/B 2' F/B @120 2' F/B @120          TABLE       ER Sidelying w towel 2# 2x8 Sidelying w towel 2# 2x10 Sidelying w towel 2# 2x10 Sidelying w towel 2# 2x10   Scap retraction  3\" 1x10 HEP                            STANDING       scaption 2# 2x10 2# 2x10 2# 2x10 2# 2x10   rows Green 3x10 Green 3x10 GTB 3x10 GTB 3x10   Low row elbows straight   RTB 2x10 RTB 2x10   flexion  2# 2x10 2# 2x10 2# 2x10   Pec stretch   30\" x 2 HEP   Habd  YTB 2x10 YTB 2x10 YTB 2x10   Counter top push up  2x10 2x10 2x10   Core wheel flex      20x 20x   IR towel stretch   10ct x 5 gentle 10ct x 5   Ball around back    2# ball 2x10 cw/ccw   alphabet    1# 1x   PROPRIOCEPTION                                          MODALITIES                         Other Therapeutic Activities/Education:        Home Exercise Program:    Access Code: [email or web address]  URL: [email or web address]  Date: 12/22/2022  Prepared by: Arnaldo Garcia    Exercises  Seated Scapular Retraction - 1 x daily - 7 x weekly - 3 sets - 10 reps  Sidelying Shoulder ER with Towel and Dumbbell - 1 x daily - 7 x weekly - 3 sets - 8 reps  Single Arm Scaption with Dumbbell - 1 x daily - 7 x weekly - 2 sets - 8 reps  Standing Row with Anchored Resistance - 1 x daily - 7 x weekly - 3 sets - 8 reps  Added gentle IR stretch. Provided with handout.     Manual Treatments: gentle mobilization inf and post.  Gentle distraction. Modalities:        Communication with other providers:        Assessment:  (Response towards treatment session) (Pain Rating)   Pt demonstrated overall good tolerance to today's session without adverse reaction. Pt demonstrating improved ROM and strength. Continue with therapy progressing as tolerated.   End pain 0/10     Plan for Next Session:        Time In / Time Out:    2073/7306       Timed Code/Total Treatment Minutes:  40/40, (2) TE, (1) MT      Next Progress Note due:        Plan of Care Interventions:  [x] Therapeutic Exercise  [] Modalities:  [x] Therapeutic Activity     [] Ultrasound  [] Estim  [] Gait Training      [] Cervical Traction [] Lumbar Traction  [x] Neuromuscular Re-education    [] Cold/hotpack [] Iontophoresis   [x] Instruction in HEP      [] Vasopneumatic   [] Dry Needling    [x] Manual Therapy               [] Aquatic Therapy              Electronically signed by:  Nehal Manriquez 1/18/2023, 1:47 PM

## 2023-01-24 ENCOUNTER — HOSPITAL ENCOUNTER (OUTPATIENT)
Dept: PHYSICAL THERAPY | Age: 78
Setting detail: THERAPIES SERIES
Discharge: HOME | End: 2023-01-24
Payer: MEDICARE

## 2023-01-24 PROCEDURE — 97140 MANUAL THERAPY 1/> REGIONS: CPT

## 2023-01-24 PROCEDURE — 97110 THERAPEUTIC EXERCISES: CPT

## 2023-01-24 NOTE — FLOWSHEET NOTE
Outpatient Physical Therapy  [place]           [x] Phone: [phone]   Fax: [phone]  [place]           [] Phone: [phone]   Fax: [phone]        Physical Therapy Daily Treatment Note  Date: 23     Patient Name:  Hubert Rooney                 :  1945                   MRN: [id number]  Restrictions/Precautions: No data recorded   Diagnosis:   Complete rotator cuff tear or rupture of right shoulder, not specified as traumatic [M75.121]  Primary osteoarthritis, right shoulder [M19.011]  Unspecified disorder of synovium and tendon, right shoulder [M67.911] Diagnosis: R shoulder OA, rotator cuff tear, disorder of synovium. Date of Injury/Surgery:   Treatment Diagnosis:  R shoulder pain, impingement. Insurance/Certification information: Medicare  Referring Physician:  MD Dr. Misael Arredondo   PCP: Berna Tyson MD  Next Doctor Visit:  only if needed with Dr Guerrero Mask of care signed (Y/N):  n  Outcome Measure: Dash 27  Visit# / total visits:   5/  Pain level:      0/10 currently, 0-5/10       Goals:     Patient goals: decrease pain  Long Term Goals  Time Frame for Long Term Goals : 6 weeks  Long Term Goal 1: I in home program.  Long Term Goal 2: reach overhead with minimal pain. Long Term Goal 3: reach behind back with minimal pain. Long Term Goal 4: sleep without waking due to shoulder pain 3/7 nights. Summary of Evaluation:  Assessment: Pt presents with complaints of R shoulder pain that has worsened in the last month. She does note some good improvement in her symptoms since getting steroid injection. She has pain lifting, reaching overhead, reaching behind her back, sleeping through the night due to pain. Positive tests for impingement, Speed's. She has some weakness and pain limited strength at her shoulder. History of remote R rotator cuff repair. Pt services to improve strength, motion, pain for improved tolerances to alds. Subjective:  Pt continuing to note improvement. Reports of no pain last 3 to 4 days. Any changes in Ambulatory Summary Sheet? None        Objective:  See eval   COVID screening questions were asked and patient attested that there had been no contact or symptoms        Exercises: (No more than 4 columns)     P/ROM WNL  No pain washing and dryer her hair  No pain last 3 to 4 days  Has little trouble washing her back  Slept through the night last 2 nights without any pain    Exercise/Equipment Date 12/22/22 #1 Date 12/29/22 #2 Date   1/4/23 #3 1/18/23 #4 1/24/23 #5             WARM UP           UBE  1.5' F/B 2' F/B @120 2' F/B @120 2' F/B @120           TABLE        ER Sidelying w towel 2# 2x8 Sidelying w towel 2# 2x10 Sidelying w towel 2# 2x10 Sidelying w towel 2# 2x10 Sidelying w towel 2# 2x10   Scap retraction  3\" 1x10 HEP                                STANDING        scaption 2# 2x10 2# 2x10 2# 2x10 2# 2x10 2# 2x10   rows Green 3x10 Green 3x10 GTB 3x10 GTB 3x10 Blue TB 3x10   Low row elbows straight   RTB 2x10 RTB 2x10 RTB 2x10   flexion  2# 2x10 2# 2x10 2# 2x10 2# 2x10   Pec stretch   30\" x 2 HEP    Habd  YTB 2x10 YTB 2x10 YTB 2x10 RTB   Counter top push up  2x10 2x10 2x10 2x10   Core wheel flex      20x 20x 20x   IR towel stretch   10ct x 5 gentle 10ct x 5    Ball around back    2# ball 2x10 cw/ccw 2# ball 2x10 cw/ccw   alphabet    1# 1x 1# 1x   Overhead taps     1# 2x10   PROPRIOCEPTION                                                MODALITIES                            Other Therapeutic Activities/Education:        Home Exercise Program:    Access Code: [email or web address]  URL: [email or web address]  Date: 12/22/2022  Prepared by: Mikhail Patient    Exercises  Seated Scapular Retraction - 1 x daily - 7 x weekly - 3 sets - 10 reps  Sidelying Shoulder ER with Towel and Dumbbell - 1 x daily - 7 x weekly - 3 sets - 8 reps  Single Arm Scaption with Dumbbell - 1 x daily - 7 x weekly - 2 sets - 8 reps  Standing Row with Anchored Resistance - 1 x daily - 7 x weekly - 3 sets - 8 reps  Added gentle IR stretch. Provided with handout. Manual Treatments:  gentle mobilization inf and post.  Gentle distraction. Modalities:        Communication with other providers:        Assessment:  (Response towards treatment session) (Pain Rating)   Pt demonstrated overall good tolerance to today's session without adverse reaction. Pt continuing to demonstrate improved ROM and strength. Continue with therapy progressing as tolerated.   End pain 0/10     Plan for Next Session:        Time In / Time Out:    4098/7570       Timed Code/Total Treatment Minutes:  40/40, (2) TE, (1) MT      Next Progress Note due:        Plan of Care Interventions:  [x] Therapeutic Exercise  [] Modalities:  [x] Therapeutic Activity     [] Ultrasound  [] Estim  [] Gait Training      [] Cervical Traction [] Lumbar Traction  [x] Neuromuscular Re-education    [] Cold/hotpack [] Iontophoresis   [x] Instruction in HEP      [] Vasopneumatic   [] Dry Needling    [x] Manual Therapy               [] Aquatic Therapy              Electronically signed by:  Ima Councilman 1/24/2023, 1:37 PM

## 2023-01-25 NOTE — PLAN OF CARE
Patients Plan of Care was received and signed. Signed POC was scanned and placed in the patients chart.     Jung White

## 2023-02-16 ENCOUNTER — HOSPITAL ENCOUNTER (OUTPATIENT)
Dept: PHYSICAL THERAPY | Age: 78
Discharge: HOME | End: 2023-02-16

## 2023-02-16 NOTE — FLOWSHEET NOTE
Physical Therapy  Cancellation/No-show Note  Patient Name:  Tamica Powers  :  1945   Date:  2023  Cancelled visits to date: 1  No-shows to date: 1    For today's appointment patient:  []  Cancelled  []  Rescheduled appointment  [x]  No-show     Reason given by patient:  []  Patient ill  []  Conflicting appointment  []  No transportation    []  Conflict with work  []  No reason given  []  Other:     Comments:      Electronically signed by:  Debi Gavin PT, 2023, 1:09 PM

## 2023-02-28 ENCOUNTER — TELEMEDICINE (OUTPATIENT)
Dept: INTERNAL MEDICINE CLINIC | Age: 78
End: 2023-02-28
Payer: MEDICARE

## 2023-02-28 DIAGNOSIS — J01.00 ACUTE NON-RECURRENT MAXILLARY SINUSITIS: Primary | ICD-10-CM

## 2023-02-28 PROCEDURE — 1123F ACP DISCUSS/DSCN MKR DOCD: CPT | Performed by: INTERNAL MEDICINE

## 2023-02-28 PROCEDURE — 1036F TOBACCO NON-USER: CPT | Performed by: INTERNAL MEDICINE

## 2023-02-28 PROCEDURE — 99213 OFFICE O/P EST LOW 20 MIN: CPT | Performed by: INTERNAL MEDICINE

## 2023-02-28 PROCEDURE — G8399 PT W/DXA RESULTS DOCUMENT: HCPCS | Performed by: INTERNAL MEDICINE

## 2023-02-28 PROCEDURE — G8427 DOCREV CUR MEDS BY ELIG CLIN: HCPCS | Performed by: INTERNAL MEDICINE

## 2023-02-28 PROCEDURE — 1090F PRES/ABSN URINE INCON ASSESS: CPT | Performed by: INTERNAL MEDICINE

## 2023-02-28 PROCEDURE — G8484 FLU IMMUNIZE NO ADMIN: HCPCS | Performed by: INTERNAL MEDICINE

## 2023-02-28 PROCEDURE — G8417 CALC BMI ABV UP PARAM F/U: HCPCS | Performed by: INTERNAL MEDICINE

## 2023-02-28 RX ORDER — PREDNISONE 1 MG/1
TABLET ORAL
Qty: 21 TABLET | Refills: 0 | Status: SHIPPED | OUTPATIENT
Start: 2023-02-28 | End: 2023-03-01 | Stop reason: SDUPTHER

## 2023-02-28 RX ORDER — AZITHROMYCIN 250 MG/1
250 TABLET, FILM COATED ORAL SEE ADMIN INSTRUCTIONS
Qty: 6 TABLET | Refills: 0 | Status: SHIPPED | OUTPATIENT
Start: 2023-02-28 | End: 2023-03-01 | Stop reason: SDUPTHER

## 2023-02-28 ASSESSMENT — PATIENT HEALTH QUESTIONNAIRE - PHQ9
SUM OF ALL RESPONSES TO PHQ9 QUESTIONS 1 & 2: 0
1. LITTLE INTEREST OR PLEASURE IN DOING THINGS: 0
2. FEELING DOWN, DEPRESSED OR HOPELESS: 0
SUM OF ALL RESPONSES TO PHQ QUESTIONS 1-9: 0
SUM OF ALL RESPONSES TO PHQ QUESTIONS 1-9: 0
DEPRESSION UNABLE TO ASSESS: FUNCTIONAL CAPACITY MOTIVATION LIMITS ACCURACY
SUM OF ALL RESPONSES TO PHQ QUESTIONS 1-9: 0
SUM OF ALL RESPONSES TO PHQ QUESTIONS 1-9: 0

## 2023-02-28 NOTE — PROGRESS NOTES
2023    TELEHEALTH EVALUATION -- Audio/Visual (During CTBUU-18 public health emergency)    HPI:    Zeeshan Little (:  1945) has requested an audio/video evaluation for the following concern(s):    The past 2-3d w onset of sinus congestion and drainage, no st or cough. Drainage is yellow/green. No fever or chills. Home covid test neg. Review of Systems    Prior to Visit Medications    Medication Sig Taking? Authorizing Provider   escitalopram (LEXAPRO) 10 MG tablet Take 1 tablet by mouth every morning Yes Emilie Dean MD   hydroCHLOROthiazide (MICROZIDE) 12.5 MG capsule Take 1 capsule by mouth every morning Yes Emilie Dean MD   alendronate (FOSAMAX) 70 MG tablet Take 1 tablet by mouth every 7 days Take with a full glass of water upon arising for the day. Consume on an empty stomach at least 30 minutes before the first food, beverage, or medication. Stay upright (do not lie down) for at least 30 minutes. Do not crush or break. Yes Emilie Dean MD   fluticasone (FLONASE) 50 MCG/ACT nasal spray 2 sprays by Each Nostril route daily Yes Emilie Dean MD   traZODone (DESYREL) 50 MG tablet Take 1 tablet by mouth nightly as needed for Sleep Yes Emilie Dean MD   tolterodine (DETROL) 1 MG tablet Take 1 tablet by mouth 2 times daily Yes Emilei Dean MD   Esomeprazole Magnesium (NEXIUM PO) Take by mouth Yes Historical Provider, MD   gabapentin (NEURONTIN) 100 MG capsule Take 1 capsule by mouth 2 times daily as needed (BACK PAIN) for up to 30 days.   Emilie Dean MD   vitamin B-12 (CYANOCOBALAMIN) 500 MCG tablet Take 1 tablet by mouth daily  Emilie Dean MD       Social History     Tobacco Use    Smoking status: Former     Packs/day: 1.00     Years: 7.00     Pack years: 7.00     Types: Cigarettes     Quit date: 1969     Years since quittin.7    Smokeless tobacco: Never   Vaping Use    Vaping Use: Never used   Substance Use Topics    Alcohol use: Yes     Alcohol/week: 3.0 standard drinks     Types: 3 Glasses of wine per week     Comment: Wine 2-3 times a week    Drug use: No            PHYSICAL EXAMINATION:  [ INSTRUCTIONS:  \"[x]\" Indicates a positive item  \"[]\" Indicates a negative item  -- DELETE ALL ITEMS NOT EXAMINED]  Vital Signs: (As obtained by patient/caregiver or practitioner observation)    Blood pressure-  Heart rate-    Respiratory rate-    Temperature-  Pulse oximetry-     Constitutional: [x] Appears well-developed and well-nourished [] No apparent distress      [] Abnormal-   Mental status  [] Alert and awake  [] Oriented to person/place/time []Able to follow commands      Eyes:  EOM    []  Normal  [] Abnormal-  Sclera  []  Normal  [] Abnormal -         Discharge []  None visible  [] Abnormal -    HENT:   [] Normocephalic, atraumatic. [] Abnormal   [] Mouth/Throat: Mucous membranes are moist.     External Ears [] Normal  [] Abnormal-     Neck: [] No visualized mass     Pulmonary/Chest: [x] Respiratory effort normal.  [] No visualized signs of difficulty breathing or respiratory distress        [] Abnormal-      Musculoskeletal:   [] Normal gait with no signs of ataxia         [] Normal range of motion of neck        [] Abnormal-       Neurological:        [] No Facial Asymmetry (Cranial nerve 7 motor function) (limited exam to video visit)          [] No gaze palsy        [] Abnormal-         Skin:        [] No significant exanthematous lesions or discoloration noted on facial skin         [] Abnormal-            Psychiatric:       [] Normal Affect [] No Hallucinations        [] Abnormal-     Other pertinent observable physical exam findings-     ASSESSMENT/PLAN:  1. Acute non-recurrent maxillary sinusitis  Consistent w presentation, rec rx as below and fluids, rest.  Pt to call back one week if not improving.      - azithromycin (ZITHROMAX) 250 MG tablet;  Take 1 tablet by mouth See Admin Instructions for 5 days 500mg on day 1 followed by 250mg on days 2 - 5  Dispense: 6 tablet; Refill: 0  - predniSONE (DELTASONE) 5 MG tablet; Take 6 tablets by mouth on day 1, 5 on day 2, 4 on day 3, 3 on day 4, 2 on day 5, 1 on day 6. Dispense: 21 tablet; Refill: 0    Return if symptoms worsen or fail to improve. Florinda Cole, was evaluated through a synchronous (real-time) audio-video encounter. The patient (or guardian if applicable) is aware that this is a billable service, which includes applicable co-pays. This Virtual Visit was conducted with patient's (and/or legal guardian's) consent. The visit was conducted pursuant to the emergency declaration under the [address] [place] [address], [address] [place] [address] authority and the [employer] General Act. Patient identification was verified, and a caregiver was present when appropriate. The patient was located at Home: [address] [place] [address]  [address] [place] [address]  Provider was located at [place] [address]): [address]        Total time spent on this encounter: Not billed by time    --Karla García MD on 2/28/2023 at 11:34 AM    An electronic signature was used to authenticate this note.

## 2023-03-01 DIAGNOSIS — J01.00 ACUTE NON-RECURRENT MAXILLARY SINUSITIS: ICD-10-CM

## 2023-03-01 RX ORDER — PREDNISONE 1 MG/1
TABLET ORAL
Qty: 21 TABLET | Refills: 0 | Status: SHIPPED | OUTPATIENT
Start: 2023-03-01

## 2023-03-01 RX ORDER — AZITHROMYCIN 250 MG/1
250 TABLET, FILM COATED ORAL SEE ADMIN INSTRUCTIONS
Qty: 6 TABLET | Refills: 0 | Status: SHIPPED | OUTPATIENT
Start: 2023-03-01 | End: 2023-03-06

## 2023-03-01 NOTE — TELEPHONE ENCOUNTER
PT STATES SHE CAN ONLY USE HER DISCOUNT RX CARD AT [facility]. [address] [place] [address]. PLEASE SEND THE PREDNISONE AND ABX TO [address] INSTEAD. ORDERS PENDED.

## 2023-03-07 ENCOUNTER — TELEPHONE (OUTPATIENT)
Dept: INTERNAL MEDICINE CLINIC | Age: 78
End: 2023-03-07

## 2023-03-07 DIAGNOSIS — J01.00 ACUTE NON-RECURRENT MAXILLARY SINUSITIS: Primary | ICD-10-CM

## 2023-03-07 RX ORDER — AMOXICILLIN AND CLAVULANATE POTASSIUM 875; 125 MG/1; MG/1
1 TABLET, FILM COATED ORAL 2 TIMES DAILY
Qty: 20 TABLET | Refills: 0 | Status: SHIPPED | OUTPATIENT
Start: 2023-03-07 | End: 2023-03-17

## 2023-03-07 NOTE — TELEPHONE ENCOUNTER
PT STATES THAT SHE HAS COMPLETED THE ZPAK AND PREDNISONE. SHE STILL HAS A LOT OF SINUS PRESSURE AND SINUSITIS. SHE IS REQUESTING SOMETHING STRONGER. PLEASE SEND  [address]. SHE DENIES FEVER AND TOOK A - COVID TEST. PLEASE ADVISE.

## 2023-05-08 ENCOUNTER — OFFICE VISIT (OUTPATIENT)
Dept: INTERNAL MEDICINE CLINIC | Age: 78
End: 2023-05-08
Payer: MEDICARE

## 2023-05-08 VITALS
HEART RATE: 84 BPM | DIASTOLIC BLOOD PRESSURE: 68 MMHG | SYSTOLIC BLOOD PRESSURE: 118 MMHG | BODY MASS INDEX: 24.22 KG/M2 | RESPIRATION RATE: 16 BRPM | OXYGEN SATURATION: 96 % | WEIGHT: 124 LBS

## 2023-05-08 DIAGNOSIS — K57.92 DIVERTICULITIS: Primary | ICD-10-CM

## 2023-05-08 DIAGNOSIS — E78.2 MIXED HYPERLIPIDEMIA: ICD-10-CM

## 2023-05-08 DIAGNOSIS — R10.30 LOWER ABDOMINAL PAIN: ICD-10-CM

## 2023-05-08 DIAGNOSIS — I10 ESSENTIAL HYPERTENSION: ICD-10-CM

## 2023-05-08 DIAGNOSIS — K57.92 DIVERTICULITIS: ICD-10-CM

## 2023-05-08 DIAGNOSIS — N18.30 STAGE 3 CHRONIC KIDNEY DISEASE, UNSPECIFIED WHETHER STAGE 3A OR 3B CKD (HCC): ICD-10-CM

## 2023-05-08 DIAGNOSIS — F41.9 ANXIETY: ICD-10-CM

## 2023-05-08 LAB
ALBUMIN SERPL-MCNC: 4 G/DL (ref 3.4–5)
ALBUMIN/GLOB SERPL: 2 {RATIO} (ref 1.1–2.2)
ALP SERPL-CCNC: 109 U/L (ref 40–129)
ALT SERPL-CCNC: 20 U/L (ref 10–40)
ANION GAP SERPL CALCULATED.3IONS-SCNC: 11 MMOL/L (ref 3–16)
AST SERPL-CCNC: 24 U/L (ref 15–37)
BILIRUB SERPL-MCNC: 0.5 MG/DL (ref 0–1)
BUN SERPL-MCNC: 23 MG/DL (ref 7–20)
CALCIUM SERPL-MCNC: 9.1 MG/DL (ref 8.3–10.6)
CHLORIDE SERPL-SCNC: 106 MMOL/L (ref 99–110)
CHOLEST SERPL-MCNC: 157 MG/DL (ref 0–199)
CO2 SERPL-SCNC: 25 MMOL/L (ref 21–32)
CREAT SERPL-MCNC: 1.2 MG/DL (ref 0.6–1.2)
DEPRECATED RDW RBC AUTO: 14.1 % (ref 12.4–15.4)
GFR SERPLBLD CREATININE-BSD FMLA CKD-EPI: 46 ML/MIN/{1.73_M2}
GLUCOSE SERPL-MCNC: 102 MG/DL (ref 70–99)
HCT VFR BLD AUTO: 34.6 % (ref 36–48)
HDLC SERPL-MCNC: 70 MG/DL (ref 40–60)
HGB BLD-MCNC: 11.8 G/DL (ref 12–16)
LDLC SERPL CALC-MCNC: 71 MG/DL
MCH RBC QN AUTO: 30.5 PG (ref 26–34)
MCHC RBC AUTO-ENTMCNC: 34 G/DL (ref 31–36)
MCV RBC AUTO: 89.6 FL (ref 80–100)
PLATELET # BLD AUTO: 188 K/UL (ref 135–450)
PMV BLD AUTO: 9.7 FL (ref 5–10.5)
POTASSIUM SERPL-SCNC: 3.8 MMOL/L (ref 3.5–5.1)
PROT SERPL-MCNC: 6 G/DL (ref 6.4–8.2)
RBC # BLD AUTO: 3.87 M/UL (ref 4–5.2)
SODIUM SERPL-SCNC: 142 MMOL/L (ref 136–145)
TRIGL SERPL-MCNC: 79 MG/DL (ref 0–150)
TSH SERPL DL<=0.005 MIU/L-ACNC: 1.79 UIU/ML (ref 0.27–4.2)
VLDLC SERPL CALC-MCNC: 16 MG/DL
WBC # BLD AUTO: 6 K/UL (ref 4–11)

## 2023-05-08 PROCEDURE — G8427 DOCREV CUR MEDS BY ELIG CLIN: HCPCS | Performed by: INTERNAL MEDICINE

## 2023-05-08 PROCEDURE — G8420 CALC BMI NORM PARAMETERS: HCPCS | Performed by: INTERNAL MEDICINE

## 2023-05-08 PROCEDURE — 99214 OFFICE O/P EST MOD 30 MIN: CPT | Performed by: INTERNAL MEDICINE

## 2023-05-08 PROCEDURE — 3074F SYST BP LT 130 MM HG: CPT | Performed by: INTERNAL MEDICINE

## 2023-05-08 PROCEDURE — 3078F DIAST BP <80 MM HG: CPT | Performed by: INTERNAL MEDICINE

## 2023-05-08 PROCEDURE — 1090F PRES/ABSN URINE INCON ASSESS: CPT | Performed by: INTERNAL MEDICINE

## 2023-05-08 PROCEDURE — 1036F TOBACCO NON-USER: CPT | Performed by: INTERNAL MEDICINE

## 2023-05-08 PROCEDURE — 81002 URINALYSIS NONAUTO W/O SCOPE: CPT | Performed by: INTERNAL MEDICINE

## 2023-05-08 PROCEDURE — 36415 COLL VENOUS BLD VENIPUNCTURE: CPT | Performed by: INTERNAL MEDICINE

## 2023-05-08 PROCEDURE — G8399 PT W/DXA RESULTS DOCUMENT: HCPCS | Performed by: INTERNAL MEDICINE

## 2023-05-08 PROCEDURE — 1123F ACP DISCUSS/DSCN MKR DOCD: CPT | Performed by: INTERNAL MEDICINE

## 2023-05-08 RX ORDER — AMOXICILLIN AND CLAVULANATE POTASSIUM 875; 125 MG/1; MG/1
1 TABLET, FILM COATED ORAL 2 TIMES DAILY
Qty: 20 TABLET | Refills: 0 | Status: SHIPPED | OUTPATIENT
Start: 2023-05-08 | End: 2023-05-18

## 2023-05-08 RX ORDER — OXYCODONE HYDROCHLORIDE AND ACETAMINOPHEN 5; 325 MG/1; MG/1
1 TABLET ORAL EVERY 8 HOURS PRN
Qty: 20 TABLET | Refills: 0 | Status: SHIPPED | OUTPATIENT
Start: 2023-05-08 | End: 2023-05-15

## 2023-05-08 SDOH — ECONOMIC STABILITY: FOOD INSECURITY: WITHIN THE PAST 12 MONTHS, THE FOOD YOU BOUGHT JUST DIDN'T LAST AND YOU DIDN'T HAVE MONEY TO GET MORE.: PATIENT DECLINED

## 2023-05-08 SDOH — ECONOMIC STABILITY: FOOD INSECURITY: WITHIN THE PAST 12 MONTHS, YOU WORRIED THAT YOUR FOOD WOULD RUN OUT BEFORE YOU GOT MONEY TO BUY MORE.: PATIENT DECLINED

## 2023-05-08 SDOH — ECONOMIC STABILITY: INCOME INSECURITY: HOW HARD IS IT FOR YOU TO PAY FOR THE VERY BASICS LIKE FOOD, HOUSING, MEDICAL CARE, AND HEATING?: PATIENT DECLINED

## 2023-05-08 SDOH — ECONOMIC STABILITY: HOUSING INSECURITY
IN THE LAST 12 MONTHS, WAS THERE A TIME WHEN YOU DID NOT HAVE A STEADY PLACE TO SLEEP OR SLEPT IN A SHELTER (INCLUDING NOW)?: PATIENT REFUSED

## 2023-05-08 NOTE — PROGRESS NOTES
PLAN:    Dai Perez was seen today for abdominal pain. Diagnoses and all orders for this visit:    Diverticulitis- CLINICALLY W PROB DIVERTICULITIS, WE'LL CHECK CT AND LAB, START EMPIRIC COURSE AUGMENTIN AND ALSO PRN PERCOCET, F/U TO REASSESS ONE WEEK  -     Comprehensive Metabolic Panel; Future  -     CBC; Future  -     amoxicillin-clavulanate (AUGMENTIN) 875-125 MG per tablet; Take 1 tablet by mouth 2 times daily for 10 days  -     CT ABDOMEN PELVIS WO CONTRAST Additional Contrast? Oral; Future  -     oxyCODONE-acetaminophen (PERCOCET) 5-325 MG per tablet; Take 1 tablet by mouth every 8 hours as needed for Pain for up to 7 days. Intended supply: 7 days. Take lowest dose possible to manage pain Max Daily Amount: 3 tablets  -     TSH; Future  -     CT ABDOMEN PELVIS W IV CONTRAST Additional Contrast? Oral; Future    Lower abdominal pain- UNABLE TO GIVE URINE SPECIMEN BUT CLINICALLY MORE C/W DIVERTICULITIS.  -     POCT Urinalysis no Micro  -     Comprehensive Metabolic Panel; Future  -     CBC; Future  -     CT ABDOMEN PELVIS WO CONTRAST Additional Contrast? Oral; Future  -     CT ABDOMEN PELVIS W IV CONTRAST Additional Contrast? Oral; Future    Anxiety- COPING OK W AMANDA'S PASSING, CONT SSRI    Essential hypertension - The current medical regimen is effective;  continue present plan and medications. Mixed hyperlipidemia  -Pt will continue to work on a low fat diet and also exercise, wt loss as appropriate. Will continue periodic monitoring of fasting lipid profile, glucose, liver function.    -     TSH; Future  -     Lipid Panel; Future    Stage 3 chronic kidney disease, unspecified whether stage 3a or 3b CKD ([facility] [address]- CONT MONITOR RENAL FXN AS WELL  -     Comprehensive Metabolic Panel;  Future

## 2023-05-09 DIAGNOSIS — R10.30 LOWER ABDOMINAL PAIN: ICD-10-CM

## 2023-05-09 DIAGNOSIS — K57.92 DIVERTICULITIS: ICD-10-CM

## 2023-05-09 NOTE — RESULT ENCOUNTER NOTE
Please call pt, her ct is c/w diverticulitis as we suspected.   Cont w abx and clear liquid diet/soft diet for at least the next 3d then advance as tolerated and I'll reassess w her general appt sched for next week

## 2023-05-15 ENCOUNTER — OFFICE VISIT (OUTPATIENT)
Dept: INTERNAL MEDICINE CLINIC | Age: 78
End: 2023-05-15
Payer: MEDICARE

## 2023-05-15 VITALS
BODY MASS INDEX: 24.35 KG/M2 | SYSTOLIC BLOOD PRESSURE: 130 MMHG | DIASTOLIC BLOOD PRESSURE: 72 MMHG | RESPIRATION RATE: 12 BRPM | WEIGHT: 124 LBS | HEIGHT: 60 IN | OXYGEN SATURATION: 98 % | HEART RATE: 76 BPM

## 2023-05-15 DIAGNOSIS — Z00.00 ROUTINE GENERAL MEDICAL EXAMINATION AT A HEALTH CARE FACILITY: Primary | ICD-10-CM

## 2023-05-15 DIAGNOSIS — E78.2 MIXED HYPERLIPIDEMIA: ICD-10-CM

## 2023-05-15 DIAGNOSIS — Z00.00 MEDICARE ANNUAL WELLNESS VISIT, SUBSEQUENT: ICD-10-CM

## 2023-05-15 DIAGNOSIS — N18.30 STAGE 3 CHRONIC KIDNEY DISEASE, UNSPECIFIED WHETHER STAGE 3A OR 3B CKD (HCC): ICD-10-CM

## 2023-05-15 DIAGNOSIS — I10 ESSENTIAL HYPERTENSION: ICD-10-CM

## 2023-05-15 DIAGNOSIS — K57.92 DIVERTICULITIS: ICD-10-CM

## 2023-05-15 PROCEDURE — 1123F ACP DISCUSS/DSCN MKR DOCD: CPT | Performed by: INTERNAL MEDICINE

## 2023-05-15 PROCEDURE — 3078F DIAST BP <80 MM HG: CPT | Performed by: INTERNAL MEDICINE

## 2023-05-15 PROCEDURE — 3075F SYST BP GE 130 - 139MM HG: CPT | Performed by: INTERNAL MEDICINE

## 2023-05-15 PROCEDURE — G0439 PPPS, SUBSEQ VISIT: HCPCS | Performed by: INTERNAL MEDICINE

## 2023-05-15 ASSESSMENT — PATIENT HEALTH QUESTIONNAIRE - PHQ9
2. FEELING DOWN, DEPRESSED OR HOPELESS: 0
SUM OF ALL RESPONSES TO PHQ QUESTIONS 1-9: 0
SUM OF ALL RESPONSES TO PHQ9 QUESTIONS 1 & 2: 0
SUM OF ALL RESPONSES TO PHQ QUESTIONS 1-9: 0
SUM OF ALL RESPONSES TO PHQ QUESTIONS 1-9: 0
1. LITTLE INTEREST OR PLEASURE IN DOING THINGS: 0
SUM OF ALL RESPONSES TO PHQ QUESTIONS 1-9: 0

## 2023-05-15 ASSESSMENT — LIFESTYLE VARIABLES
HOW OFTEN DO YOU HAVE A DRINK CONTAINING ALCOHOL: NEVER
HOW MANY STANDARD DRINKS CONTAINING ALCOHOL DO YOU HAVE ON A TYPICAL DAY: PATIENT DOES NOT DRINK

## 2023-05-15 NOTE — PROGRESS NOTES
Medicare Annual Wellness Visit    [address] is here for Medicare AWV    Assessment & Plan   Routine general medical examination at a health care facility - remains independent, functional and active, no indications/needs for other interventions noted at this time- except as noted below and also findings noted on screening medicare questions. COPING OK, STAYING [address] SUDDENLY AND VERY RECENTLY    Essential hypertension - Blood pressure stable and will continue current regimen. Will plan periodic monitoring of renal function, electrolytes, lipid profile. Mixed hyperlipidemia  Diverticulitis  Stage 3 chronic kidney disease, unspecified whether stage 3a or 3b CKD ([address]  Medicare annual wellness visit, subsequent    Recommendations for Preventive Services Due: see orders and patient instructions/AVS.  Recommended screening schedule for the next 5-10 years is provided to the patient in written form: see Patient Instructions/AVS.     Return in about 6 months (around 11/15/2023) for routine. Subjective   The following acute and/or chronic problems were also addressed today:  DIVERTICULITIS IS IMPROVING FR LAST APPT. Hypertension: Stable. Denies CP, SOB, cough, visual changes, dizziness, palpitations or HA. Lipids:  Is continuing statin therapy and low fat diet. Tolerating medications w/o myalgias or GI upset. CKD, her creat has improved from prior 1.5 now to 1.2. Patient's complete Health Risk Assessment and screening values have been reviewed and are found in Flowsheets. The following problems were reviewed today and where indicated follow up appointments were made and/or referrals ordered. Positive Risk Factor Screenings with Interventions:                                       Objective   Vitals:    05/15/23 1012   BP: 130/72   Pulse: 76   Resp: 12   SpO2: 98%   Weight: 124 lb (56.2 kg)   Height: 5' (1.524 m)      Body mass index is 24.22 kg/m².       General

## 2023-07-27 DIAGNOSIS — I10 ESSENTIAL HYPERTENSION: ICD-10-CM

## 2023-07-27 RX ORDER — HYDROCHLOROTHIAZIDE 12.5 MG/1
CAPSULE, GELATIN COATED ORAL
Qty: 90 CAPSULE | Refills: 3 | Status: SHIPPED | OUTPATIENT
Start: 2023-07-27

## 2023-10-11 ENCOUNTER — NURSE ONLY (OUTPATIENT)
Dept: INTERNAL MEDICINE CLINIC | Age: 78
End: 2023-10-11
Payer: MEDICARE

## 2023-10-11 DIAGNOSIS — Z23 NEED FOR INFLUENZA VACCINATION: Primary | ICD-10-CM

## 2023-10-11 PROCEDURE — 90694 VACC AIIV4 NO PRSRV 0.5ML IM: CPT | Performed by: INTERNAL MEDICINE

## 2023-10-11 PROCEDURE — G0008 ADMIN INFLUENZA VIRUS VAC: HCPCS | Performed by: INTERNAL MEDICINE

## 2023-10-20 DIAGNOSIS — F41.9 ANXIETY: ICD-10-CM

## 2023-10-20 RX ORDER — ESCITALOPRAM OXALATE 10 MG/1
10 TABLET ORAL EVERY MORNING
Qty: 30 TABLET | Refills: 1 | Status: SHIPPED | OUTPATIENT
Start: 2023-10-20

## 2023-11-15 ENCOUNTER — OFFICE VISIT (OUTPATIENT)
Dept: INTERNAL MEDICINE CLINIC | Age: 78
End: 2023-11-15
Payer: MEDICARE

## 2023-11-15 VITALS
WEIGHT: 120 LBS | RESPIRATION RATE: 15 BRPM | HEART RATE: 74 BPM | DIASTOLIC BLOOD PRESSURE: 74 MMHG | SYSTOLIC BLOOD PRESSURE: 118 MMHG | OXYGEN SATURATION: 97 % | BODY MASS INDEX: 23.44 KG/M2

## 2023-11-15 DIAGNOSIS — E53.8 B12 DEFICIENCY: ICD-10-CM

## 2023-11-15 DIAGNOSIS — I10 PRIMARY HYPERTENSION: Primary | ICD-10-CM

## 2023-11-15 DIAGNOSIS — E78.2 MIXED HYPERLIPIDEMIA: ICD-10-CM

## 2023-11-15 DIAGNOSIS — M81.0 AGE-RELATED OSTEOPOROSIS WITHOUT CURRENT PATHOLOGICAL FRACTURE: ICD-10-CM

## 2023-11-15 DIAGNOSIS — I10 PRIMARY HYPERTENSION: ICD-10-CM

## 2023-11-15 DIAGNOSIS — R32 FEMALE INCONTINENCE: ICD-10-CM

## 2023-11-15 DIAGNOSIS — F41.9 ANXIETY: ICD-10-CM

## 2023-11-15 LAB
DEPRECATED RDW RBC AUTO: 12.9 % (ref 12.4–15.4)
HCT VFR BLD AUTO: 35.7 % (ref 36–48)
HGB BLD-MCNC: 12.6 G/DL (ref 12–16)
MCH RBC QN AUTO: 31.1 PG (ref 26–34)
MCHC RBC AUTO-ENTMCNC: 35.2 G/DL (ref 31–36)
MCV RBC AUTO: 88.1 FL (ref 80–100)
PLATELET # BLD AUTO: 198 K/UL (ref 135–450)
PMV BLD AUTO: 9.4 FL (ref 5–10.5)
RBC # BLD AUTO: 4.05 M/UL (ref 4–5.2)
WBC # BLD AUTO: 4.8 K/UL (ref 4–11)

## 2023-11-15 PROCEDURE — G8399 PT W/DXA RESULTS DOCUMENT: HCPCS | Performed by: INTERNAL MEDICINE

## 2023-11-15 PROCEDURE — G8427 DOCREV CUR MEDS BY ELIG CLIN: HCPCS | Performed by: INTERNAL MEDICINE

## 2023-11-15 PROCEDURE — 36415 COLL VENOUS BLD VENIPUNCTURE: CPT | Performed by: INTERNAL MEDICINE

## 2023-11-15 PROCEDURE — 3074F SYST BP LT 130 MM HG: CPT | Performed by: INTERNAL MEDICINE

## 2023-11-15 PROCEDURE — 1090F PRES/ABSN URINE INCON ASSESS: CPT | Performed by: INTERNAL MEDICINE

## 2023-11-15 PROCEDURE — G8484 FLU IMMUNIZE NO ADMIN: HCPCS | Performed by: INTERNAL MEDICINE

## 2023-11-15 PROCEDURE — 1036F TOBACCO NON-USER: CPT | Performed by: INTERNAL MEDICINE

## 2023-11-15 PROCEDURE — G8420 CALC BMI NORM PARAMETERS: HCPCS | Performed by: INTERNAL MEDICINE

## 2023-11-15 PROCEDURE — 3078F DIAST BP <80 MM HG: CPT | Performed by: INTERNAL MEDICINE

## 2023-11-15 PROCEDURE — 99214 OFFICE O/P EST MOD 30 MIN: CPT | Performed by: INTERNAL MEDICINE

## 2023-11-15 PROCEDURE — 1123F ACP DISCUSS/DSCN MKR DOCD: CPT | Performed by: INTERNAL MEDICINE

## 2023-11-15 PROCEDURE — 0509F URINE INCON PLAN DOCD: CPT | Performed by: INTERNAL MEDICINE

## 2023-11-15 RX ORDER — ESCITALOPRAM OXALATE 10 MG/1
10 TABLET ORAL EVERY MORNING
Qty: 90 TABLET | Refills: 1 | Status: SHIPPED | OUTPATIENT
Start: 2023-11-15

## 2023-11-15 RX ORDER — ALENDRONATE SODIUM 70 MG/1
70 TABLET ORAL
Qty: 12 TABLET | Refills: 3 | Status: SHIPPED | OUTPATIENT
Start: 2023-11-15

## 2023-11-15 NOTE — PROGRESS NOTES
visit:    Primary hypertension  - Blood pressure stable and will continue current regimen. Will plan periodic monitoring of renal function, electrolytes, lipid profile. -     Lipid Panel; Future  -     Comprehensive Metabolic Panel; Future  -     TSH; Future  -     CBC; Future    Anxiety - Mood stable on current regimen w/o any significant manifestations of severe depression or anxiety noted at this time. Cont current meds. -     escitalopram (LEXAPRO) 10 MG tablet; Take 1 tablet by mouth every morning    Mixed hyperlipidemia  - Pt will continue to work on a low fat diet and also exercise, wt loss as appropriate. Will continue periodic monitoring of fasting lipid profile, glucose, liver function.    -     Lipid Panel; Future  -     Comprehensive Metabolic Panel; Future  -     TSH; Future    B12 deficiency - f/u levels, if low may need a lower dose supplement  -     Vitamin B12; Future    Female incontinence- stable on meds and monitor    Age-related osteoporosis without current pathological fracture- restart fosamax, check vit D, monitor  -     alendronate (FOSAMAX) 70 MG tablet; Take 1 tablet by mouth every 7 days Take with a full glass of water upon arising for the day. Consume on an empty stomach at least 30 minutes before the first food, beverage, or medication. Stay upright (do not lie down) for at least 30 minutes. Do not crush or break. -     Vitamin D 25 Hydroxy;  Future

## 2023-11-16 LAB
25(OH)D3 SERPL-MCNC: 24.1 NG/ML
ALBUMIN SERPL-MCNC: 4.3 G/DL (ref 3.4–5)
ALBUMIN/GLOB SERPL: 2.4 {RATIO} (ref 1.1–2.2)
ALP SERPL-CCNC: 101 U/L (ref 40–129)
ALT SERPL-CCNC: 10 U/L (ref 10–40)
ANION GAP SERPL CALCULATED.3IONS-SCNC: 10 MMOL/L (ref 3–16)
AST SERPL-CCNC: 16 U/L (ref 15–37)
BILIRUB SERPL-MCNC: 0.5 MG/DL (ref 0–1)
BUN SERPL-MCNC: 30 MG/DL (ref 7–20)
CALCIUM SERPL-MCNC: 9.3 MG/DL (ref 8.3–10.6)
CHLORIDE SERPL-SCNC: 103 MMOL/L (ref 99–110)
CHOLEST SERPL-MCNC: 199 MG/DL (ref 0–199)
CO2 SERPL-SCNC: 28 MMOL/L (ref 21–32)
CREAT SERPL-MCNC: 1.1 MG/DL (ref 0.6–1.2)
GFR SERPLBLD CREATININE-BSD FMLA CKD-EPI: 51 ML/MIN/{1.73_M2}
GLUCOSE SERPL-MCNC: 90 MG/DL (ref 70–99)
HDLC SERPL-MCNC: 64 MG/DL (ref 40–60)
LDLC SERPL CALC-MCNC: 115 MG/DL
POTASSIUM SERPL-SCNC: 4.2 MMOL/L (ref 3.5–5.1)
PROT SERPL-MCNC: 6.1 G/DL (ref 6.4–8.2)
SODIUM SERPL-SCNC: 141 MMOL/L (ref 136–145)
TRIGL SERPL-MCNC: 99 MG/DL (ref 0–150)
TSH SERPL DL<=0.005 MIU/L-ACNC: 1.31 UIU/ML (ref 0.27–4.2)
VIT B12 SERPL-MCNC: 276 PG/ML (ref 211–911)
VLDLC SERPL CALC-MCNC: 20 MG/DL

## 2023-11-17 DIAGNOSIS — E55.9 VITAMIN D DEFICIENCY: Primary | ICD-10-CM

## 2023-11-17 RX ORDER — MELATONIN
1000 DAILY
Qty: 90 TABLET | Refills: 3 | Status: SHIPPED | OUTPATIENT
Start: 2023-11-17

## 2023-11-17 NOTE — RESULT ENCOUNTER NOTE
Call pt, labs ok/chol ok, b12 and thyroid fxn.  Only issue is mildly low vit D, rec to start vit D 1000 units/day.  - I sent script

## 2023-12-11 ENCOUNTER — OFFICE VISIT (OUTPATIENT)
Dept: INTERNAL MEDICINE CLINIC | Age: 78
End: 2023-12-11
Payer: MEDICARE

## 2023-12-11 VITALS
DIASTOLIC BLOOD PRESSURE: 70 MMHG | WEIGHT: 116 LBS | SYSTOLIC BLOOD PRESSURE: 125 MMHG | BODY MASS INDEX: 22.78 KG/M2 | RESPIRATION RATE: 16 BRPM | OXYGEN SATURATION: 97 % | HEART RATE: 72 BPM | HEIGHT: 60 IN

## 2023-12-11 DIAGNOSIS — K57.92 DIVERTICULITIS: Primary | ICD-10-CM

## 2023-12-11 DIAGNOSIS — I10 PRIMARY HYPERTENSION: ICD-10-CM

## 2023-12-11 PROCEDURE — 1036F TOBACCO NON-USER: CPT | Performed by: INTERNAL MEDICINE

## 2023-12-11 PROCEDURE — 1123F ACP DISCUSS/DSCN MKR DOCD: CPT | Performed by: INTERNAL MEDICINE

## 2023-12-11 PROCEDURE — 99214 OFFICE O/P EST MOD 30 MIN: CPT | Performed by: INTERNAL MEDICINE

## 2023-12-11 PROCEDURE — 3078F DIAST BP <80 MM HG: CPT | Performed by: INTERNAL MEDICINE

## 2023-12-11 PROCEDURE — 3074F SYST BP LT 130 MM HG: CPT | Performed by: INTERNAL MEDICINE

## 2023-12-11 PROCEDURE — G8484 FLU IMMUNIZE NO ADMIN: HCPCS | Performed by: INTERNAL MEDICINE

## 2023-12-11 PROCEDURE — G8427 DOCREV CUR MEDS BY ELIG CLIN: HCPCS | Performed by: INTERNAL MEDICINE

## 2023-12-11 PROCEDURE — G8420 CALC BMI NORM PARAMETERS: HCPCS | Performed by: INTERNAL MEDICINE

## 2023-12-11 PROCEDURE — G8399 PT W/DXA RESULTS DOCUMENT: HCPCS | Performed by: INTERNAL MEDICINE

## 2023-12-11 PROCEDURE — 1090F PRES/ABSN URINE INCON ASSESS: CPT | Performed by: INTERNAL MEDICINE

## 2023-12-11 RX ORDER — AMOXICILLIN AND CLAVULANATE POTASSIUM 875; 125 MG/1; MG/1
1 TABLET, FILM COATED ORAL 2 TIMES DAILY
Qty: 20 TABLET | Refills: 0 | Status: SHIPPED | OUTPATIENT
Start: 2023-12-11 | End: 2023-12-21

## 2023-12-11 RX ORDER — OXYCODONE HYDROCHLORIDE AND ACETAMINOPHEN 5; 325 MG/1; MG/1
1 TABLET ORAL EVERY 8 HOURS PRN
Qty: 20 TABLET | Refills: 0 | Status: SHIPPED | OUTPATIENT
Start: 2023-12-11 | End: 2023-12-18

## 2024-01-01 ENCOUNTER — TELEPHONE (OUTPATIENT)
Dept: INTERNAL MEDICINE CLINIC | Age: 79
End: 2024-01-01

## 2024-01-01 RX ORDER — BENZONATATE 100 MG/1
100 CAPSULE ORAL 3 TIMES DAILY PRN
Qty: 30 CAPSULE | Refills: 0 | Status: SHIPPED | OUTPATIENT
Start: 2024-01-01 | End: 2024-01-11

## 2024-01-02 NOTE — TELEPHONE ENCOUNTER
(+) for covid - severe cough, temp to 100.6; had gi symptoms last week.    No SOB, wheezing    Start paxlovid - she is not on trazodone  Start tessalon    Indications for ER discussed

## 2024-01-17 ENCOUNTER — HOSPITAL ENCOUNTER (OUTPATIENT)
Dept: WOMENS IMAGING | Age: 79
Discharge: HOME | End: 2024-01-17
Payer: MEDICARE

## 2024-01-17 VITALS — HEIGHT: 60 IN | WEIGHT: 118 LBS | BODY MASS INDEX: 23.16 KG/M2

## 2024-01-17 DIAGNOSIS — Z12.31 SCREENING MAMMOGRAM, ENCOUNTER FOR: ICD-10-CM

## 2024-01-17 PROCEDURE — 77063 BREAST TOMOSYNTHESIS BI: CPT

## 2024-02-05 ENCOUNTER — OFFICE VISIT (OUTPATIENT)
Dept: INTERNAL MEDICINE CLINIC | Age: 79
End: 2024-02-05

## 2024-02-05 VITALS
HEART RATE: 96 BPM | HEIGHT: 60 IN | BODY MASS INDEX: 23.36 KG/M2 | SYSTOLIC BLOOD PRESSURE: 108 MMHG | WEIGHT: 119 LBS | RESPIRATION RATE: 16 BRPM | OXYGEN SATURATION: 96 % | DIASTOLIC BLOOD PRESSURE: 60 MMHG

## 2024-02-05 DIAGNOSIS — R42 DIZZINESS: ICD-10-CM

## 2024-02-05 DIAGNOSIS — I10 ESSENTIAL HYPERTENSION: ICD-10-CM

## 2024-02-05 DIAGNOSIS — R82.90 ABNORMAL URINE ODOR: Primary | ICD-10-CM

## 2024-02-05 PROBLEM — N18.30 CHRONIC RENAL DISEASE, STAGE III (HCC): Status: RESOLVED | Noted: 2022-06-08 | Resolved: 2024-02-05

## 2024-02-05 PROBLEM — N18.30 STAGE 3 CHRONIC KIDNEY DISEASE (HCC): Status: RESOLVED | Noted: 2019-06-06 | Resolved: 2024-02-05

## 2024-02-05 LAB
BILIRUBIN, POC: NORMAL
BLOOD URINE, POC: NORMAL
CLARITY, POC: CLEAR
COLOR, POC: NORMAL
GLUCOSE URINE, POC: NORMAL
KETONES, POC: NORMAL
LEUKOCYTE EST, POC: NORMAL
NITRITE, POC: NORMAL
PH, POC: 5.5
PROTEIN, POC: NORMAL
SPECIFIC GRAVITY, POC: 1.2
UROBILINOGEN, POC: 0.2

## 2024-02-05 RX ORDER — HYDROCHLOROTHIAZIDE 12.5 MG/1
12.5 CAPSULE, GELATIN COATED ORAL DAILY PRN
Qty: 90 CAPSULE | Refills: 3
Start: 2024-02-05

## 2024-02-05 RX ORDER — CIPROFLOXACIN 250 MG/1
250 TABLET, FILM COATED ORAL 2 TIMES DAILY
Qty: 6 TABLET | Refills: 0 | Status: SHIPPED | OUTPATIENT
Start: 2024-02-05 | End: 2024-02-08

## 2024-02-05 ASSESSMENT — PATIENT HEALTH QUESTIONNAIRE - PHQ9
2. FEELING DOWN, DEPRESSED OR HOPELESS: 0
SUM OF ALL RESPONSES TO PHQ QUESTIONS 1-9: 0
SUM OF ALL RESPONSES TO PHQ9 QUESTIONS 1 & 2: 0
SUM OF ALL RESPONSES TO PHQ QUESTIONS 1-9: 0
1. LITTLE INTEREST OR PLEASURE IN DOING THINGS: 0
SUM OF ALL RESPONSES TO PHQ QUESTIONS 1-9: 0
SUM OF ALL RESPONSES TO PHQ QUESTIONS 1-9: 0

## 2024-02-05 NOTE — PROGRESS NOTES
ASSESSMENT:    1. Abnormal urine odor    2. Dizziness    3. Essential hypertension        PLAN:    Saskia was seen today for urinary tract infection.    Diagnoses and all orders for this visit:    Abnormal urine odor- UA SUGGESTS MILD UTI, EMPIRIC RX- CIPRO-  AND to call back one week if not improving.      -     POCT Urinalysis no Micro    Dizziness- SUSPECT FR LOW BP AND WE'LL TRY TO SWITCH HCTZ TO PRN, MAY BE ABLE TO STOP COMPLETELY IF BP REMAINS NORMOTENSIVE.  -     POCT Urinalysis no Micro    Essential hypertension- Plan as noted    -     hydroCHLOROthiazide 12.5 MG capsule; Take 1 capsule by mouth daily as needed (take in am only if systolic bp is >130.)    Other orders  -     ciprofloxacin (CIPRO) 250 MG tablet; Take 1 tablet by mouth 2 times daily for 3 days

## 2024-02-05 NOTE — PATIENT INSTRUCTIONS
Take the hydrochlorothiazide only as needed in am if systolic bp is >130.      IF BP IS STABILIZED IN A MONTH, THEN NEXT TRY CUTTING YOUR LEXAPRO TO 1/2 TAB NIGHTLY TO SEE IF HELPS W NIGHT SWEATS.  BE SURE MOOD IS STABLE ON LOWER DOSE SO IF ANY DEPRESSION INCR BACK TO 10MG DA/Y.

## 2024-03-11 ENCOUNTER — OFFICE VISIT (OUTPATIENT)
Dept: INTERNAL MEDICINE CLINIC | Age: 79
End: 2024-03-11
Payer: MEDICARE

## 2024-03-11 VITALS
DIASTOLIC BLOOD PRESSURE: 78 MMHG | RESPIRATION RATE: 14 BRPM | HEIGHT: 60 IN | BODY MASS INDEX: 23.98 KG/M2 | HEART RATE: 82 BPM | WEIGHT: 122.13 LBS | SYSTOLIC BLOOD PRESSURE: 140 MMHG | OXYGEN SATURATION: 96 %

## 2024-03-11 DIAGNOSIS — R73.9 HYPERGLYCEMIA: ICD-10-CM

## 2024-03-11 DIAGNOSIS — I10 PRIMARY HYPERTENSION: Primary | ICD-10-CM

## 2024-03-11 DIAGNOSIS — E53.8 B12 DEFICIENCY: ICD-10-CM

## 2024-03-11 DIAGNOSIS — R20.0 NUMBNESS OF RIGHT FOOT: ICD-10-CM

## 2024-03-11 LAB
CHP ED QC CHECK: NORMAL
GLUCOSE BLD-MCNC: 112 MG/DL

## 2024-03-11 PROCEDURE — 3078F DIAST BP <80 MM HG: CPT | Performed by: INTERNAL MEDICINE

## 2024-03-11 PROCEDURE — 1090F PRES/ABSN URINE INCON ASSESS: CPT | Performed by: INTERNAL MEDICINE

## 2024-03-11 PROCEDURE — 82962 GLUCOSE BLOOD TEST: CPT | Performed by: INTERNAL MEDICINE

## 2024-03-11 PROCEDURE — G8427 DOCREV CUR MEDS BY ELIG CLIN: HCPCS | Performed by: INTERNAL MEDICINE

## 2024-03-11 PROCEDURE — 1036F TOBACCO NON-USER: CPT | Performed by: INTERNAL MEDICINE

## 2024-03-11 PROCEDURE — 3077F SYST BP >= 140 MM HG: CPT | Performed by: INTERNAL MEDICINE

## 2024-03-11 PROCEDURE — 99214 OFFICE O/P EST MOD 30 MIN: CPT | Performed by: INTERNAL MEDICINE

## 2024-03-11 PROCEDURE — G8420 CALC BMI NORM PARAMETERS: HCPCS | Performed by: INTERNAL MEDICINE

## 2024-03-11 PROCEDURE — G8399 PT W/DXA RESULTS DOCUMENT: HCPCS | Performed by: INTERNAL MEDICINE

## 2024-03-11 PROCEDURE — 1123F ACP DISCUSS/DSCN MKR DOCD: CPT | Performed by: INTERNAL MEDICINE

## 2024-03-11 PROCEDURE — G8484 FLU IMMUNIZE NO ADMIN: HCPCS | Performed by: INTERNAL MEDICINE

## 2024-03-11 RX ORDER — HYDROCHLOROTHIAZIDE 12.5 MG/1
12.5 CAPSULE, GELATIN COATED ORAL DAILY PRN
Qty: 90 CAPSULE | Refills: 0
Start: 2024-03-11

## 2024-03-11 NOTE — PROGRESS NOTES
Saskia Shen  1945 03/11/24    SUBJECTIVE:  prior lt headedness resolved coming off the hctz, but bp fluctuates rarely 160s, typically 120-130s, can rarely be low.    B12 SL LOW LAST TIME AND HAS HAD TR NUMBNESS R FOOT.  WE'LL RESTART B12 2-3X/WEEK THEN F/U FASTING LAB INCL [facility], CHOL IN MAY      Poct accucheck fasting today sl high, mentioned possible risk for preDM which can also cause foot numbness, she'd been eating cupcakes ~3x/week and advised more work w low carb diet.  Accucheck today 112.    OBJECTIVE:    BP (!) 140/78   Pulse 82   Resp 14   Ht 1.524 m (5')   Wt 55.4 kg (122 lb 2 oz)   LMP  (LMP Unknown)   SpO2 96%   BMI 23.85 kg/m²     Physical Exam  Vitals reviewed.   Constitutional:       Appearance: She is well-developed.   HENT:      Head: Normocephalic and atraumatic.      Nose: Nose normal.      Mouth/Throat:      Mouth: Mucous membranes are moist.      Pharynx: Oropharynx is clear. No oropharyngeal exudate.   Eyes:      General: No scleral icterus.        Right eye: No discharge.         Left eye: No discharge.      Conjunctiva/sclera: Conjunctivae normal.      Pupils: Pupils are equal, round, and reactive to light.   Neck:      Thyroid: No thyromegaly.      Vascular: No JVD.      Trachea: No tracheal deviation.   Cardiovascular:      Rate and Rhythm: Normal rate and regular rhythm.      Heart sounds: Normal heart sounds. No murmur heard.     No friction rub. No gallop.   Pulmonary:      Effort: Pulmonary effort is normal. No respiratory distress.      Breath sounds: Normal breath sounds. No wheezing or rales.   Abdominal:      General: Bowel sounds are normal. There is no distension.      Palpations: Abdomen is soft. There is no mass.      Tenderness: There is no abdominal tenderness. There is no guarding or rebound.      Hernia: No hernia is present.   Musculoskeletal:      Cervical back: Neck supple.      Right lower leg: No edema.      Left lower leg: No edema.

## 2024-03-11 NOTE — PATIENT INSTRUCTIONS
Continue to hold the water pill  hydrochlorothiazide  -ONLY TAKE AS NEEDED IF SYSTOLIC BP IS >160 FOR MORE THAN 3-4 HOURS.    IF YOU FIND THAT BP IS RISING MORE FREQUENTLY AND YOU ARE TAKING THE WATER PILL 2-3X/WEEK, THEN TRY SCHEDULING EVERY MON/WED/FRI

## 2024-06-12 ENCOUNTER — OFFICE VISIT (OUTPATIENT)
Dept: INTERNAL MEDICINE CLINIC | Age: 79
End: 2024-06-12

## 2024-06-12 VITALS
DIASTOLIC BLOOD PRESSURE: 68 MMHG | HEART RATE: 91 BPM | WEIGHT: 121.8 LBS | OXYGEN SATURATION: 96 % | SYSTOLIC BLOOD PRESSURE: 120 MMHG | HEIGHT: 60 IN | BODY MASS INDEX: 23.91 KG/M2

## 2024-06-12 DIAGNOSIS — Z00.00 ROUTINE GENERAL MEDICAL EXAMINATION AT A HEALTH CARE FACILITY: Primary | ICD-10-CM

## 2024-06-12 DIAGNOSIS — M81.0 AGE-RELATED OSTEOPOROSIS WITHOUT CURRENT PATHOLOGICAL FRACTURE: ICD-10-CM

## 2024-06-12 DIAGNOSIS — Z00.00 MEDICARE ANNUAL WELLNESS VISIT, SUBSEQUENT: ICD-10-CM

## 2024-06-12 DIAGNOSIS — F41.9 ANXIETY: ICD-10-CM

## 2024-06-12 DIAGNOSIS — E78.2 MIXED HYPERLIPIDEMIA: ICD-10-CM

## 2024-06-12 DIAGNOSIS — I10 PRIMARY HYPERTENSION: ICD-10-CM

## 2024-06-12 DIAGNOSIS — E55.9 VITAMIN D DEFICIENCY: ICD-10-CM

## 2024-06-12 RX ORDER — ESCITALOPRAM OXALATE 10 MG/1
10 TABLET ORAL EVERY MORNING
Qty: 90 TABLET | Refills: 1 | Status: SHIPPED | OUTPATIENT
Start: 2024-06-12

## 2024-06-12 SDOH — ECONOMIC STABILITY: FOOD INSECURITY: WITHIN THE PAST 12 MONTHS, THE FOOD YOU BOUGHT JUST DIDN'T LAST AND YOU DIDN'T HAVE MONEY TO GET MORE.: NEVER TRUE

## 2024-06-12 SDOH — ECONOMIC STABILITY: FOOD INSECURITY: WITHIN THE PAST 12 MONTHS, YOU WORRIED THAT YOUR FOOD WOULD RUN OUT BEFORE YOU GOT MONEY TO BUY MORE.: NEVER TRUE

## 2024-06-12 SDOH — ECONOMIC STABILITY: INCOME INSECURITY: HOW HARD IS IT FOR YOU TO PAY FOR THE VERY BASICS LIKE FOOD, HOUSING, MEDICAL CARE, AND HEATING?: NOT HARD AT ALL

## 2024-06-12 ASSESSMENT — PATIENT HEALTH QUESTIONNAIRE - PHQ9
1. LITTLE INTEREST OR PLEASURE IN DOING THINGS: NOT AT ALL
SUM OF ALL RESPONSES TO PHQ QUESTIONS 1-9: 0
SUM OF ALL RESPONSES TO PHQ QUESTIONS 1-9: 0
SUM OF ALL RESPONSES TO PHQ9 QUESTIONS 1 & 2: 0
SUM OF ALL RESPONSES TO PHQ QUESTIONS 1-9: 0
2. FEELING DOWN, DEPRESSED OR HOPELESS: NOT AT ALL
SUM OF ALL RESPONSES TO PHQ QUESTIONS 1-9: 0

## 2024-06-12 NOTE — PROGRESS NOTES
round, and reactive to light, extraocular eye movements intact, conjunctivae normal  Neck: supple and non-tender without mass, no thyromegaly or thyroid nodules, no cervical lymphadenopathy  Pulmonary/Chest: clear to auscultation bilaterally- no wheezes, rales or rhonchi, normal air movement, no respiratory distress  Cardiovascular: normal rate, regular rhythm, normal S1 and S2, no murmurs, rubs, clicks, or gallops, distal pulses intact, no carotid bruits  Abdomen: soft, non-tender, non-distended, normal bowel sounds, no masses or organomegaly  Extremities: no cyanosis, clubbing or edema  Musculoskeletal: normal range of motion, no joint swelling, deformity or tenderness  Neurologic: no cranial nerve deficit, gait, coordination and speech normal       Allergies   Allergen Reactions    Celebrex [Celecoxib]      pruritis    Flagyl [Metronidazole]      Nausea, stomach upset    Lisinopril      cough    Losartan Potassium      cough    Norvasc [Amlodipine Besylate]      Swelling face and legs    Nsaids      ckd    Tramadol Other (See Comments)     Upset stomach     Prior to Visit Medications    Medication Sig Taking? Authorizing Provider   hydroCHLOROthiazide 12.5 MG capsule Take 1 capsule by mouth daily as needed (take in am only if systolic bp is >160 for more than 3-4 hours) Yes Watson Dickey MD   vitamin D3 (CHOLECALCIFEROL) 25 MCG (1000 UT) TABS tablet Take 1 tablet by mouth daily Yes Watson Dickey MD   escitalopram (LEXAPRO) 10 MG tablet Take 1 tablet by mouth every morning Yes Watson Dickey MD   alendronate (FOSAMAX) 70 MG tablet Take 1 tablet by mouth every 7 days Take with a full glass of water upon arising for the day. Consume on an empty stomach at least 30 minutes before the first food, beverage, or medication. Stay upright (do not lie down) for at least 30 minutes. Do not crush or break. Yes Watson Dickey MD   fluticasone (FLONASE) 50 MCG/ACT nasal spray 2 sprays by Each Nostril

## 2024-06-12 NOTE — PATIENT INSTRUCTIONS
bathing.   Get into a tub or shower by putting in your weaker leg first. Get out with your strong side first. Have a phone or medical alert device in the bathroom with you.   Where can you learn more?  Go to https://www.[employer]ulR.net/patientEd and enter G117 to learn more about \"Preventing Falls: Care Instructions.\"  Current as of: July 17, 2023  Content Version: 14.1  © 2006-2024 [employer].   Care instructions adapted under license by [employer]. If you have questions about a medical condition or this instruction, always ask your healthcare professional. [employer] disclaims any warranty or liability for your use of this information.           Learning About Dental Care for Older Adults  Dental care for older adults: Overview  Dental care for older people is much the same as for younger adults. But older adults do have concerns that younger adults do not. Older adults may have problems with gum disease and decay on the roots of their teeth. They may need missing teeth replaced or broken fillings fixed. Or they may have dentures that need to be cared for. Some older adults may have trouble holding a toothbrush.  You can help remind the person you are caring for to brush and floss their teeth or to clean their dentures. In some cases, you may need to do the brushing and other dental care tasks. People who have trouble using their hands or who have dementia may need this extra help.  How can you help with dental care?  Normal dental care  To keep the teeth and gums healthy:  Brush the teeth with fluoride toothpaste twice a day--in the morning and at night--and floss at least once a day. Plaque can quickly build up on the teeth of older adults.  Watch for the signs of gum disease. These signs include gums that bleed after brushing or after eating hard foods, such as apples.  See a dentist regularly. Many experts recommend checkups every 6 months.  Keep the dentist up to date on any new

## 2024-06-13 ENCOUNTER — HOSPITAL ENCOUNTER (OUTPATIENT)
Age: 79
Discharge: HOME | End: 2024-06-13
Payer: MEDICARE

## 2024-06-13 DIAGNOSIS — Z00.00 ROUTINE GENERAL MEDICAL EXAMINATION AT A HEALTH CARE FACILITY: ICD-10-CM

## 2024-06-13 DIAGNOSIS — E78.2 MIXED HYPERLIPIDEMIA: ICD-10-CM

## 2024-06-13 DIAGNOSIS — E55.9 VITAMIN D DEFICIENCY: ICD-10-CM

## 2024-06-13 DIAGNOSIS — I10 PRIMARY HYPERTENSION: ICD-10-CM

## 2024-06-13 DIAGNOSIS — M81.0 AGE-RELATED OSTEOPOROSIS WITHOUT CURRENT PATHOLOGICAL FRACTURE: ICD-10-CM

## 2024-06-13 LAB
25(OH)D3 SERPL-MCNC: 24.81 NG/ML
ALBUMIN SERPL-MCNC: 4.3 GM/DL (ref 3.4–5)
ALP BLD-CCNC: 110 IU/L (ref 40–128)
ALT SERPL-CCNC: 13 U/L (ref 10–40)
ANION GAP SERPL CALCULATED.3IONS-SCNC: 11 MMOL/L (ref 7–16)
AST SERPL-CCNC: 18 IU/L (ref 15–37)
BILIRUB SERPL-MCNC: 0.5 MG/DL (ref 0–1)
BUN SERPL-MCNC: 20 MG/DL (ref 6–23)
CALCIUM SERPL-MCNC: 8.7 MG/DL (ref 8.3–10.6)
CHLORIDE BLD-SCNC: 106 MMOL/L (ref 99–110)
CHOLEST SERPL-MCNC: 191 MG/DL
CO2: 24 MMOL/L (ref 21–32)
CREAT SERPL-MCNC: 1 MG/DL (ref 0.6–1.1)
GFR, ESTIMATED: 57 ML/MIN/1.73M2
GLUCOSE SERPL-MCNC: 88 MG/DL (ref 70–99)
HCT VFR BLD CALC: 36.7 % (ref 37–47)
HDLC SERPL-MCNC: 64 MG/DL
HEMOGLOBIN: 12 GM/DL (ref 12.5–16)
LDLC SERPL CALC-MCNC: 112 MG/DL
MCH RBC QN AUTO: 31.4 PG (ref 27–31)
MCHC RBC AUTO-ENTMCNC: 32.7 % (ref 32–36)
MCV RBC AUTO: 96.1 FL (ref 78–100)
PDW BLD-RTO: 12.4 % (ref 11.7–14.9)
PLATELET # BLD: 198 K/CU MM (ref 140–440)
PMV BLD AUTO: 10.9 FL (ref 7.5–11.1)
POTASSIUM SERPL-SCNC: 4.2 MMOL/L (ref 3.5–5.1)
RBC # BLD: 3.82 M/CU MM (ref 4.2–5.4)
SODIUM BLD-SCNC: 141 MMOL/L (ref 135–145)
TOTAL PROTEIN: 6.3 GM/DL (ref 6.4–8.2)
TRIGL SERPL-MCNC: 77 MG/DL
TSH SERPL DL<=0.005 MIU/L-ACNC: 1.18 UIU/ML (ref 0.27–4.2)
WBC # BLD: 3.9 K/CU MM (ref 4–10.5)

## 2024-06-13 PROCEDURE — 80061 LIPID PANEL: CPT

## 2024-06-13 PROCEDURE — 84443 ASSAY THYROID STIM HORMONE: CPT

## 2024-06-13 PROCEDURE — 36415 COLL VENOUS BLD VENIPUNCTURE: CPT

## 2024-06-13 PROCEDURE — 82306 VITAMIN D 25 HYDROXY: CPT

## 2024-06-13 PROCEDURE — 80053 COMPREHEN METABOLIC PANEL: CPT

## 2024-06-13 PROCEDURE — 85027 COMPLETE CBC AUTOMATED: CPT

## 2024-06-14 DIAGNOSIS — E55.9 VITAMIN D DEFICIENCY: ICD-10-CM

## 2024-06-14 DIAGNOSIS — D64.9 ANEMIA, UNSPECIFIED TYPE: Primary | ICD-10-CM

## 2024-06-14 NOTE — RESULT ENCOUNTER NOTE
Please call pt, lab ok incl chol level, also thyroid fxn and vit D.  She's very sl anemic, we'll monitor for now but also pls add b12, iron, ferritin and tibc, if no deficiencies rec recheck cbc in 6-8 weeks to f/u, dx anemia

## 2024-07-18 ENCOUNTER — OFFICE VISIT (OUTPATIENT)
Dept: INTERNAL MEDICINE CLINIC | Age: 79
End: 2024-07-18

## 2024-07-18 VITALS
WEIGHT: 121 LBS | OXYGEN SATURATION: 98 % | BODY MASS INDEX: 23.75 KG/M2 | HEIGHT: 60 IN | HEART RATE: 96 BPM | SYSTOLIC BLOOD PRESSURE: 134 MMHG | DIASTOLIC BLOOD PRESSURE: 76 MMHG | RESPIRATION RATE: 20 BRPM

## 2024-07-18 DIAGNOSIS — R39.9 UTI SYMPTOMS: Primary | ICD-10-CM

## 2024-07-18 DIAGNOSIS — D64.9 ANEMIA, UNSPECIFIED TYPE: Primary | ICD-10-CM

## 2024-07-18 DIAGNOSIS — D64.9 ANEMIA, UNSPECIFIED TYPE: ICD-10-CM

## 2024-07-18 LAB
FERRITIN SERPL IA-MCNC: 399.1 NG/ML (ref 15–150)
FOLATE SERPL-MCNC: 10.08 NG/ML (ref 4.78–24.2)
IRON SATN MFR SERPL: 35 % (ref 15–50)
IRON SERPL-MCNC: 97 UG/DL (ref 37–145)
TIBC SERPL-MCNC: 281 UG/DL (ref 260–445)
VIT B12 SERPL-MCNC: 293 PG/ML (ref 211–911)

## 2024-07-18 PROCEDURE — 36415 COLL VENOUS BLD VENIPUNCTURE: CPT | Performed by: INTERNAL MEDICINE

## 2024-07-18 ASSESSMENT — ENCOUNTER SYMPTOMS
CHEST TIGHTNESS: 0
ABDOMINAL PAIN: 0
SHORTNESS OF BREATH: 0
DIARRHEA: 0
VOMITING: 0
SINUS PRESSURE: 0
COLOR CHANGE: 0
COUGH: 0
SINUS PAIN: 0
NAUSEA: 0
APNEA: 0

## 2024-07-18 NOTE — PROGRESS NOTES
tenderness.   Musculoskeletal:         General: Normal range of motion.      Cervical back: Normal range of motion and neck supple. No edema or erythema. No muscular tenderness.   Skin:     General: Skin is warm and dry.      Capillary Refill: Capillary refill takes less than 2 seconds.   Neurological:      Mental Status: She is alert and oriented to person, place, and time.      Coordination: Coordination normal.   Psychiatric:         Thought Content: Thought content normal.         ASSESSMENT:    1. UTI symptoms        PLAN:    Saskia was seen today for urinary tract infection.    Diagnoses and all orders for this visit:    UTI symptoms- pt unable to provide urine specimen in office; fortunately, symptoms seems mild and improving. Pt comfortable awaiting urine results prior to starting possible abx; will advise further once UA/culture resulted.   -     Cancel: POCT Urinalysis no Micro  -     Urinalysis with Microscopic ([place] ONLY); Future  -     Culture, Urine; Future        RX Monitoring Attestation Periodic Controlled Substance Monitoring   5/19/2017   1:45 PM The Prescription Monitoring Report for this patient was reviewed today. Possible medication side effects, risk of tolerance and/or dependence, and alternative treatments discussed;No signs of potential drug abuse or diversion identified.       No follow-ups on file.    Steven note this reports has been produced speech recognition software and may contain errors related to that system including errors in grammar, punctuation, and spelling, as well as words and phrases that may be appropriate. If there are any questions or concerns please feel free to contact the dictating provider for clarification.

## 2024-07-19 ENCOUNTER — TELEPHONE (OUTPATIENT)
Dept: INTERNAL MEDICINE CLINIC | Age: 79
End: 2024-07-19

## 2024-07-19 DIAGNOSIS — D64.9 ANEMIA, UNSPECIFIED TYPE: Primary | ICD-10-CM

## 2024-07-19 DIAGNOSIS — R39.9 UTI SYMPTOMS: ICD-10-CM

## 2024-07-19 LAB
BACTERIA URNS QL MICRO: ABNORMAL /HPF
BILIRUB UR QL STRIP.AUTO: NEGATIVE
CLARITY UR: CLEAR
COLOR UR: YELLOW
EPI CELLS #/AREA URNS AUTO: 2 /HPF (ref 0–5)
GLUCOSE UR STRIP.AUTO-MCNC: NEGATIVE MG/DL
HGB UR QL STRIP.AUTO: NEGATIVE
HYALINE CASTS #/AREA URNS AUTO: 0 /LPF (ref 0–8)
KETONES UR STRIP.AUTO-MCNC: NEGATIVE MG/DL
LEUKOCYTE ESTERASE UR QL STRIP.AUTO: ABNORMAL
NITRITE UR QL STRIP.AUTO: NEGATIVE
PH UR STRIP.AUTO: 6.5 [PH] (ref 5–8)
PROT UR STRIP.AUTO-MCNC: NEGATIVE MG/DL
RBC CLUMPS #/AREA URNS AUTO: 1 /HPF (ref 0–4)
SP GR UR STRIP.AUTO: 1.02 (ref 1–1.03)
UA DIPSTICK W REFLEX MICRO PNL UR: YES
URN SPEC COLLECT METH UR: ABNORMAL
UROBILINOGEN UR STRIP-ACNC: 0.2 E.U./DL
WBC #/AREA URNS AUTO: 1 /HPF (ref 0–5)

## 2024-07-19 NOTE — TELEPHONE ENCOUNTER
----- Message from Watson Dickey MD sent at 7/19/2024  7:23 AM EDT -----  Please call pt, HER IRON LEVEL IS NORMAL AND B12 IS LOW END OF NORMAL, I DO REC ANY GENERIC MULTIVITAMIN WHICH HAS B12 AND IRON, TRYING THIS 3X/WEEK.  THEN LET'S RECHECK CBC, IRON, FERRITIN, TIBC AND B12 LEVEL IN 4-6 WEEKS TO SEE IF LEVELS IMPROVE, DX ANEMIA.  THX

## 2024-07-19 NOTE — RESULT ENCOUNTER NOTE
Please call pt, HER IRON LEVEL IS NORMAL AND B12 IS LOW END OF NORMAL, I DO REC ANY GENERIC MULTIVITAMIN WHICH HAS B12 AND IRON, TRYING THIS 3X/WEEK.  THEN LET'S RECHECK CBC, IRON, FERRITIN, TIBC AND B12 LEVEL IN 4-6 WEEKS TO SEE IF LEVELS IMPROVE, DX ANEMIA.  THX

## 2024-07-21 LAB — BACTERIA UR CULT: NORMAL

## 2024-07-22 DIAGNOSIS — I10 PRIMARY HYPERTENSION: ICD-10-CM

## 2024-07-22 RX ORDER — HYDROCHLOROTHIAZIDE 12.5 MG/1
12.5 CAPSULE ORAL EVERY MORNING
Qty: 90 CAPSULE | Refills: 3 | OUTPATIENT
Start: 2024-07-22

## 2024-10-09 ENCOUNTER — TELEPHONE (OUTPATIENT)
Dept: INTERNAL MEDICINE CLINIC | Age: 79
End: 2024-10-09

## 2024-10-09 ENCOUNTER — HOSPITAL ENCOUNTER (OUTPATIENT)
Age: 79
Discharge: HOME | End: 2024-10-09
Payer: MEDICARE

## 2024-10-09 ENCOUNTER — NURSE ONLY (OUTPATIENT)
Dept: INTERNAL MEDICINE CLINIC | Age: 79
End: 2024-10-09
Payer: MEDICARE

## 2024-10-09 ENCOUNTER — OFFICE VISIT (OUTPATIENT)
Dept: INTERNAL MEDICINE CLINIC | Age: 79
End: 2024-10-09

## 2024-10-09 ENCOUNTER — HOSPITAL ENCOUNTER (OUTPATIENT)
Dept: GENERAL RADIOLOGY | Age: 79
Discharge: HOME | End: 2024-10-09
Payer: MEDICARE

## 2024-10-09 VITALS
OXYGEN SATURATION: 95 % | WEIGHT: 122.6 LBS | DIASTOLIC BLOOD PRESSURE: 62 MMHG | BODY MASS INDEX: 24.07 KG/M2 | SYSTOLIC BLOOD PRESSURE: 120 MMHG | HEART RATE: 89 BPM | HEIGHT: 60 IN

## 2024-10-09 DIAGNOSIS — M54.2 NECK PAIN: ICD-10-CM

## 2024-10-09 DIAGNOSIS — M47.812 ARTHRITIS OF NECK: Primary | ICD-10-CM

## 2024-10-09 DIAGNOSIS — Z23 NEED FOR IMMUNIZATION AGAINST INFLUENZA: ICD-10-CM

## 2024-10-09 DIAGNOSIS — H61.22 LEFT EAR IMPACTED CERUMEN: Primary | ICD-10-CM

## 2024-10-09 DIAGNOSIS — Z23 NEED FOR INFLUENZA VACCINATION: Primary | ICD-10-CM

## 2024-10-09 PROCEDURE — G0008 ADMIN INFLUENZA VIRUS VAC: HCPCS | Performed by: INTERNAL MEDICINE

## 2024-10-09 PROCEDURE — 72040 X-RAY EXAM NECK SPINE 2-3 VW: CPT

## 2024-10-09 PROCEDURE — 90653 IIV ADJUVANT VACCINE IM: CPT | Performed by: INTERNAL MEDICINE

## 2024-10-09 NOTE — PROGRESS NOTES
Saskia JACK Shen  1945  10/09/24    SUBJECTIVE:   Just got hearing aids bilat, [employer]- typically 5K but there was only 1500$.    The exam showed she has some earwax in place.    Due for flu shot    Neck aches w walking longer distances.  No exertion sx cp or sob.    OBJECTIVE:    /62 (Site: Left Upper Arm, Position: Sitting, Cuff Size: Medium Adult)   Pulse 89   Ht 1.524 m (5')   Wt 55.6 kg (122 lb 9.6 oz)   LMP  (LMP Unknown)   SpO2 95%   BMI 23.94 kg/m²     Physical Exam  Constitutional:       Appearance: Normal appearance.   HENT:      Right Ear: Tympanic membrane, ear canal and external ear normal.      Left Ear: There is impacted cerumen.   Musculoskeletal:      Cervical back: Rigidity present.   Neurological:      Mental Status: She is alert.         ASSESSMENT:    1. Left ear impacted cerumen    2. Need for immunization against influenza    3. Neck pain        PLAN:  I PERFORMED L EARM curette disimpation.  Repeat exam reveals normal appearance of Tm and external canal.  Symptoms of ear fullness and diminished hearing resolved.  Patient tolerated procedure well.    Flu shot today.    Neck stiffness and pain is likely from arthritis, check x-ray.  He may continue Tylenol and for more severe symptoms periodic limited dosing as needed NSAIDs.    Saskia was seen today for other.    Diagnoses and all orders for this visit:    Left ear impacted cerumen    Need for immunization against influenza    Neck pain  -     XR CERVICAL SPINE (2-3 VIEWS); Future

## 2024-10-09 NOTE — TELEPHONE ENCOUNTER
----- Message from Dr. 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  If she is willing, may have some benefit w trying some physical therapy.  If has no preference we can refer to [facility].  If she wants to hold off we can monitor for now

## 2024-10-09 NOTE — RESULT ENCOUNTER NOTE
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  If she is willing, may have some benefit w trying some physical therapy.  If has no preference we can refer to [facility].  If she wants to hold off we can monitor for now

## 2024-10-25 ENCOUNTER — HOSPITAL ENCOUNTER (OUTPATIENT)
Dept: PHYSICAL THERAPY | Age: 79
Setting detail: THERAPIES SERIES
Discharge: HOME | End: 2024-10-25
Payer: MEDICARE

## 2024-10-25 PROCEDURE — 97110 THERAPEUTIC EXERCISES: CPT

## 2024-10-25 PROCEDURE — 97161 PT EVAL LOW COMPLEX 20 MIN: CPT

## 2024-10-25 ASSESSMENT — PAIN DESCRIPTION - LOCATION: LOCATION: NECK

## 2024-10-25 ASSESSMENT — PAIN SCALES - GENERAL: PAINLEVEL_OUTOF10: 4

## 2024-10-25 ASSESSMENT — PAIN DESCRIPTION - ORIENTATION: ORIENTATION: RIGHT;LEFT

## 2024-10-25 NOTE — PROGRESS NOTES
recheck AS NEEDED    HYSTERECTOMY (CERVIX STATUS UNKNOWN)      JOINT REPLACEMENT      bilateral knees    SHOULDER ARTHROSCOPY Left 06/03/2014    Dr Kenny Kennedy-  repair    SKIN CANCER EXCISION  2010    Face Dr. Brenda BARKSDALE AND BSO (CERVIX REMOVED)  1989    TONSILLECTOMY      TOTAL KNEE ARTHROPLASTY  2010    left Dr Lombardi    TOTAL KNEE ARTHROPLASTY  2001    right Dr. Lombardi    UPPER GASTROINTESTINAL ENDOSCOPY N/A 12/05/2018    EGD DIAGNOSTIC ONLY performed by Lakhwinder Garcia MD at [facility] OR       Medications:   Current Outpatient Medications:     escitalopram (LEXAPRO) 10 MG tablet, Take 1 tablet by mouth every morning, Disp: 90 tablet, Rfl: 1    hydroCHLOROthiazide 12.5 MG capsule, Take 1 capsule by mouth daily as needed (take in am only if systolic bp is >160 for more than 3-4 hours), Disp: 90 capsule, Rfl: 0    vitamin D3 (CHOLECALCIFEROL) 25 MCG (1000 UT) TABS tablet, Take 1 tablet by mouth daily, Disp: 90 tablet, Rfl: 3    alendronate (FOSAMAX) 70 MG tablet, Take 1 tablet by mouth every 7 days Take with a full glass of water upon arising for the day. Consume on an empty stomach at least 30 minutes before the first food, beverage, or medication. Stay upright (do not lie down) for at least 30 minutes. Do not crush or break., Disp: 12 tablet, Rfl: 3    fluticasone (FLONASE) 50 MCG/ACT nasal spray, 2 sprays by Each Nostril route daily, Disp: 16 g, Rfl: 5    traZODone (DESYREL) 50 MG tablet, Take 1 tablet by mouth nightly as needed for Sleep, Disp: 90 tablet, Rfl: 1    tolterodine (DETROL) 1 MG tablet, Take 1 tablet by mouth 2 times daily, Disp: 180 tablet, Rfl: 1    gabapentin (NEURONTIN) 100 MG capsule, Take 1 capsule by mouth 2 times daily as needed (BACK PAIN) for up to 30 days., Disp: 60 capsule, Rfl: 1    vitamin B-12 (CYANOCOBALAMIN) 500 MCG tablet, Take 1 tablet by mouth daily, Disp: 30 tablet, Rfl: 3    Esomeprazole Magnesium (NEXIUM PO), Take by mouth, Disp: , Rfl:   Allergies: Celebrex

## 2024-10-25 NOTE — FLOWSHEET NOTE
Outpatient Physical Therapy  [place]           [x] Phone: [phone]   Fax: [phone]  [place]           [] Phone: [phone]   Fax: [phone]        Physical Therapy Daily Treatment Note  Date:  10/25/2024    Patient Name:  Saskia Shen    :  1945  MRN: [id number]  Restrictions/Precautions: Restrictions/Precautions: General Precautions        Diagnosis:   Arthritis of neck [M47.812]    Date of Injury/Surgery:   Treatment Diagnosis:  decreased cervical ROM  Insurance/Certification information: medicare  Referring Physician:  Watson Dickey MD     PCP: Watson Dickey MD  Next Doctor Visit:    Plan of care signed (Y/N):  sent 10/25  Outcome Measure: NDI:   Visit# / total visits:  1 /10  Pain level: 4/10   Goals:     Patient goals: reduce pain  Short term goals  Time Frame for Short term goals: refer to [facility]                 Long Term Goals  Time Frame for Long Term Goals: 10 visits  Pt will report overall improvement in condition by 50% or more  pt will improve NDI to 4/50 or less to show MDC and subjective improvement for reduced pain with ADLS/IADLS  pt will improve cervical R rotation AROM to 52 deg for improved looking over shoulder while driving  pt will report being able to walk for 10 minutes with minimal to no increase in pain for improved QOL with activities out of the home         Summary of Evaluation:  Assessment: Pt is a 77 yo R handed female that presents to therapy with complaints of neck pain lasting about 8 months. Per cervical XRAY: Moderate multilevel, multifactorial cervical spondylosis with degenerative grade     1 anterolisthesis of C3 on C4      She likes to golf and hasnt for a few months due to being busy. She is able to complete household tasks independently. Pt gets increased pain with walking long distances, especially on uneven ground. Pt demo impaired cervical ROM, BUE strength, walking tolerance, activity tolerance, functional mobility and

## 2024-10-25 NOTE — PLAN OF CARE
Outpatient Physical Therapy           [place]           [x] Phone: [phone]   Fax: [phone]  [place]           [] Phone: [phone]   Fax: [phone]     To: Watson Dickey MD     From:Demetra Avelar, PT, DPT, Cert. DN        Patient: Saskia Shen       : 1945  Diagnosis: Arthritis of neck [M47.812]    Treatment Diagnosis: decreased cervical ROM  Date: 10/25/2024    Physical Therapy Certification/Re-Certification Form  Dear Dr. Dickey,   The following patient has been evaluated for physical therapy services and for therapy to continue, insurance requires physician review of the treatment plan initially and every 90 days. Please review the attached evaluation and/or summary of the patient's plan of care, and verify that you agree therapy should continue by signing the attached document and sending it back to our office.    Assessment:    Assessment: Pt is a 79 yo R handed female that presents to therapy with complaints of neck pain lasting about 8 months. Per cervical XRAY: Moderate multilevel, multifactorial cervical spondylosis with degenerative grade     1 anterolisthesis of C3 on C4      She likes to golf and hasnt for a few months due to being busy. She is able to complete household tasks independently. Pt gets increased pain with walking long distances, especially on uneven ground. Pt demo impaired cervical ROM, BUE strength, walking tolerance, activity tolerance, functional mobility and increased pain. Pt would benefit from continued skilled physical therapy to address impairments and limitations, progress toward goal completion, promote independence with ADLs, and prevent further injury.    Patient agrees with established plan of care and assisted in the development of their short term and long term goals. Patient had no adverse reaction with initial treatment and there are no barriers to learning.  Learning preferences include demonstration, practice, and handouts.

## 2024-10-28 ENCOUNTER — HOSPITAL ENCOUNTER (OUTPATIENT)
Dept: PHYSICAL THERAPY | Age: 79
Setting detail: THERAPIES SERIES
Discharge: HOME | End: 2024-10-28
Payer: MEDICARE

## 2024-10-28 PROCEDURE — 97140 MANUAL THERAPY 1/> REGIONS: CPT

## 2024-10-28 PROCEDURE — 97110 THERAPEUTIC EXERCISES: CPT

## 2024-10-28 NOTE — FLOWSHEET NOTE
increased pain. Pt would benefit from continued skilled physical therapy to address impairments and limitations, progress toward goal completion, promote independence with ADLs, and prevent further injury.        Subjective:   Pt states her pain is 6/10 first thing in the morning but then decrease once she has been up for awhile. Neck did feel a little better with walking at [employer] game on Sat.         Any changes in Ambulatory Summary Sheet?  None        Objective:      Steven UT and Levator tightness  TTP bilat UT's    Exercises: (No more than 4 columns)   Exercise/Equipment 10/25/24 #1  Date 10/28/24 #2 Date           WARM UP      UBE       2'/2'          TABLE      Supine chin tucks 10x5\"   Cues for no ext  With towel roll 5\" x10    Scap squeezes 10x3\"   10x3\"     UT/LS stretches Bilat UT  X30\" ea  30\" x ea bilat    Cervical rotation SNAGS    10\" x3 ea R/L             STANDING      Horz abd   Seated YTB 1x10                                             PROPRIOCEPTION                                    MODALITIES                      Other Therapeutic Activities/Education:  HEP and importance of completion, POC and goals, anatomy and physiology related to condition        Home Exercise Program:  Issued, practiced and pt demo ability to perform on eval date  10/25: Access Code: [email or web address]   URL: [email or web address]   Date: 10/25/2024   Prepared by: Marilyn Avelar, PT, DPT, Cert. DN       Exercises   - Seated Cervical Sidebending Stretch  - 2 x daily - 7 x weekly - 2 sets - 20-30 hold   - Supine Chin Tuck  - 2 x daily - 7 x weekly - 2 sets - 10 reps - 5 hold   - Seated Scapular Retraction  - 2 x daily - 7 x weekly - 2 sets - 10 reps - 3 hold     10/28/24 Reviewed HEP and added seated horizontal abd with YTB. Provided with band and handout.    Manual Treatments:  Cervical distraction, SOR, bilat UT and Levator stretching and STM to bilat UT's      Modalities:  none      Communication with other providers:  POC sent

## 2024-11-05 ENCOUNTER — HOSPITAL ENCOUNTER (OUTPATIENT)
Dept: PHYSICAL THERAPY | Age: 79
Setting detail: THERAPIES SERIES
Discharge: HOME | End: 2024-11-05
Payer: MEDICARE

## 2024-11-05 PROCEDURE — 97140 MANUAL THERAPY 1/> REGIONS: CPT

## 2024-11-05 PROCEDURE — 97110 THERAPEUTIC EXERCISES: CPT

## 2024-11-05 NOTE — FLOWSHEET NOTE
Outpatient Physical Therapy  [place]           [x] Phone: [phone]   Fax: [phone]  [place]           [] Phone: [phone]   Fax: [phone]        Physical Therapy Daily Treatment Note  Date:  2024    Patient Name:  Saskia Shen    :  1945  MRN: [id number]  Restrictions/Precautions: Restrictions/Precautions: General Precautions        Diagnosis:   Arthritis of neck [M47.812]    Date of Injury/Surgery:   Treatment Diagnosis:  decreased cervical ROM  Insurance/Certification information: medicare  Referring Physician:  Watson Dickey MD     PCP: Watson Dickey MD  Next Doctor Visit:    Plan of care signed (Y/N):  sent 10/25  Outcome Measure: NDI:   Visit# / total visits:  3/10  Pain level: 2-3/10   Goals:     Patient goals: reduce pain  Short term goals  Time Frame for Short term goals: refer to [facility]                 Long Term Goals  Time Frame for Long Term Goals: 10 visits  Pt will report overall improvement in condition by 50% or more  pt will improve NDI to 4/50 or less to show MDC and subjective improvement for reduced pain with ADLS/IADLS  pt will improve cervical R rotation AROM to 52 deg for improved looking over shoulder while driving  pt will report being able to walk for 10 minutes with minimal to no increase in pain for improved QOL with activities out of the home         Summary of Evaluation:  Assessment: Pt is a 77 yo R handed female that presents to therapy with complaints of neck pain lasting about 8 months. Per cervical XRAY: Moderate multilevel, multifactorial cervical spondylosis with degenerative grade     1 anterolisthesis of C3 on C4      She likes to golf and hasnt for a few months due to being busy. She is able to complete household tasks independently. Pt gets increased pain with walking long distances, especially on uneven ground. Pt demo impaired cervical ROM, BUE strength, walking tolerance, activity tolerance, functional mobility and

## 2024-11-11 ENCOUNTER — HOSPITAL ENCOUNTER (OUTPATIENT)
Dept: PHYSICAL THERAPY | Age: 79
Setting detail: THERAPIES SERIES
Discharge: HOME | End: 2024-11-11
Payer: MEDICARE

## 2024-11-11 PROCEDURE — 97110 THERAPEUTIC EXERCISES: CPT

## 2024-11-11 PROCEDURE — 97140 MANUAL THERAPY 1/> REGIONS: CPT

## 2024-11-13 ENCOUNTER — HOSPITAL ENCOUNTER (OUTPATIENT)
Age: 79
Discharge: HOME | End: 2024-11-13
Payer: MEDICARE

## 2024-11-13 ENCOUNTER — OFFICE VISIT (OUTPATIENT)
Dept: INTERNAL MEDICINE CLINIC | Age: 79
End: 2024-11-13

## 2024-11-13 VITALS
SYSTOLIC BLOOD PRESSURE: 108 MMHG | BODY MASS INDEX: 23.45 KG/M2 | RESPIRATION RATE: 16 BRPM | HEART RATE: 77 BPM | HEIGHT: 61 IN | DIASTOLIC BLOOD PRESSURE: 68 MMHG | OXYGEN SATURATION: 96 %

## 2024-11-13 DIAGNOSIS — E53.8 B12 DEFICIENCY: ICD-10-CM

## 2024-11-13 DIAGNOSIS — G58.8 OTHER MONONEUROPATHY: Primary | ICD-10-CM

## 2024-11-13 DIAGNOSIS — R73.9 HYPERGLYCEMIA: ICD-10-CM

## 2024-11-13 LAB
EST. AVERAGE GLUCOSE BLD GHB EST-MCNC: 92 MG/DL
HBA1C MFR BLD: 4.8 % (ref 4.2–6.3)
VIT B12 SERPL-MCNC: 319 PG/ML (ref 211–911)

## 2024-11-13 PROCEDURE — 82607 VITAMIN B-12: CPT

## 2024-11-13 PROCEDURE — 36415 COLL VENOUS BLD VENIPUNCTURE: CPT

## 2024-11-13 PROCEDURE — 83036 HEMOGLOBIN GLYCOSYLATED A1C: CPT

## 2024-11-13 RX ORDER — GABAPENTIN 100 MG/1
100 CAPSULE ORAL 2 TIMES DAILY
Qty: 60 CAPSULE | Refills: 1 | Status: SHIPPED | OUTPATIENT
Start: 2024-11-13 | End: 2025-01-12

## 2024-11-13 NOTE — PROGRESS NOTES
Neurological:      Sensory: Sensory deficit (DECR BILAT FOOT VIBRATORY SENSE BUT INTACT IN HANDS.  HAS INTACT FOOT SENSATION TO MONOFILAMENT) present.         ASSESSMENT:    1. Other mononeuropathy    2. B12 deficiency    3. Hyperglycemia        PLAN:  Assessment & Plan  1. Carpal Tunnel Syndrome.  She reports burning sensation in her thumbs, which has been occurring more frequently over the past two weeks.  patient history suggestS POSSIBLE carpal tunnel syndrome. She is advised to perform specific exercises and stretches to alleviate symptoms. Information on carpal tunnel syndrome has been provided.    2. Peripheral Neuropathy.  She has been experiencing tingling in her feet for the past two to three days. Her B12 levels have been borderline low, and her blood sugar levels have been slightly elevated in the past. A nerve study will be conducted to evaluate her feet, legs, and hands. She is advised to continue her B12 supplement every other day. Gabapentin has been prescribed, to be taken up to twice daily. If drowsiness occurs, she should take two tablets in the evening. This medication should help with the burning discomfort.    3. Vitamin B12 Deficiency.- ALSO POTENTIAL CAUSE FOR NEUROPATHY  Her B12 levels have been borderline low. She is advised to continue her B12 supplement every other day. Blood work will be done to check her current B12 levels.    4. Prediabetes.  Her blood sugar levels have been slightly elevated in the past. Blood work will be done to check for any worsening of prediabetes.    Follow-up  Return in 1 month for follow-up.    Saskia was seen today for tingling.    Diagnoses and all orders for this visit:    Other mononeuropathy  -     Mahesh Monzon MD, Physical Medicine, [place]  -     gabapentin (NEURONTIN) 100 MG capsule; Take 1 capsule by mouth 2 times daily for 60 days.    B12 deficiency  -     Vitamin B12; Future    Hyperglycemia  -     Hemoglobin A1C; Future        The

## 2024-11-15 ENCOUNTER — HOSPITAL ENCOUNTER (OUTPATIENT)
Dept: PHYSICAL THERAPY | Age: 79
Setting detail: THERAPIES SERIES
Discharge: HOME | End: 2024-11-15
Payer: MEDICARE

## 2024-11-15 PROCEDURE — 97140 MANUAL THERAPY 1/> REGIONS: CPT

## 2024-11-15 PROCEDURE — 97110 THERAPEUTIC EXERCISES: CPT

## 2024-11-15 NOTE — FLOWSHEET NOTE
Outpatient Physical Therapy  [place]           [x] Phone: [phone]   Fax: [phone]  [place]           [] Phone: [phone]   Fax: [phone]        Physical Therapy Daily Treatment Note  Date:  11/15/2024    Patient Name:  Saskia Shen    :  1945  MRN: [id number]  Restrictions/Precautions: Restrictions/Precautions: General Precautions        Diagnosis:   Arthritis of neck [M47.812]    Date of Injury/Surgery:   Treatment Diagnosis:  decreased cervical ROM  Insurance/Certification information: medicare  Referring Physician:  Watson Dickey MD     PCP: Watson Dickey MD  Next Doctor Visit:    Plan of care signed (Y/N):  yes  Outcome Measure: NDI:   Visit# / total visits:  5/10  Pain level: 2-3/10   Goals:     Patient goals: reduce pain  Short term goals  Time Frame for Short term goals: refer to [facility]                 Long Term Goals  Time Frame for Long Term Goals: 10 visits  Pt will report overall improvement in condition by 50% or more  pt will improve NDI to 4/50 or less to show MDC and subjective improvement for reduced pain with ADLS/IADLS  pt will improve cervical R rotation AROM to 52 deg for improved looking over shoulder while driving  pt will report being able to walk for 10 minutes with minimal to no increase in pain for improved QOL with activities out of the home         Summary of Evaluation:  Assessment: Pt is a 77 yo R handed female that presents to therapy with complaints of neck pain lasting about 8 months. Per cervical XRAY: Moderate multilevel, multifactorial cervical spondylosis with degenerative grade     1 anterolisthesis of C3 on C4      She likes to golf and hasnt for a few months due to being busy. She is able to complete household tasks independently. Pt gets increased pain with walking long distances, especially on uneven ground. Pt demo impaired cervical ROM, BUE strength, walking tolerance, activity tolerance, functional mobility and

## 2024-11-18 ENCOUNTER — HOSPITAL ENCOUNTER (OUTPATIENT)
Dept: PHYSICAL THERAPY | Age: 79
Discharge: HOME | End: 2024-11-18

## 2024-11-18 NOTE — FLOWSHEET NOTE
Physical Therapy  Cancellation/No-show Note  Patient Name:  Saskia Shen  :  1945   Date:  2024  Cancelled visits to date: 1  No-shows to date: 0    For today's appointment patient:  [x]  Cancelled  []  Rescheduled appointment  []  No-show     Reason given by patient:  [x]  Patient ill  []  Conflicting appointment  []  No transportation    []  Conflict with work  []  No reason given  []  Other:     Comments:      Electronically signed by:  Sandee Parry PTA 2024, 9:47 AM

## 2024-11-22 ENCOUNTER — HOSPITAL ENCOUNTER (OUTPATIENT)
Dept: PHYSICAL THERAPY | Age: 79
Setting detail: THERAPIES SERIES
Discharge: HOME | End: 2024-11-22
Payer: MEDICARE

## 2024-11-22 NOTE — FLOWSHEET NOTE
Physical Therapy  Cancellation/No-show Note  Patient Name:  Saskia Shen  :  1945   Date:  2024  Cancelled visits to date: 2  No-shows to date: 0    For today's appointment patient:  [x]  Cancelled  []  Rescheduled appointment  []  No-show     Reason given by patient:  [x]  Patient ill  []  Conflicting appointment  []  No transportation    []  Conflict with work  []  No reason given  []  Other:     Comments:      Electronically signed by:  Demetra Avelar PT,DPT, Cert. DN    2024, 8:34 AM

## 2024-11-25 ENCOUNTER — HOSPITAL ENCOUNTER (OUTPATIENT)
Dept: PHYSICAL THERAPY | Age: 79
Setting detail: THERAPIES SERIES
Discharge: HOME | End: 2024-11-25
Payer: MEDICARE

## 2024-11-25 PROCEDURE — 97140 MANUAL THERAPY 1/> REGIONS: CPT

## 2024-11-25 PROCEDURE — 97110 THERAPEUTIC EXERCISES: CPT

## 2024-11-25 NOTE — DISCHARGE SUMMARY
Outpatient Physical Therapy           [place]           [x] Phone: [phone]   Fax: [phone]  [place]           [] Phone: [phone]   Fax: [phone]      To: Watson Dickey MD     From: Demetra Avelar, PT, DPT, Cert. DN       Patient: Saskia Shen                    : 1945  Diagnosis:  Arthritis of neck [M47.812]        Treatment Diagnosis:   decreased cervical ROM     Date: 2024  []  Progress Note                [x]  Discharge Note    Evaluation Date:  10/25/24   Total Visits to date:  6  Cancels/No-shows to date:  2    Subjective:    pt reports that she is feeling pretty good this morning. She would like to make today her last visit, she feels that she is doing good. She notes that her pain has reduced since starting therapy. She feels that she has improved by 90% overall since starting therapy. She notes that she walked about 30 minutes on Saturday without having an increase in pain, before therapy her pain would start right away when she started walking. NDI: 50     Objective/Significant Findings At Last Visit/Comments:    Cervical R rotation AROM: 48 deg   Improved trigger points B UT     Assessment:      Pt has shown good progress since therapy start regarding improved ROM, strength, activity tolerance, functional mobility and pain. Pt has met most of her goals at this time and is no longer having any major limitations outside of therapy.  PT educated pt on continuation of HEP after discharge for max benefits and reduced risk for future decline. Pt discharged this date     Goal Status:  [x] Achieved [x] Partially Achieved  [x] Not Achieved   Patient goals: reduce pain met   Short term goals  Time Frame for Short term goals: refer to [facility]        Long Term Goals  Time Frame for Long Term Goals: 10 visits  Pt will report overall improvement in condition by 50% or more met   pt will improve NDI to 4/50 or less to show MDC and subjective improvement for reduced pain

## 2024-11-25 NOTE — FLOWSHEET NOTE
Outpatient Physical Therapy  [place]           [x] Phone: [phone]   Fax: [phone]  [place]           [] Phone: [phone]   Fax: [phone]        Physical Therapy Daily Treatment Note  Date:  2024    Patient Name:  Saskia Shen    :  1945  MRN: [id number]  Restrictions/Precautions: Restrictions/Precautions: General Precautions        Diagnosis:   Arthritis of neck [M47.812]    Date of Injury/Surgery:   Treatment Diagnosis:  decreased cervical ROM  Insurance/Certification information: medicare  Referring Physician:  Watson Dickey MD     PCP: Watson Dickey MD  Next Doctor Visit:    Plan of care signed (Y/N):  yes  Outcome Measure: NDI:   Visit# / total visits:  6/10  Pain level: 2/10   Goals:     Patient goals: reduce pain met   Short term goals  Time Frame for Short term goals: refer to [facility]        Long Term Goals  Time Frame for Long Term Goals: 10 visits  Pt will report overall improvement in condition by 50% or more met   pt will improve NDI to 4/50 or less to show MDC and subjective improvement for reduced pain with ADLS/IADLS almost met   pt will improve cervical R rotation AROM to 52 deg for improved looking over shoulder while driving not met   pt will report being able to walk for 10 minutes with minimal to no increase in pain for improved QOL with activities out of the home met          Summary of Evaluation:  Assessment: Pt is a 77 yo R handed female that presents to therapy with complaints of neck pain lasting about 8 months. Per cervical XRAY: Moderate multilevel, multifactorial cervical spondylosis with degenerative grade     1 anterolisthesis of C3 on C4      She likes to golf and hasnt for a few months due to being busy. She is able to complete household tasks independently. Pt gets increased pain with walking long distances, especially on uneven ground. Pt demo impaired cervical ROM, BUE strength, walking tolerance, activity tolerance,

## 2024-12-30 DIAGNOSIS — F41.9 ANXIETY: ICD-10-CM

## 2024-12-30 RX ORDER — ESCITALOPRAM OXALATE 10 MG/1
10 TABLET ORAL EVERY MORNING
Qty: 90 TABLET | Refills: 1 | Status: SHIPPED | OUTPATIENT
Start: 2024-12-30

## 2025-01-12 NOTE — PROGRESS NOTES
thyromegaly or thyroid nodules, no cervical lymphadenopathy  Pulmonary/Chest: clear to auscultation bilaterally- no wheezes, rales or rhonchi, normal air movement, no respiratory distress  Cardiovascular: normal rate, regular rhythm, normal S1 and S2, no murmurs, rubs, clicks, or gallops, distal pulses intact, no carotid bruits  Abdomen: soft, non-tender, non-distended, normal bowel sounds, no masses or organomegaly  Extremities: no cyanosis, clubbing or edema  Musculoskeletal: normal range of motion, no joint swelling, deformity or tenderness  Neurologic: no cranial nerve deficit, gait, coordination and speech normal            Allergies   Allergen Reactions    Celebrex [Celecoxib]      pruritis    Flagyl [Metronidazole]      Nausea, stomach upset    Lisinopril      cough    Losartan Potassium      cough    Norvasc [Amlodipine Besylate]      Swelling face and legs    Nsaids      ckd    Tramadol Other (See Comments)     Upset stomach     Prior to Visit Medications    Medication Sig Taking? Authorizing Provider   escitalopram (LEXAPRO) 10 MG tablet Take 1 tablet by mouth every morning  Watson Dickey MD   gabapentin (NEURONTIN) 100 MG capsule Take 1 capsule by mouth 2 times daily for 60 days.  Watson Dickey MD   hydroCHLOROthiazide 12.5 MG capsule Take 1 capsule by mouth daily as needed (take in am only if systolic bp is >160 for more than 3-4 hours)  Watson Dickey MD   vitamin D3 (CHOLECALCIFEROL) 25 MCG (1000 UT) TABS tablet Take 1 tablet by mouth daily  Watson Dickey MD   alendronate (FOSAMAX) 70 MG tablet Take 1 tablet by mouth every 7 days Take with a full glass of water upon arising for the day. Consume on an empty stomach at least 30 minutes before the first food, beverage, or medication. Stay upright (do not lie down) for at least 30 minutes. Do not crush or break.  Watson Dickey MD   fluticasone (FLONASE) 50 MCG/ACT nasal spray 2 sprays by Each Nostril route daily

## 2025-01-13 ENCOUNTER — OFFICE VISIT (OUTPATIENT)
Dept: INTERNAL MEDICINE CLINIC | Age: 80
End: 2025-01-13
Payer: MEDICARE

## 2025-01-13 ENCOUNTER — HOSPITAL ENCOUNTER (OUTPATIENT)
Age: 80
Discharge: HOME | End: 2025-01-13
Payer: MEDICARE

## 2025-01-13 VITALS
WEIGHT: 124.8 LBS | SYSTOLIC BLOOD PRESSURE: 138 MMHG | DIASTOLIC BLOOD PRESSURE: 70 MMHG | BODY MASS INDEX: 23.56 KG/M2 | HEART RATE: 77 BPM | OXYGEN SATURATION: 96 % | HEIGHT: 61 IN

## 2025-01-13 DIAGNOSIS — E53.8 B12 DEFICIENCY: ICD-10-CM

## 2025-01-13 DIAGNOSIS — M81.0 AGE-RELATED OSTEOPOROSIS WITHOUT CURRENT PATHOLOGICAL FRACTURE: ICD-10-CM

## 2025-01-13 DIAGNOSIS — I10 PRIMARY HYPERTENSION: ICD-10-CM

## 2025-01-13 DIAGNOSIS — K21.9 GASTROESOPHAGEAL REFLUX DISEASE WITHOUT ESOPHAGITIS: ICD-10-CM

## 2025-01-13 DIAGNOSIS — N39.41 URGE INCONTINENCE OF URINE: ICD-10-CM

## 2025-01-13 DIAGNOSIS — Z00.00 ROUTINE GENERAL MEDICAL EXAMINATION AT A HEALTH CARE FACILITY: Primary | ICD-10-CM

## 2025-01-13 DIAGNOSIS — G58.8 OTHER MONONEUROPATHY: ICD-10-CM

## 2025-01-13 DIAGNOSIS — Z00.00 ENCOUNTER FOR WELL ADULT EXAM WITHOUT ABNORMAL FINDINGS: ICD-10-CM

## 2025-01-13 DIAGNOSIS — E78.2 MIXED HYPERLIPIDEMIA: ICD-10-CM

## 2025-01-13 LAB
ALBUMIN SERPL-MCNC: 4.3 G/DL (ref 3.4–5)
ALBUMIN/GLOB SERPL: 2 {RATIO} (ref 1.1–2.2)
ALP SERPL-CCNC: 107 U/L (ref 40–129)
ALT SERPL-CCNC: 26 U/L (ref 10–40)
ANION GAP SERPL CALCULATED.3IONS-SCNC: 10 MMOL/L (ref 9–17)
AST SERPL-CCNC: 27 U/L (ref 15–37)
BASOPHILS # BLD: 0.04 K/UL
BASOPHILS NFR BLD: 1 % (ref 0–1)
BILIRUB SERPL-MCNC: 0.5 MG/DL (ref 0–1)
BUN SERPL-MCNC: 25 MG/DL (ref 7–20)
CALCIUM SERPL-MCNC: 9.8 MG/DL (ref 8.3–10.6)
CHLORIDE SERPL-SCNC: 104 MMOL/L (ref 99–110)
CHOLEST SERPL-MCNC: 207 MG/DL (ref 125–199)
CO2 SERPL-SCNC: 28 MMOL/L (ref 21–32)
CREAT SERPL-MCNC: 1.1 MG/DL (ref 0.6–1.2)
EOSINOPHIL # BLD: 0.1 K/UL
EOSINOPHILS RELATIVE PERCENT: 2 % (ref 0–3)
ERYTHROCYTE [DISTWIDTH] IN BLOOD BY AUTOMATED COUNT: 12.3 % (ref 11.7–14.9)
GFR, ESTIMATED: 50 ML/MIN/1.73M2
GLUCOSE SERPL-MCNC: 88 MG/DL (ref 74–99)
HCT VFR BLD AUTO: 38.6 % (ref 37–47)
HDLC SERPL-MCNC: 68 MG/DL
HGB BLD-MCNC: 12.8 G/DL (ref 12.5–16)
IMM GRANULOCYTES # BLD AUTO: 0.02 K/UL
IMM GRANULOCYTES NFR BLD: 0 %
LDLC SERPL CALC-MCNC: 112 MG/DL
LYMPHOCYTES NFR BLD: 0.9 K/UL
LYMPHOCYTES RELATIVE PERCENT: 17 % (ref 24–44)
MAGNESIUM SERPL-MCNC: 2 MG/DL (ref 1.8–2.4)
MCH RBC QN AUTO: 31.4 PG (ref 27–31)
MCHC RBC AUTO-ENTMCNC: 33.2 G/DL (ref 32–36)
MCV RBC AUTO: 94.8 FL (ref 78–100)
MONOCYTES NFR BLD: 0.45 K/UL
MONOCYTES NFR BLD: 9 % (ref 0–4)
NEUTROPHILS NFR BLD: 71 % (ref 36–66)
NEUTS SEG NFR BLD: 3.68 K/UL
PLATELET # BLD AUTO: 218 K/UL (ref 140–440)
PMV BLD AUTO: 10.2 FL (ref 7.5–11.1)
POTASSIUM SERPL-SCNC: 4.3 MMOL/L (ref 3.5–5.1)
PROT SERPL-MCNC: 6.6 G/DL (ref 6.4–8.2)
RBC # BLD AUTO: 4.07 M/UL (ref 4.2–5.4)
SODIUM SERPL-SCNC: 142 MMOL/L (ref 136–145)
TRIGL SERPL-MCNC: 132 MG/DL
TSH SERPL DL<=0.05 MIU/L-ACNC: 1.3 UIU/ML (ref 0.27–4.2)
VIT B12 SERPL-MCNC: 520 PG/ML (ref 211–911)
WBC OTHER # BLD: 5.2 K/UL (ref 4–10.5)

## 2025-01-13 PROCEDURE — G0439 PPPS, SUBSEQ VISIT: HCPCS | Performed by: INTERNAL MEDICINE

## 2025-01-13 PROCEDURE — 3075F SYST BP GE 130 - 139MM HG: CPT | Performed by: INTERNAL MEDICINE

## 2025-01-13 PROCEDURE — G8399 PT W/DXA RESULTS DOCUMENT: HCPCS | Performed by: INTERNAL MEDICINE

## 2025-01-13 PROCEDURE — G8420 CALC BMI NORM PARAMETERS: HCPCS | Performed by: INTERNAL MEDICINE

## 2025-01-13 PROCEDURE — 99214 OFFICE O/P EST MOD 30 MIN: CPT | Performed by: INTERNAL MEDICINE

## 2025-01-13 PROCEDURE — 83735 ASSAY OF MAGNESIUM: CPT

## 2025-01-13 PROCEDURE — 80061 LIPID PANEL: CPT

## 2025-01-13 PROCEDURE — 1123F ACP DISCUSS/DSCN MKR DOCD: CPT | Performed by: INTERNAL MEDICINE

## 2025-01-13 PROCEDURE — 1036F TOBACCO NON-USER: CPT | Performed by: INTERNAL MEDICINE

## 2025-01-13 PROCEDURE — 84443 ASSAY THYROID STIM HORMONE: CPT

## 2025-01-13 PROCEDURE — 82607 VITAMIN B-12: CPT

## 2025-01-13 PROCEDURE — 1160F RVW MEDS BY RX/DR IN RCRD: CPT | Performed by: INTERNAL MEDICINE

## 2025-01-13 PROCEDURE — G8427 DOCREV CUR MEDS BY ELIG CLIN: HCPCS | Performed by: INTERNAL MEDICINE

## 2025-01-13 PROCEDURE — 1090F PRES/ABSN URINE INCON ASSESS: CPT | Performed by: INTERNAL MEDICINE

## 2025-01-13 PROCEDURE — 85025 COMPLETE CBC W/AUTO DIFF WBC: CPT

## 2025-01-13 PROCEDURE — 3078F DIAST BP <80 MM HG: CPT | Performed by: INTERNAL MEDICINE

## 2025-01-13 PROCEDURE — 1159F MED LIST DOCD IN RCRD: CPT | Performed by: INTERNAL MEDICINE

## 2025-01-13 PROCEDURE — 0509F URINE INCON PLAN DOCD: CPT | Performed by: INTERNAL MEDICINE

## 2025-01-13 PROCEDURE — 80053 COMPREHEN METABOLIC PANEL: CPT

## 2025-01-13 RX ORDER — HYDROCHLOROTHIAZIDE 12.5 MG/1
12.5 CAPSULE ORAL EVERY MORNING
Qty: 90 CAPSULE | Refills: 3 | Status: SHIPPED | OUTPATIENT
Start: 2025-01-13

## 2025-01-13 RX ORDER — GABAPENTIN 100 MG/1
100 CAPSULE ORAL 2 TIMES DAILY
Qty: 180 CAPSULE | Refills: 1 | Status: SHIPPED | OUTPATIENT
Start: 2025-01-13 | End: 2025-07-12

## 2025-01-13 RX ORDER — TOLTERODINE TARTRATE 1 MG/1
1 TABLET, EXTENDED RELEASE ORAL 2 TIMES DAILY
Qty: 180 TABLET | Refills: 1 | Status: SHIPPED | OUTPATIENT
Start: 2025-01-13

## 2025-01-13 SDOH — ECONOMIC STABILITY: FOOD INSECURITY: WITHIN THE PAST 12 MONTHS, THE FOOD YOU BOUGHT JUST DIDN'T LAST AND YOU DIDN'T HAVE MONEY TO GET MORE.: NEVER TRUE

## 2025-01-13 SDOH — ECONOMIC STABILITY: FOOD INSECURITY: WITHIN THE PAST 12 MONTHS, YOU WORRIED THAT YOUR FOOD WOULD RUN OUT BEFORE YOU GOT MONEY TO BUY MORE.: NEVER TRUE

## 2025-01-13 ASSESSMENT — PATIENT HEALTH QUESTIONNAIRE - PHQ9
SUM OF ALL RESPONSES TO PHQ QUESTIONS 1-9: 0
1. LITTLE INTEREST OR PLEASURE IN DOING THINGS: NOT AT ALL
SUM OF ALL RESPONSES TO PHQ9 QUESTIONS 1 & 2: 0
2. FEELING DOWN, DEPRESSED OR HOPELESS: NOT AT ALL

## 2025-01-13 NOTE — PATIENT INSTRUCTIONS
Well Visit, Over 65: Care Instructions  Well visits can help you stay healthy. Your doctor has checked your overall health and may have suggested ways to take good care of yourself. Your doctor also may have recommended tests. You can help prevent illness with healthy eating, good sleep, vaccinations, regular exercise, and other steps.    Get the tests that you and your doctor decide on. Depending on your age and risks, examples might include hearing tests as well as screening for colon, breast, and lung cancer. Screening helps find diseases before any symptoms appear.   Eat healthy foods. Choose fruits, vegetables, whole grains, lean protein, and low-fat dairy foods. Limit saturated fat, and reduce salt.     Limit alcohol. Men should have no more than 2 drinks a day. Women should have no more than 1. For some people, no alcohol is the best choice.   Exercise. It can help prevent falls. Get at least 30 minutes of exercise on most days of the week. Walking, yoga, and heidy chi can be good choices.     Reach and stay at your healthy weight. This will lower your risk for many health problems.   Take care of your mental health. Try to stay connected with friends, family, and community, and find ways to manage stress.     If you're feeling depressed or hopeless, talk to someone. A counselor can help. If you don't have a counselor, talk to your doctor.   Talk to your doctor if you think you may have a problem with alcohol or drug use. This includes prescription medicines and illegal drugs.     Avoid tobacco and nicotine: Don't smoke, vape, or chew. If you need help quitting, talk to your doctor.   Practice safer sex. Getting tested, using condoms or dental dams, and limiting sex partners can help prevent STIs.     Make an advance directive. This is a legal way to tell your family and doctor what you want to happen at the end of your life or when you can't speak for yourself.   Prevent problems where you can. Protect

## 2025-03-17 ENCOUNTER — HOSPITAL ENCOUNTER (OUTPATIENT)
Dept: WOMENS IMAGING | Age: 80
Discharge: HOME | End: 2025-03-17
Payer: MEDICARE

## 2025-03-17 VITALS — BODY MASS INDEX: 24.15 KG/M2 | WEIGHT: 123 LBS | HEIGHT: 60 IN

## 2025-03-17 DIAGNOSIS — M81.0 AGE-RELATED OSTEOPOROSIS WITHOUT CURRENT PATHOLOGICAL FRACTURE: ICD-10-CM

## 2025-03-17 DIAGNOSIS — Z12.31 SCREENING MAMMOGRAM, ENCOUNTER FOR: ICD-10-CM

## 2025-03-17 PROCEDURE — 77063 BREAST TOMOSYNTHESIS BI: CPT

## 2025-03-17 PROCEDURE — 77080 DXA BONE DENSITY AXIAL: CPT

## 2025-03-18 ENCOUNTER — RESULTS FOLLOW-UP (OUTPATIENT)
Dept: WOMENS IMAGING | Age: 80
End: 2025-03-18

## 2025-03-18 DIAGNOSIS — R92.8 ABNORMAL MAMMOGRAM OF RIGHT BREAST: Primary | ICD-10-CM

## 2025-03-18 NOTE — RESULT ENCOUNTER NOTE
Please CALL OR TEXT- GRANT'S BONE DENSITY INDICATES PERSISTENT L HIP OSTEOPOROSIS, SLIGHTLY WORSE FROM NEGATIVE 3 TO NEGATIVE 3.2  PLEASE CONTINUE FOSAMAX AND SHOULD BE ON CALCIUM AND VIT D SUPPLEMENT, LASTLY WT BEARING EXERCISE SUCH AS REGULAR WALKING CAN HELP PRESERVE BONE STRENGTH

## 2025-03-26 ENCOUNTER — TELEPHONE (OUTPATIENT)
Dept: INTERNAL MEDICINE CLINIC | Age: 80
End: 2025-03-26

## 2025-03-26 DIAGNOSIS — M25.559 HIP PAIN, UNSPECIFIED LATERALITY: Primary | ICD-10-CM

## 2025-04-02 ENCOUNTER — HOSPITAL ENCOUNTER (OUTPATIENT)
Dept: ULTRASOUND IMAGING | Age: 80
Discharge: HOME | End: 2025-04-02
Payer: MEDICARE

## 2025-04-02 ENCOUNTER — HOSPITAL ENCOUNTER (OUTPATIENT)
Dept: WOMENS IMAGING | Age: 80
Discharge: HOME | End: 2025-04-02
Payer: MEDICARE

## 2025-04-02 DIAGNOSIS — R92.8 ABNORMAL MAMMOGRAM OF RIGHT BREAST: ICD-10-CM

## 2025-04-02 PROCEDURE — G0279 TOMOSYNTHESIS, MAMMO: HCPCS

## 2025-04-02 PROCEDURE — 76642 ULTRASOUND BREAST LIMITED: CPT

## 2025-04-14 DIAGNOSIS — F41.9 ANXIETY: ICD-10-CM

## 2025-04-14 DIAGNOSIS — M51.360 DEGENERATION OF INTERVERTEBRAL DISC OF LUMBAR REGION WITH DISCOGENIC BACK PAIN: Primary | ICD-10-CM

## 2025-04-14 RX ORDER — ESCITALOPRAM OXALATE 10 MG/1
10 TABLET ORAL EVERY MORNING
Qty: 90 TABLET | Refills: 1 | Status: SHIPPED | OUTPATIENT
Start: 2025-04-14

## 2025-05-06 ENCOUNTER — OFFICE VISIT (OUTPATIENT)
Age: 80
End: 2025-05-06
Payer: MEDICARE

## 2025-05-06 VITALS
DIASTOLIC BLOOD PRESSURE: 70 MMHG | OXYGEN SATURATION: 98 % | SYSTOLIC BLOOD PRESSURE: 114 MMHG | BODY MASS INDEX: 24.26 KG/M2 | HEART RATE: 86 BPM | WEIGHT: 124.2 LBS

## 2025-05-06 DIAGNOSIS — E55.9 VITAMIN D DEFICIENCY: ICD-10-CM

## 2025-05-06 DIAGNOSIS — G60.9 IDIOPATHIC PERIPHERAL NEUROPATHY: Primary | ICD-10-CM

## 2025-05-06 PROCEDURE — 99203 OFFICE O/P NEW LOW 30 MIN: CPT | Performed by: STUDENT IN AN ORGANIZED HEALTH CARE EDUCATION/TRAINING PROGRAM

## 2025-05-06 PROCEDURE — 3074F SYST BP LT 130 MM HG: CPT | Performed by: STUDENT IN AN ORGANIZED HEALTH CARE EDUCATION/TRAINING PROGRAM

## 2025-05-06 PROCEDURE — 1090F PRES/ABSN URINE INCON ASSESS: CPT | Performed by: STUDENT IN AN ORGANIZED HEALTH CARE EDUCATION/TRAINING PROGRAM

## 2025-05-06 PROCEDURE — G8427 DOCREV CUR MEDS BY ELIG CLIN: HCPCS | Performed by: STUDENT IN AN ORGANIZED HEALTH CARE EDUCATION/TRAINING PROGRAM

## 2025-05-06 PROCEDURE — 1036F TOBACCO NON-USER: CPT | Performed by: STUDENT IN AN ORGANIZED HEALTH CARE EDUCATION/TRAINING PROGRAM

## 2025-05-06 PROCEDURE — G8399 PT W/DXA RESULTS DOCUMENT: HCPCS | Performed by: STUDENT IN AN ORGANIZED HEALTH CARE EDUCATION/TRAINING PROGRAM

## 2025-05-06 PROCEDURE — 99204 OFFICE O/P NEW MOD 45 MIN: CPT | Performed by: STUDENT IN AN ORGANIZED HEALTH CARE EDUCATION/TRAINING PROGRAM

## 2025-05-06 PROCEDURE — 1159F MED LIST DOCD IN RCRD: CPT | Performed by: STUDENT IN AN ORGANIZED HEALTH CARE EDUCATION/TRAINING PROGRAM

## 2025-05-06 PROCEDURE — 3078F DIAST BP <80 MM HG: CPT | Performed by: STUDENT IN AN ORGANIZED HEALTH CARE EDUCATION/TRAINING PROGRAM

## 2025-05-06 PROCEDURE — 1123F ACP DISCUSS/DSCN MKR DOCD: CPT | Performed by: STUDENT IN AN ORGANIZED HEALTH CARE EDUCATION/TRAINING PROGRAM

## 2025-05-06 PROCEDURE — G8420 CALC BMI NORM PARAMETERS: HCPCS | Performed by: STUDENT IN AN ORGANIZED HEALTH CARE EDUCATION/TRAINING PROGRAM

## 2025-05-06 NOTE — PROGRESS NOTES
Consult Note  [facility]  Patient Name: Saskia Shen  : 1945        Subjective:   Reason for consult:   Patient seen and examined. Chart reviewed in detail.    79 y.o. -female with PMH anxiety, B12 deficiency (improved for many years), vitamin D deficiency (still deficient 1 year ago), HTN, HLD, basal cell carcinoma of skin presenting to [facility] for parasthesia in her feet.    She tells me she first developed vague sensation in her feet like like there's something stuck to the balls of her feet 6-8 mo ago . This has not  worsened over time.  Gait and balance is reportedly not significantly  effected and there are very rare  falls reported only if she takes the steps too fast. The numbness in the feet is  not painful. It does not concern her or bother her in any way she says.   ?  She is currently prescribed gabapentin 100 mg twice daily to improve the paresthesia, but says she only takes it here and there.  She denies any notable side effects.  She is not sure if it improves the paresthesia at all.    SHe denies history of diabetes, alcoholism, chemotherapy, radiation.   ?  Denies family history of peripheral neuropathy.    Recent lab work: Normal B12 and A1c.  No recent vitamin D, folic acid, TSH, SPEP, UPEP           No data to display                 Past Medical History:   Diagnosis Date    Abnormal EKG 2016    Anxiety     B12 deficiency 2021    DDD (degenerative disc disease), lumbar     Diverticulitis     Family history of colon cancer     Family history of coronary artery disease     H/O cardiovascular stress test 2016    cardiolite-normal,EF70%    H/O cardiovascular stress test 2021    Normal Stress, EF 60%.    H/O echocardiogram 2021    Normal EF 55-60%. Mild Mitral and tricuspid regurgitation    Hiatal hernia with GERD     Hyperlipidemia     Hypertension     3/24- was lt headed on daily dose- change prn SBP >160, but if bp worsens off this

## 2025-05-08 ENCOUNTER — HOSPITAL ENCOUNTER (OUTPATIENT)
Age: 80
Discharge: HOME | End: 2025-05-08
Payer: MEDICARE

## 2025-05-08 DIAGNOSIS — G60.9 IDIOPATHIC PERIPHERAL NEUROPATHY: ICD-10-CM

## 2025-05-08 DIAGNOSIS — E55.9 VITAMIN D DEFICIENCY: ICD-10-CM

## 2025-05-08 LAB
25(OH)D3 SERPL-MCNC: 14.3 NG/ML (ref 30–150)
PROT SERPL-MCNC: 5.9 G/DL

## 2025-05-08 PROCEDURE — 82306 VITAMIN D 25 HYDROXY: CPT

## 2025-05-08 PROCEDURE — 82607 VITAMIN B-12: CPT

## 2025-05-08 PROCEDURE — 82746 ASSAY OF FOLIC ACID SERUM: CPT

## 2025-05-08 PROCEDURE — 84165 PROTEIN E-PHORESIS SERUM: CPT

## 2025-05-08 PROCEDURE — 84155 ASSAY OF PROTEIN SERUM: CPT

## 2025-05-09 ENCOUNTER — HOSPITAL ENCOUNTER (OUTPATIENT)
Age: 80
Setting detail: SPECIMEN
Discharge: HOME | End: 2025-05-09
Payer: MEDICARE

## 2025-05-09 LAB
FOLATE SERPL-MCNC: 22.9 NG/ML (ref 4.8–24.2)
MISCELLANEOUS LAB TEST RESULT: NORMAL
TEST NAME: NORMAL
VIT B12 SERPL-MCNC: 263 PG/ML (ref 211–911)

## 2025-05-09 PROCEDURE — 84166 PROTEIN E-PHORESIS/URINE/CSF: CPT

## 2025-05-09 PROCEDURE — 84156 ASSAY OF PROTEIN URINE: CPT

## 2025-05-12 LAB
MISCELLANEOUS LAB TEST RESULT: ABNORMAL
TEST NAME: ABNORMAL

## 2025-05-14 LAB
MISCELLANEOUS LAB TEST RESULT: NORMAL
TEST NAME: NORMAL

## 2025-05-15 ENCOUNTER — RESULTS FOLLOW-UP (OUTPATIENT)
Dept: NEPHROLOGY | Age: 80
End: 2025-05-15

## 2025-05-20 ENCOUNTER — RESULTS FOLLOW-UP (OUTPATIENT)
Age: 80
End: 2025-05-20

## 2025-06-18 DIAGNOSIS — G58.8 OTHER MONONEUROPATHY: ICD-10-CM

## 2025-06-18 RX ORDER — GABAPENTIN 100 MG/1
100 CAPSULE ORAL 2 TIMES DAILY
Qty: 180 CAPSULE | Refills: 3 | OUTPATIENT
Start: 2025-06-18

## 2025-07-01 NOTE — PROGRESS NOTES
Saskia Shen  1945 07/01/25    SUBJECTIVE:   Ur incont better back on detrol    Hypertension: Stable. Denies CP, SOB, cough, visual changes, palpitations or HA.    SHE CAN HAVE SOME LT HEADEDNESS GETTING UP IN AM.  BP AT HOME RUNS ~114-120.  SUGGESTED FOR 1-2 WEEKS TRYING HCTZ QOD TO SEE IF MAKES A DIFFERENCE.  IF BP REMAINS <130 AND LT HEADEDNESS RESOLVES THEN STAY ON QOD DOSING     Neck and lbp noted, we discussed trying tizanidine.      Periph neuropathy, did see [facility] and vit D defic noted, started supplement    Lipids:  Has history of high cholesterol, not on medication but trying to continue regular low fat diet and maintenance of weight, regular exercise.    HX VIT D AND B12 DEFIC, WE'LL F/U LEVELS.      OBJECTIVE:    /70   Pulse 89   Ht 1.524 m (5')   Wt 56 kg (123 lb 6.4 oz)   LMP  (LMP Unknown)   SpO2 98%   BMI 24.10 kg/m²     Physical Exam  Constitutional:       General: She is not in acute distress.     Appearance: She is well-developed. She is not diaphoretic.   HENT:      Head: Normocephalic and atraumatic.   Neck:      Thyroid: No thyromegaly.      Trachea: No tracheal deviation.   Cardiovascular:      Rate and Rhythm: Normal rate and regular rhythm.      Heart sounds: Normal heart sounds. No murmur heard.     No friction rub. No gallop.   Pulmonary:      Effort: No respiratory distress.      Breath sounds: Normal breath sounds. No wheezing or rales.   Abdominal:      General: Bowel sounds are normal. There is no distension.      Palpations: Abdomen is soft. There is no mass.      Tenderness: There is no abdominal tenderness.      Hernia: No hernia is present.   Musculoskeletal:      Right lower leg: No edema.      Left lower leg: No edema.   Lymphadenopathy:      Cervical: No cervical adenopathy.   Psychiatric:         Mood and Affect: Mood normal.         ASSESSMENT:    1. Neck pain    2. Anxiety    3. Subacute cough    4. Other mononeuropathy    5. Primary

## 2025-07-02 ENCOUNTER — OFFICE VISIT (OUTPATIENT)
Dept: INTERNAL MEDICINE CLINIC | Age: 80
End: 2025-07-02

## 2025-07-02 VITALS
DIASTOLIC BLOOD PRESSURE: 70 MMHG | WEIGHT: 123.4 LBS | HEIGHT: 60 IN | HEART RATE: 89 BPM | OXYGEN SATURATION: 98 % | BODY MASS INDEX: 24.23 KG/M2 | SYSTOLIC BLOOD PRESSURE: 114 MMHG

## 2025-07-02 DIAGNOSIS — M54.2 NECK PAIN: Primary | ICD-10-CM

## 2025-07-02 DIAGNOSIS — E55.9 VITAMIN D DEFICIENCY: ICD-10-CM

## 2025-07-02 DIAGNOSIS — E53.8 B12 DEFICIENCY: ICD-10-CM

## 2025-07-02 DIAGNOSIS — E78.2 MIXED HYPERLIPIDEMIA: ICD-10-CM

## 2025-07-02 DIAGNOSIS — R05.2 SUBACUTE COUGH: ICD-10-CM

## 2025-07-02 DIAGNOSIS — N39.41 URGE INCONTINENCE OF URINE: ICD-10-CM

## 2025-07-02 DIAGNOSIS — F41.9 ANXIETY: ICD-10-CM

## 2025-07-02 DIAGNOSIS — G58.8 OTHER MONONEUROPATHY: ICD-10-CM

## 2025-07-02 DIAGNOSIS — I10 PRIMARY HYPERTENSION: ICD-10-CM

## 2025-07-02 DIAGNOSIS — F51.01 PRIMARY INSOMNIA: ICD-10-CM

## 2025-07-02 RX ORDER — TRAZODONE HYDROCHLORIDE 50 MG/1
50 TABLET ORAL NIGHTLY PRN
Qty: 90 TABLET | Refills: 1 | Status: SHIPPED | OUTPATIENT
Start: 2025-07-02

## 2025-07-02 RX ORDER — GABAPENTIN 100 MG/1
100 CAPSULE ORAL 2 TIMES DAILY
Qty: 180 CAPSULE | Refills: 1 | Status: SHIPPED | OUTPATIENT
Start: 2025-07-02 | End: 2025-12-29

## 2025-07-02 RX ORDER — TOLTERODINE TARTRATE 1 MG/1
1 TABLET, EXTENDED RELEASE ORAL 2 TIMES DAILY
Qty: 180 TABLET | Refills: 1 | Status: SHIPPED | OUTPATIENT
Start: 2025-07-02

## 2025-07-02 RX ORDER — HYDROCHLOROTHIAZIDE 12.5 MG/1
12.5 CAPSULE ORAL EVERY MORNING
Qty: 90 CAPSULE | Refills: 3 | Status: SHIPPED | OUTPATIENT
Start: 2025-07-02

## 2025-07-02 RX ORDER — TIZANIDINE 2 MG/1
2 TABLET ORAL NIGHTLY PRN
Qty: 90 TABLET | Refills: 1 | Status: SHIPPED | OUTPATIENT
Start: 2025-07-02

## 2025-07-02 RX ORDER — FLUTICASONE PROPIONATE 50 MCG
2 SPRAY, SUSPENSION (ML) NASAL DAILY
Qty: 16 G | Refills: 5 | Status: SHIPPED | OUTPATIENT
Start: 2025-07-02

## 2025-07-02 RX ORDER — MULTIVITAMIN WITH IRON
500 TABLET ORAL DAILY
COMMUNITY
Start: 2025-07-02 | End: 2026-07-02

## 2025-07-02 RX ORDER — ESCITALOPRAM OXALATE 10 MG/1
10 TABLET ORAL EVERY MORNING
Qty: 90 TABLET | Refills: 1 | Status: SHIPPED | OUTPATIENT
Start: 2025-07-02

## 2025-07-02 RX ORDER — CHOLECALCIFEROL (VITAMIN D3) 25 MCG
1000 TABLET ORAL DAILY
Qty: 90 TABLET | Refills: 3 | Status: SHIPPED | OUTPATIENT
Start: 2025-07-02

## 2025-07-02 NOTE — PATIENT INSTRUCTIONS
VERY UNLIKELY CLINICALLY FOR DEMENTIA BUT WE'LL CHECK TIKA TEST AND REC  ALSO CONSIDER NEW LANGUAGE OR MUSICAL INSTRUMENT.

## 2025-07-07 ENCOUNTER — HOSPITAL ENCOUNTER (OUTPATIENT)
Age: 80
Discharge: HOME | End: 2025-07-07
Payer: MEDICARE

## 2025-07-07 ENCOUNTER — RESULTS FOLLOW-UP (OUTPATIENT)
Dept: INTERNAL MEDICINE CLINIC | Age: 80
End: 2025-07-07

## 2025-07-07 DIAGNOSIS — E78.2 MIXED HYPERLIPIDEMIA: ICD-10-CM

## 2025-07-07 DIAGNOSIS — E53.8 B12 DEFICIENCY: ICD-10-CM

## 2025-07-07 DIAGNOSIS — E55.9 VITAMIN D DEFICIENCY: ICD-10-CM

## 2025-07-07 DIAGNOSIS — I10 PRIMARY HYPERTENSION: ICD-10-CM

## 2025-07-07 LAB
25(OH)D3 SERPL-MCNC: 21.2 NG/ML (ref 30–150)
ALBUMIN SERPL-MCNC: 3.9 G/DL (ref 3.4–5)
ALBUMIN/GLOB SERPL: 1.8 {RATIO} (ref 1.1–2.2)
ALP SERPL-CCNC: 92 U/L (ref 40–129)
ALT SERPL-CCNC: 9 U/L (ref 10–40)
ANION GAP SERPL CALCULATED.3IONS-SCNC: 11 MMOL/L (ref 9–17)
AST SERPL-CCNC: 16 U/L (ref 15–37)
BASOPHILS # BLD: 0.04 K/UL
BASOPHILS NFR BLD: 1 % (ref 0–1)
BILIRUB SERPL-MCNC: 0.5 MG/DL (ref 0–1)
BUN SERPL-MCNC: 23 MG/DL (ref 7–20)
CALCIUM SERPL-MCNC: 9.1 MG/DL (ref 8.3–10.6)
CHLORIDE SERPL-SCNC: 104 MMOL/L (ref 99–110)
CHOLEST SERPL-MCNC: 193 MG/DL (ref 125–199)
CO2 SERPL-SCNC: 24 MMOL/L (ref 21–32)
CREAT SERPL-MCNC: 1 MG/DL (ref 0.6–1.2)
EOSINOPHIL # BLD: 0.17 K/UL
EOSINOPHILS RELATIVE PERCENT: 4 % (ref 0–3)
ERYTHROCYTE [DISTWIDTH] IN BLOOD BY AUTOMATED COUNT: 12.1 % (ref 11.7–14.9)
GFR, ESTIMATED: 53 ML/MIN/1.73M2
GLUCOSE SERPL-MCNC: 93 MG/DL (ref 74–99)
HCT VFR BLD AUTO: 35 % (ref 37–47)
HDLC SERPL-MCNC: 66 MG/DL
HGB BLD-MCNC: 11.7 G/DL (ref 12.5–16)
IMM GRANULOCYTES # BLD AUTO: 0.02 K/UL
IMM GRANULOCYTES NFR BLD: 1 %
LDLC SERPL CALC-MCNC: 108 MG/DL
LYMPHOCYTES NFR BLD: 0.75 K/UL
LYMPHOCYTES RELATIVE PERCENT: 19 % (ref 24–44)
MCH RBC QN AUTO: 31.9 PG (ref 27–31)
MCHC RBC AUTO-ENTMCNC: 33.4 G/DL (ref 32–36)
MCV RBC AUTO: 95.4 FL (ref 78–100)
MONOCYTES NFR BLD: 0.5 K/UL
MONOCYTES NFR BLD: 13 % (ref 0–5)
NEUTROPHILS NFR BLD: 63 % (ref 36–66)
NEUTS SEG NFR BLD: 2.51 K/UL
PLATELET # BLD AUTO: 196 K/UL (ref 140–440)
PMV BLD AUTO: 10.4 FL (ref 7.5–11.1)
POTASSIUM SERPL-SCNC: 4.3 MMOL/L (ref 3.5–5.1)
PROT SERPL-MCNC: 6.1 G/DL (ref 6.4–8.2)
RBC # BLD AUTO: 3.67 M/UL (ref 4.2–5.4)
SODIUM SERPL-SCNC: 139 MMOL/L (ref 136–145)
TRIGL SERPL-MCNC: 96 MG/DL
TSH SERPL DL<=0.05 MIU/L-ACNC: 1.83 UIU/ML (ref 0.27–4.2)
VIT B12 SERPL-MCNC: 314 PG/ML (ref 211–911)
WBC OTHER # BLD: 4 K/UL (ref 4–10.5)

## 2025-07-07 PROCEDURE — 80061 LIPID PANEL: CPT

## 2025-07-07 PROCEDURE — 82607 VITAMIN B-12: CPT

## 2025-07-07 PROCEDURE — 85025 COMPLETE CBC W/AUTO DIFF WBC: CPT

## 2025-07-07 PROCEDURE — 80053 COMPREHEN METABOLIC PANEL: CPT

## 2025-07-07 PROCEDURE — 82306 VITAMIN D 25 HYDROXY: CPT

## 2025-07-07 PROCEDURE — 84443 ASSAY THYROID STIM HORMONE: CPT

## (undated) DEVICE — Z DISCONTINUED (USE MFG CAT MVABO)  TUBING GAS SAMPLING STD 6.5 FT FEMALE CONN SMRT CAPNOLINE

## (undated) DEVICE — BRUSH CLN DIA5MM NYL SGL END CBL ASST FLEX DSTL TIP POLYPR

## (undated) DEVICE — TUBING, SUCTION, 3/16" X 6', STRAIGHT: Brand: MEDLINE

## (undated) DEVICE — TUBING, SUCTION, 9/32" X 10', STRAIGHT: Brand: MEDLINE

## (undated) DEVICE — LINER SUCT CANSTR 1500CC SEMI RIG W/ POR HYDROPHOBIC SHUT

## (undated) DEVICE — YANKAUER,FLEXIBLE HANDLE,REGLR CAPACITY: Brand: MEDLINE INDUSTRIES, INC.

## (undated) DEVICE — JELLY LUBRICATING 3 GM BACTERIOSTATIC